# Patient Record
Sex: MALE | Race: WHITE | NOT HISPANIC OR LATINO | Employment: OTHER | URBAN - METROPOLITAN AREA
[De-identification: names, ages, dates, MRNs, and addresses within clinical notes are randomized per-mention and may not be internally consistent; named-entity substitution may affect disease eponyms.]

---

## 2017-03-21 ENCOUNTER — LAB REQUISITION (OUTPATIENT)
Dept: LAB | Facility: HOSPITAL | Age: 72
End: 2017-03-21
Payer: MEDICARE

## 2017-03-21 DIAGNOSIS — R31.29 OTHER MICROSCOPIC HEMATURIA: ICD-10-CM

## 2017-03-21 PROCEDURE — 88112 CYTOPATH CELL ENHANCE TECH: CPT | Performed by: UROLOGY

## 2017-09-18 ENCOUNTER — LAB REQUISITION (OUTPATIENT)
Dept: LAB | Facility: HOSPITAL | Age: 72
End: 2017-09-18
Payer: MEDICARE

## 2017-09-18 DIAGNOSIS — R31.21 ASYMPTOMATIC MICROSCOPIC HEMATURIA: ICD-10-CM

## 2017-09-18 PROCEDURE — 88112 CYTOPATH CELL ENHANCE TECH: CPT | Performed by: UROLOGY

## 2020-06-26 ENCOUNTER — CLINICAL SUPPORT (OUTPATIENT)
Dept: FAMILY MEDICINE CLINIC | Facility: CLINIC | Age: 75
End: 2020-06-26
Payer: MEDICARE

## 2020-06-26 DIAGNOSIS — E53.8 VITAMIN B 12 DEFICIENCY: Primary | ICD-10-CM

## 2020-06-26 RX ORDER — CYANOCOBALAMIN 1000 UG/ML
1000 INJECTION INTRAMUSCULAR; SUBCUTANEOUS
Status: DISCONTINUED | OUTPATIENT
Start: 2020-06-26 | End: 2021-11-10 | Stop reason: HOSPADM

## 2020-06-26 RX ADMIN — CYANOCOBALAMIN 1000 MCG: 1000 INJECTION INTRAMUSCULAR; SUBCUTANEOUS at 16:34

## 2020-07-08 DIAGNOSIS — R41.3 MEMORY IMPAIRMENT: Primary | ICD-10-CM

## 2020-07-08 RX ORDER — DONEPEZIL HYDROCHLORIDE 10 MG/1
10 TABLET, FILM COATED ORAL DAILY
COMMUNITY
End: 2020-07-10 | Stop reason: SDUPTHER

## 2020-07-08 RX ORDER — DONEPEZIL HYDROCHLORIDE 10 MG/1
10 TABLET, FILM COATED ORAL DAILY
Qty: 90 TABLET | Refills: 1 | OUTPATIENT
Start: 2020-07-08

## 2020-07-08 NOTE — TELEPHONE ENCOUNTER
Patient wife called patient has been out of his Donepezil for a few days now she states she spoke to someone regarding the refill when she had him in 2 weeks ago   Please send to [facility] on [address]  90 day supply takes 1 daily

## 2020-07-09 NOTE — TELEPHONE ENCOUNTER
Patient does have neurologist but neurologist was a Dr Nahomi Mae who moved to [place] and the neurologist they are scheduled with is not able to see them until November

## 2020-07-10 RX ORDER — DONEPEZIL HYDROCHLORIDE 10 MG/1
10 TABLET, FILM COATED ORAL DAILY
Qty: 90 TABLET | Refills: 0 | Status: SHIPPED | OUTPATIENT
Start: 2020-07-10 | End: 2020-07-22 | Stop reason: SDUPTHER

## 2020-07-22 ENCOUNTER — OFFICE VISIT (OUTPATIENT)
Dept: FAMILY MEDICINE CLINIC | Facility: CLINIC | Age: 75
End: 2020-07-22
Payer: MEDICARE

## 2020-07-22 VITALS
DIASTOLIC BLOOD PRESSURE: 64 MMHG | BODY MASS INDEX: 22.89 KG/M2 | HEIGHT: 72 IN | TEMPERATURE: 97.8 F | HEART RATE: 55 BPM | WEIGHT: 169 LBS | SYSTOLIC BLOOD PRESSURE: 112 MMHG | OXYGEN SATURATION: 100 % | RESPIRATION RATE: 16 BRPM

## 2020-07-22 DIAGNOSIS — I10 ESSENTIAL HYPERTENSION: Primary | ICD-10-CM

## 2020-07-22 DIAGNOSIS — N40.0 BENIGN PROSTATIC HYPERPLASIA WITHOUT LOWER URINARY TRACT SYMPTOMS: ICD-10-CM

## 2020-07-22 DIAGNOSIS — R73.01 ELEVATED FASTING GLUCOSE: ICD-10-CM

## 2020-07-22 DIAGNOSIS — G30.8 ALZHEIMER'S DISEASE OF OTHER ONSET WITHOUT BEHAVIORAL DISTURBANCE: ICD-10-CM

## 2020-07-22 DIAGNOSIS — F02.80 ALZHEIMER'S DISEASE OF OTHER ONSET WITHOUT BEHAVIORAL DISTURBANCE: ICD-10-CM

## 2020-07-22 DIAGNOSIS — R41.3 MEMORY IMPAIRMENT: ICD-10-CM

## 2020-07-22 DIAGNOSIS — E78.00 HYPERCHOLESTEROLEMIA: ICD-10-CM

## 2020-07-22 PROBLEM — G30.9 ALZHEIMER'S DISEASE (HCC): Status: ACTIVE | Noted: 2020-07-22

## 2020-07-22 PROCEDURE — 99213 OFFICE O/P EST LOW 20 MIN: CPT | Performed by: NURSE PRACTITIONER

## 2020-07-22 PROCEDURE — 1160F RVW MEDS BY RX/DR IN RCRD: CPT | Performed by: NURSE PRACTITIONER

## 2020-07-22 PROCEDURE — 1036F TOBACCO NON-USER: CPT | Performed by: NURSE PRACTITIONER

## 2020-07-22 PROCEDURE — 4040F PNEUMOC VAC/ADMIN/RCVD: CPT | Performed by: NURSE PRACTITIONER

## 2020-07-22 RX ORDER — ASCORBIC ACID 500 MG
500 TABLET ORAL DAILY
Qty: 90 TABLET | Refills: 1 | Status: SHIPPED | OUTPATIENT
Start: 2020-07-22 | End: 2021-03-23 | Stop reason: SDUPTHER

## 2020-07-22 RX ORDER — DONEPEZIL HYDROCHLORIDE 10 MG/1
10 TABLET, FILM COATED ORAL DAILY
Qty: 90 TABLET | Refills: 1 | Status: SHIPPED | OUTPATIENT
Start: 2020-07-22 | End: 2021-03-28

## 2020-07-22 RX ORDER — SODIUM FLUORIDE 6 MG/ML
PASTE, DENTIFRICE DENTAL
COMMUNITY
Start: 2020-07-02 | End: 2021-12-02 | Stop reason: HOSPADM

## 2020-07-22 RX ORDER — ASCORBIC ACID 500 MG
500 TABLET ORAL DAILY
COMMUNITY
End: 2020-07-22 | Stop reason: SDUPTHER

## 2020-07-22 RX ORDER — TAMSULOSIN HYDROCHLORIDE 0.4 MG/1
0.4 CAPSULE ORAL DAILY
Qty: 90 CAPSULE | Refills: 1 | Status: SHIPPED | OUTPATIENT
Start: 2020-07-22 | End: 2021-03-23 | Stop reason: SDUPTHER

## 2020-07-22 RX ORDER — SIMVASTATIN 20 MG
20 TABLET ORAL
Qty: 90 TABLET | Refills: 1 | Status: SHIPPED | OUTPATIENT
Start: 2020-07-22 | End: 2021-03-23 | Stop reason: SDUPTHER

## 2020-07-22 RX ORDER — TAMSULOSIN HYDROCHLORIDE 0.4 MG/1
0.4 CAPSULE ORAL DAILY
COMMUNITY
Start: 2020-04-27 | End: 2020-07-22 | Stop reason: SDUPTHER

## 2020-07-22 RX ORDER — AMLODIPINE, VALSARTAN AND HYDROCHLOROTHIAZIDE 5; 160; 12.5 MG/1; MG/1; MG/1
1 TABLET ORAL EVERY MORNING
Qty: 90 TABLET | Refills: 1 | Status: SHIPPED | OUTPATIENT
Start: 2020-07-22 | End: 2020-07-28 | Stop reason: ALTCHOICE

## 2020-07-22 RX ORDER — ASPIRIN 81 MG/1
81 TABLET, CHEWABLE ORAL DAILY
Qty: 90 TABLET | Refills: 1 | Status: SHIPPED | OUTPATIENT
Start: 2020-07-22 | End: 2021-03-23 | Stop reason: SDUPTHER

## 2020-07-22 RX ORDER — RIBOFLAVIN (VITAMIN B2) 100 MG
100 TABLET ORAL DAILY
COMMUNITY
End: 2020-07-22 | Stop reason: SDUPTHER

## 2020-07-22 RX ADMIN — CYANOCOBALAMIN 1000 MCG: 1000 INJECTION INTRAMUSCULAR; SUBCUTANEOUS at 10:41

## 2020-07-22 NOTE — PROGRESS NOTES
Assessment/Plan:    Presents in office for 4 month follow up   Used to see Dr Edi Oconnell   HTN - stable   Decreased cognitive function / dementia under care of Neurology   BPH under care of Urology without any issues   hyperlipidemia stable  He will need follow up labs   Will review labs  Over the phone            Problem List Items Addressed This Visit        Cardiovascular and Mediastinum    Essential hypertension - Primary    Relevant Medications    amLODIPine-Valsartan-HCTZ 5-160-12 5 MG TABS    Other Relevant Orders    Comprehensive metabolic panel    TSH, 3rd generation with Free T4 reflex    Lipid panel    UA (URINE) with reflex to Scope    HEMOGLOBIN A1C W/ EAG ESTIMATION    CBC and differential       Nervous and Auditory    Alzheimer's disease (HCC)    Relevant Medications    donepezil (ARICEPT) 10 mg tablet    Other Relevant Orders    Comprehensive metabolic panel    TSH, 3rd generation with Free T4 reflex       Genitourinary    Benign prostatic hyperplasia without lower urinary tract symptoms    Relevant Medications    tamsulosin (FLOMAX) 0 4 mg       Other    Memory impairment    Relevant Medications    ascorbic acid (VITAMIN C) 500 MG tablet    aspirin 81 mg chewable tablet    donepezil (ARICEPT) 10 mg tablet    Other Relevant Orders    Comprehensive metabolic panel    TSH, 3rd generation with Free T4 reflex    Hypercholesterolemia    Relevant Medications    simvastatin (ZOCOR) 20 mg tablet    Other Relevant Orders    Comprehensive metabolic panel    Lipid panel    Elevated fasting glucose    Relevant Orders    Comprehensive metabolic panel    HEMOGLOBIN A1C W/ EAG ESTIMATION    CBC and differential            Subjective:      Patient ID: Ishmael Foster is a 67 y o  male      Presents in office for 4 month follow up   Used to see Dr Edi Oconnell   HTN - stable   Decreased cognitive function / dementia under care of Neurology   BPH under care of Urology   hypercholesteremia - stable         The following portions of the patient's history were reviewed and updated as appropriate:   He has a past medical history of Dementia ([address] ) and Hyperlipidemia ,  does not have any pertinent problems on file  ,   has a past surgical history that includes Replacement total knee (Right, 06/19/2019) and Colonoscopy (11/19/2019)  ,  family history includes Lung disease in his father  ,   reports that he has quit smoking  He has a 2 50 pack-year smoking history  He has never used smokeless tobacco  He reports that he drank alcohol  He reports that he has current or past drug history  ,  has No Known Allergies     Current Outpatient Medications   Medication Sig Dispense Refill    ascorbic acid (VITAMIN C) 500 MG tablet Take 1 tablet (500 mg total) by mouth daily 90 tablet 1    aspirin 81 mg chewable tablet Chew 1 tablet (81 mg total) daily 90 tablet 1    donepezil (ARICEPT) 10 mg tablet Take 1 tablet (10 mg total) by mouth daily 90 tablet 1    PREVIDENT 5000 BOOSTER PLUS 1 1 % PSTE Use as directed      simvastatin (ZOCOR) 20 mg tablet Take 1 tablet (20 mg total) by mouth daily at bedtime 90 tablet 1    tamsulosin (FLOMAX) 0 4 mg Take 1 capsule (0 4 mg total) by mouth daily 90 capsule 1    amLODIPine-Valsartan-HCTZ 5-160-12 5 MG TABS Take 1 tablet by mouth every morning 90 tablet 1     Current Facility-Administered Medications   Medication Dose Route Frequency Provider Last Rate Last Dose    cyanocobalamin injection 1,000 mcg  1,000 mcg Intramuscular Q30 Days HORACIO Navarro   1,000 mcg at 07/22/20 1041       Review of Systems   Constitutional: Positive for fatigue  Negative for chills and fever  HENT: Negative for congestion, rhinorrhea, sinus pressure and sinus pain  Eyes: Negative  Respiratory: Negative for cough and shortness of breath  Cardiovascular: Negative for chest pain, palpitations and leg swelling  Gastrointestinal: Negative for abdominal distention and abdominal pain  Endocrine: Negative  Genitourinary: Negative for difficulty urinating and flank pain  Musculoskeletal: Positive for myalgias  Allergic/Immunologic: Positive for environmental allergies  Neurological: Negative for dizziness and headaches  Memory issues /dementia    Psychiatric/Behavioral: Positive for confusion and decreased concentration  Objective:  Vitals:    07/22/20 1002   BP: 112/64   BP Location: Right arm   Patient Position: Sitting   Cuff Size: Standard   Pulse: 55   Resp: 16   Temp: 97 8 °F (36 6 °C)   TempSrc: Temporal   SpO2: 100%   Weight: 76 7 kg (169 lb)   Height: 6' (1 829 m)     Body mass index is 22 92 kg/m²  Physical Exam   Constitutional: He appears well-developed and well-nourished  BMI 22   HENT:   Head: Normocephalic  Eyes: EOM are normal    Neck: Normal range of motion  Cardiovascular: Normal rate and regular rhythm  Pulmonary/Chest: Effort normal and breath sounds normal    Abdominal: Soft  Bowel sounds are normal    Musculoskeletal: Normal range of motion  Neurological: He is alert  Skin: Skin is warm and dry  Nursing note and vitals reviewed

## 2020-07-26 NOTE — PATIENT INSTRUCTIONS

## 2020-07-27 LAB — HBA1C MFR BLD HPLC: 5.6 %

## 2020-07-28 ENCOUNTER — TELEPHONE (OUTPATIENT)
Dept: FAMILY MEDICINE CLINIC | Facility: CLINIC | Age: 75
End: 2020-07-28

## 2020-08-12 ENCOUNTER — TRANSCRIBE ORDERS (OUTPATIENT)
Dept: ADMINISTRATIVE | Facility: HOSPITAL | Age: 75
End: 2020-08-12

## 2020-08-12 DIAGNOSIS — Z96.659 MECHANICAL LOOSENING OF PROSTHETIC KNEE, INITIAL ENCOUNTER (HCC): ICD-10-CM

## 2020-08-12 DIAGNOSIS — Z96.651 PRESENCE OF RIGHT ARTIFICIAL KNEE JOINT: Primary | ICD-10-CM

## 2020-08-12 DIAGNOSIS — T84.038A MECHANICAL LOOSENING OF PROSTHETIC KNEE, INITIAL ENCOUNTER (HCC): ICD-10-CM

## 2020-08-17 ENCOUNTER — HOSPITAL ENCOUNTER (OUTPATIENT)
Dept: RADIOLOGY | Facility: HOSPITAL | Age: 75
Discharge: HOME | End: 2020-08-17
Payer: MEDICARE

## 2020-08-17 DIAGNOSIS — Z96.659 MECHANICAL LOOSENING OF PROSTHETIC KNEE, INITIAL ENCOUNTER (HCC): ICD-10-CM

## 2020-08-17 DIAGNOSIS — T84.038A MECHANICAL LOOSENING OF PROSTHETIC KNEE, INITIAL ENCOUNTER (HCC): ICD-10-CM

## 2020-08-17 DIAGNOSIS — Z96.651 PRESENCE OF RIGHT ARTIFICIAL KNEE JOINT: ICD-10-CM

## 2020-08-17 PROCEDURE — A9503 TC99M MEDRONATE: HCPCS

## 2020-08-17 PROCEDURE — G1004 CDSM NDSC: HCPCS

## 2020-08-17 PROCEDURE — 78315 BONE IMAGING 3 PHASE: CPT

## 2020-08-28 ENCOUNTER — CLINICAL SUPPORT (OUTPATIENT)
Dept: FAMILY MEDICINE CLINIC | Facility: CLINIC | Age: 75
End: 2020-08-28
Payer: MEDICARE

## 2020-08-28 DIAGNOSIS — E53.9 VITAMIN B DEFICIENCY: ICD-10-CM

## 2020-08-28 PROCEDURE — 96372 THER/PROPH/DIAG INJ SC/IM: CPT

## 2020-08-28 RX ADMIN — CYANOCOBALAMIN 1000 MCG: 1000 INJECTION INTRAMUSCULAR; SUBCUTANEOUS at 09:43

## 2020-09-29 ENCOUNTER — CLINICAL SUPPORT (OUTPATIENT)
Dept: FAMILY MEDICINE CLINIC | Facility: CLINIC | Age: 75
End: 2020-09-29

## 2020-09-29 DIAGNOSIS — E53.8 VITAMIN B12 DEFICIENCY: Primary | ICD-10-CM

## 2020-10-30 ENCOUNTER — CLINICAL SUPPORT (OUTPATIENT)
Dept: FAMILY MEDICINE CLINIC | Facility: CLINIC | Age: 75
End: 2020-10-30
Payer: MEDICARE

## 2020-10-30 DIAGNOSIS — E53.8 CYANOCOBALAMIN DEFICIENCY: ICD-10-CM

## 2020-10-30 PROCEDURE — 96372 THER/PROPH/DIAG INJ SC/IM: CPT | Performed by: NURSE PRACTITIONER

## 2020-10-30 RX ADMIN — CYANOCOBALAMIN 1000 MCG: 1000 INJECTION INTRAMUSCULAR; SUBCUTANEOUS at 11:33

## 2020-11-10 ENCOUNTER — TELEPHONE (OUTPATIENT)
Dept: FAMILY MEDICINE CLINIC | Facility: CLINIC | Age: 75
End: 2020-11-10

## 2020-11-10 DIAGNOSIS — E78.2 MIXED HYPERLIPIDEMIA: ICD-10-CM

## 2020-11-10 DIAGNOSIS — Z12.5 SCREENING FOR PROSTATE CANCER: ICD-10-CM

## 2020-11-10 DIAGNOSIS — R73.01 ELEVATED FASTING GLUCOSE: ICD-10-CM

## 2020-11-10 DIAGNOSIS — I10 ESSENTIAL HYPERTENSION: Primary | ICD-10-CM

## 2020-11-16 LAB — HBA1C MFR BLD HPLC: 5.7 %

## 2020-11-20 ENCOUNTER — OFFICE VISIT (OUTPATIENT)
Dept: FAMILY MEDICINE CLINIC | Facility: CLINIC | Age: 75
End: 2020-11-20
Payer: MEDICARE

## 2020-11-20 VITALS
WEIGHT: 176 LBS | RESPIRATION RATE: 16 BRPM | DIASTOLIC BLOOD PRESSURE: 70 MMHG | HEIGHT: 72 IN | TEMPERATURE: 97.2 F | OXYGEN SATURATION: 90 % | BODY MASS INDEX: 23.84 KG/M2 | SYSTOLIC BLOOD PRESSURE: 120 MMHG | HEART RATE: 88 BPM

## 2020-11-20 DIAGNOSIS — E78.2 MIXED HYPERLIPIDEMIA: Primary | ICD-10-CM

## 2020-11-20 DIAGNOSIS — G30.8 ALZHEIMER'S DISEASE OF OTHER ONSET WITHOUT BEHAVIORAL DISTURBANCE: ICD-10-CM

## 2020-11-20 DIAGNOSIS — I10 ESSENTIAL HYPERTENSION: ICD-10-CM

## 2020-11-20 DIAGNOSIS — F02.80 ALZHEIMER'S DISEASE OF OTHER ONSET WITHOUT BEHAVIORAL DISTURBANCE: ICD-10-CM

## 2020-11-20 DIAGNOSIS — R73.01 ELEVATED FASTING GLUCOSE: ICD-10-CM

## 2020-11-20 PROCEDURE — 99214 OFFICE O/P EST MOD 30 MIN: CPT

## 2020-11-20 PROCEDURE — G0439 PPPS, SUBSEQ VISIT: HCPCS

## 2020-11-20 RX ORDER — FERROUS SULFATE 325(65) MG
TABLET ORAL
COMMUNITY
End: 2021-11-10 | Stop reason: HOSPADM

## 2020-11-20 RX ORDER — IPRATROPIUM BROMIDE 21 UG/1
SPRAY, METERED NASAL
COMMUNITY
End: 2021-11-10 | Stop reason: HOSPADM

## 2020-11-20 RX ORDER — INFLUENZA A VIRUS A/MICHIGAN/45/2015 X-275 (H1N1) ANTIGEN (FORMALDEHYDE INACTIVATED), INFLUENZA A VIRUS A/SINGAPORE/INFIMH-16-0019/2016 IVR-186 (H3N2) ANTIGEN (FORMALDEHYDE INACTIVATED), INFLUENZA B VIRUS B/PHUKET/3073/2013 ANTIGEN (FORMALDEHYDE INACTIVATED), AND INFLUENZA B VIRUS B/MARYLAND/15/2016 BX-69A ANTIGEN (FORMALDEHYDE INACTIVATED) 60; 60; 60; 60 UG/.7ML; UG/.7ML; UG/.7ML; UG/.7ML
INJECTION, SUSPENSION INTRAMUSCULAR
COMMUNITY
End: 2021-11-10 | Stop reason: HOSPADM

## 2020-11-20 RX ORDER — AMLODIPINE, VALSARTAN AND HYDROCHLOROTHIAZIDE 5; 160; 12.5 MG/1; MG/1; MG/1
TABLET ORAL
COMMUNITY
End: 2021-03-23 | Stop reason: SDUPTHER

## 2020-11-20 RX ORDER — IBUPROFEN 800 MG/1
TABLET ORAL
COMMUNITY
Start: 2020-08-11 | End: 2021-03-23 | Stop reason: ALTCHOICE

## 2020-11-20 RX ADMIN — CYANOCOBALAMIN 1000 MCG: 1000 INJECTION INTRAMUSCULAR; SUBCUTANEOUS at 14:48

## 2020-12-03 ENCOUNTER — TELEPHONE (OUTPATIENT)
Dept: FAMILY MEDICINE CLINIC | Facility: CLINIC | Age: 75
End: 2020-12-03

## 2020-12-03 DIAGNOSIS — F02.81 ALZHEIMER'S DEMENTIA WITH BEHAVIORAL DISTURBANCE, UNSPECIFIED TIMING OF DEMENTIA ONSET (HCC): Primary | ICD-10-CM

## 2020-12-03 DIAGNOSIS — G30.9 ALZHEIMER'S DEMENTIA WITH BEHAVIORAL DISTURBANCE, UNSPECIFIED TIMING OF DEMENTIA ONSET (HCC): Primary | ICD-10-CM

## 2020-12-09 ENCOUNTER — TELEPHONE (OUTPATIENT)
Dept: NEUROLOGY | Facility: CLINIC | Age: 75
End: 2020-12-09

## 2020-12-14 ENCOUNTER — TELEPHONE (OUTPATIENT)
Dept: FAMILY MEDICINE CLINIC | Facility: CLINIC | Age: 75
End: 2020-12-14

## 2020-12-28 ENCOUNTER — CONSULT (OUTPATIENT)
Dept: NEUROLOGY | Facility: CLINIC | Age: 75
End: 2020-12-28
Payer: MEDICARE

## 2020-12-28 VITALS
SYSTOLIC BLOOD PRESSURE: 130 MMHG | WEIGHT: 170 LBS | TEMPERATURE: 96.6 F | DIASTOLIC BLOOD PRESSURE: 74 MMHG | BODY MASS INDEX: 23.03 KG/M2 | HEART RATE: 64 BPM | HEIGHT: 72 IN

## 2020-12-28 DIAGNOSIS — F03.90 ADVANCED DEMENTIA (HCC): Primary | ICD-10-CM

## 2020-12-28 DIAGNOSIS — F02.81 ALZHEIMER'S DISEASE OF OTHER ONSET WITH BEHAVIORAL DISTURBANCE: ICD-10-CM

## 2020-12-28 DIAGNOSIS — G30.8 ALZHEIMER'S DISEASE OF OTHER ONSET WITH BEHAVIORAL DISTURBANCE: ICD-10-CM

## 2020-12-28 DIAGNOSIS — R41.3 MEMORY LOSS: ICD-10-CM

## 2020-12-28 PROCEDURE — 99205 OFFICE O/P NEW HI 60 MIN: CPT | Performed by: PSYCHIATRY & NEUROLOGY

## 2020-12-28 RX ORDER — MEMANTINE HYDROCHLORIDE 5 MG/1
TABLET ORAL
Qty: 60 TABLET | Refills: 3 | Status: SHIPPED | OUTPATIENT
Start: 2020-12-28 | End: 2021-03-29

## 2020-12-30 ENCOUNTER — CLINICAL SUPPORT (OUTPATIENT)
Dept: FAMILY MEDICINE CLINIC | Facility: CLINIC | Age: 75
End: 2020-12-30
Payer: MEDICARE

## 2020-12-30 VITALS — TEMPERATURE: 96.6 F

## 2020-12-30 DIAGNOSIS — E53.8 B12 DEFICIENCY: ICD-10-CM

## 2020-12-30 DIAGNOSIS — E53.8 VITAMIN B12 DEFICIENCY: Primary | ICD-10-CM

## 2020-12-30 PROCEDURE — 96372 THER/PROPH/DIAG INJ SC/IM: CPT

## 2020-12-30 RX ORDER — CYANOCOBALAMIN 1000 UG/ML
1000 INJECTION INTRAMUSCULAR; SUBCUTANEOUS
Status: DISCONTINUED | OUTPATIENT
Start: 2020-12-30 | End: 2021-11-10 | Stop reason: HOSPADM

## 2020-12-30 RX ADMIN — CYANOCOBALAMIN 1000 MCG: 1000 INJECTION INTRAMUSCULAR; SUBCUTANEOUS at 13:45

## 2021-01-07 ENCOUNTER — TELEPHONE (OUTPATIENT)
Dept: NEUROLOGY | Facility: CLINIC | Age: 76
End: 2021-01-07

## 2021-01-07 NOTE — TELEPHONE ENCOUNTER
Patient calling in  She knows that MRI and EEG were ordered for patient but she states she does not think that patient will be able to have them completed at the current time because she cannot go into the appt with him and she does not think that he could sit /lie still for a long period of time  Wondering if Dr Quan Romo is okay with this?     [phone]   Ok to leave a detailed message

## 2021-01-07 NOTE — TELEPHONE ENCOUNTER
Called and reviewed aretha caputo, she will await for visitor policy to change so that she can be with him

## 2021-01-18 ENCOUNTER — TELEPHONE (OUTPATIENT)
Dept: NEUROLOGY | Facility: CLINIC | Age: 76
End: 2021-01-18

## 2021-01-18 NOTE — TELEPHONE ENCOUNTER
Saul Ulloa calling in  She states patient is taking Memantine 5mg tab, 1 tab daily  Since starting this medication, patient has been more anxious and does not sleep through the night  She notes he is more confused than normal as well  He stays up all night talking out loud  She notices his symptoms can happen throughout the day but are definitely worse at night  Any recommendations?     [phone]   Ok to leave a detailed message

## 2021-01-29 ENCOUNTER — CLINICAL SUPPORT (OUTPATIENT)
Dept: FAMILY MEDICINE CLINIC | Facility: CLINIC | Age: 76
End: 2021-01-29
Payer: MEDICARE

## 2021-01-29 DIAGNOSIS — E53.8 VITAMIN B 12 DEFICIENCY: Primary | ICD-10-CM

## 2021-01-29 PROCEDURE — 96372 THER/PROPH/DIAG INJ SC/IM: CPT

## 2021-01-29 RX ADMIN — CYANOCOBALAMIN 1000 MCG: 1000 INJECTION INTRAMUSCULAR; SUBCUTANEOUS at 13:36

## 2021-02-11 DIAGNOSIS — Z23 ENCOUNTER FOR IMMUNIZATION: ICD-10-CM

## 2021-02-23 ENCOUNTER — IMMUNIZATIONS (OUTPATIENT)
Dept: FAMILY MEDICINE CLINIC | Facility: HOSPITAL | Age: 76
End: 2021-02-23

## 2021-02-23 DIAGNOSIS — Z23 ENCOUNTER FOR IMMUNIZATION: Primary | ICD-10-CM

## 2021-02-23 PROCEDURE — 0001A SARS-COV-2 / COVID-19 MRNA VACCINE (PFIZER-BIONTECH) 30 MCG: CPT

## 2021-02-23 PROCEDURE — 91300 SARS-COV-2 / COVID-19 MRNA VACCINE (PFIZER-BIONTECH) 30 MCG: CPT

## 2021-03-15 LAB — HBA1C MFR BLD HPLC: 5.5 %

## 2021-03-18 ENCOUNTER — IMMUNIZATIONS (OUTPATIENT)
Dept: FAMILY MEDICINE CLINIC | Facility: HOSPITAL | Age: 76
End: 2021-03-18

## 2021-03-18 DIAGNOSIS — Z23 ENCOUNTER FOR IMMUNIZATION: Primary | ICD-10-CM

## 2021-03-18 PROCEDURE — 91300 SARS-COV-2 / COVID-19 MRNA VACCINE (PFIZER-BIONTECH) 30 MCG: CPT

## 2021-03-18 PROCEDURE — 0002A SARS-COV-2 / COVID-19 MRNA VACCINE (PFIZER-BIONTECH) 30 MCG: CPT

## 2021-03-23 ENCOUNTER — OFFICE VISIT (OUTPATIENT)
Dept: FAMILY MEDICINE CLINIC | Facility: CLINIC | Age: 76
End: 2021-03-23
Payer: MEDICARE

## 2021-03-23 VITALS
HEIGHT: 72 IN | DIASTOLIC BLOOD PRESSURE: 68 MMHG | RESPIRATION RATE: 17 BRPM | HEART RATE: 62 BPM | SYSTOLIC BLOOD PRESSURE: 128 MMHG | WEIGHT: 181.6 LBS | BODY MASS INDEX: 24.6 KG/M2 | OXYGEN SATURATION: 98 % | TEMPERATURE: 97.3 F

## 2021-03-23 DIAGNOSIS — G30.8 ALZHEIMER'S DISEASE OF OTHER ONSET WITH BEHAVIORAL DISTURBANCE: ICD-10-CM

## 2021-03-23 DIAGNOSIS — F02.81 ALZHEIMER'S DISEASE OF OTHER ONSET WITH BEHAVIORAL DISTURBANCE: ICD-10-CM

## 2021-03-23 DIAGNOSIS — R41.3 MEMORY IMPAIRMENT: ICD-10-CM

## 2021-03-23 DIAGNOSIS — Z78.9 NEED FOR FOLLOW-UP BY SOCIAL WORKER: Primary | ICD-10-CM

## 2021-03-23 DIAGNOSIS — N40.0 BENIGN PROSTATIC HYPERPLASIA WITHOUT LOWER URINARY TRACT SYMPTOMS: ICD-10-CM

## 2021-03-23 DIAGNOSIS — E78.00 HYPERCHOLESTEROLEMIA: ICD-10-CM

## 2021-03-23 DIAGNOSIS — R73.01 ELEVATED FASTING GLUCOSE: ICD-10-CM

## 2021-03-23 DIAGNOSIS — I10 ESSENTIAL HYPERTENSION: Primary | ICD-10-CM

## 2021-03-23 PROCEDURE — 96372 THER/PROPH/DIAG INJ SC/IM: CPT

## 2021-03-23 PROCEDURE — 99214 OFFICE O/P EST MOD 30 MIN: CPT

## 2021-03-23 RX ORDER — TAMSULOSIN HYDROCHLORIDE 0.4 MG/1
0.4 CAPSULE ORAL DAILY
Qty: 90 CAPSULE | Refills: 1 | Status: SHIPPED | OUTPATIENT
Start: 2021-03-23 | End: 2021-09-27

## 2021-03-23 RX ORDER — AMLODIPINE, VALSARTAN AND HYDROCHLOROTHIAZIDE 5; 160; 12.5 MG/1; MG/1; MG/1
1 TABLET ORAL DAILY
Qty: 90 TABLET | Refills: 1 | Status: SHIPPED | OUTPATIENT
Start: 2021-03-23 | End: 2021-11-10 | Stop reason: HOSPADM

## 2021-03-23 RX ORDER — ASPIRIN 81 MG/1
81 TABLET, CHEWABLE ORAL DAILY
Qty: 90 TABLET | Refills: 1 | Status: SHIPPED | OUTPATIENT
Start: 2021-03-23 | End: 2021-12-11 | Stop reason: SDUPTHER

## 2021-03-23 RX ORDER — ASCORBIC ACID 500 MG
500 TABLET ORAL DAILY
Qty: 90 TABLET | Refills: 1 | Status: SHIPPED | OUTPATIENT
Start: 2021-03-23 | End: 2021-12-11

## 2021-03-23 RX ORDER — SIMVASTATIN 20 MG
20 TABLET ORAL
Qty: 90 TABLET | Refills: 1 | Status: SHIPPED | OUTPATIENT
Start: 2021-03-23 | End: 2021-03-29 | Stop reason: SDUPTHER

## 2021-03-23 RX ADMIN — CYANOCOBALAMIN 1000 MCG: 1000 INJECTION INTRAMUSCULAR; SUBCUTANEOUS at 13:41

## 2021-03-23 NOTE — PROGRESS NOTES
Assessment/Plan:    HTN - stable   Decreased cognitive function / dementia under care of Neurology - would like to see [facility] Neurology- his neurology moved   BPH under care of Urology   hypercholesteremia - stable   Denies any issues today - behavioral issues in the office patient is restless and pacing around   Wife states he  Only likes to be home   Wife needs respite care  So she can take care of her medical issues   I will reach out to  Complex care management for assistance   See if any services available to them      Problem List Items Addressed This Visit        Cardiovascular and Mediastinum    Essential hypertension - Primary    Relevant Medications    amLODIPine-Valsartan-HCTZ 5-160-12 5 MG TABS    Other Relevant Orders    Ambulatory referral to complex care management program    Comprehensive metabolic panel    CBC and differential    TSH, 3rd generation    Lipid panel       Nervous and Auditory    Alzheimer's disease ([facility] [address] )    Relevant Orders    Ambulatory referral to complex care management program    Comprehensive metabolic panel    CBC and differential    TSH, 3rd generation    Lipid panel       Genitourinary    Benign prostatic hyperplasia without lower urinary tract symptoms    Relevant Medications    tamsulosin (FLOMAX) 0 4 mg    Other Relevant Orders    Ambulatory referral to complex care management program    Comprehensive metabolic panel    CBC and differential    TSH, 3rd generation    Lipid panel       Other    Memory impairment    Relevant Medications    aspirin 81 mg chewable tablet    ascorbic acid (VITAMIN C) 500 MG tablet    Other Relevant Orders    Ambulatory referral to complex care management program    Hypercholesterolemia    Relevant Orders    Ambulatory referral to complex care management program    Comprehensive metabolic panel    CBC and differential    TSH, 3rd generation    Lipid panel    Elevated fasting glucose    Relevant Orders    Ambulatory referral to complex care management program    Comprehensive metabolic panel    CBC and differential    TSH, 3rd generation    Lipid panel            Subjective:      Patient ID: Jorden Persaud is a 67 y o  male  Presents in office for 4 month follow up   HTN - stable   Decreased cognitive function / dementia under care of Neurology - would like to see [facility] [place] [facility]  BPH under care of Urology   hypercholesteremia - stable   Denies any issues today           The following portions of the patient's history were reviewed and updated as appropriate:   Past Medical History:  He has a past medical history of Dementia ([address] ), Hyperlipidemia, and Memory loss  ,  _______________________________________________________________________  Medical Problems:  does not have any pertinent problems on file ,  _______________________________________________________________________  Past Surgical History:   has a past surgical history that includes Replacement total knee (Right, 06/19/2019) and Colonoscopy (11/19/2019)  ,  _______________________________________________________________________  Family History:  family history includes Lung disease in his father ,  _______________________________________________________________________  Social History:   reports that he has quit smoking  He has a 2 50 pack-year smoking history  He has never used smokeless tobacco  He reports previous alcohol use  He reports previous drug use ,  _______________________________________________________________________  Allergies:  has No Known Allergies     _______________________________________________________________________  Current Outpatient Medications   Medication Sig Dispense Refill    amLODIPine-Valsartan-HCTZ 5-160-12 5 MG TABS Take 1 tablet by mouth daily 90 tablet 1    ascorbic acid (VITAMIN C) 500 MG tablet Take 1 tablet (500 mg total) by mouth daily 90 tablet 1    aspirin 81 mg chewable tablet Chew 1 tablet (81 mg total) daily 90 tablet 1    ferrous sulfate 325 (65 Fe) mg tablet Take 1 tablet by mouth twice a day with meals      influenza vaccine, high-dose (Fluzone High-Dose Quadrivalent) 0 7 ML ANAI Fluzone High-Dose Quad 2020-21 (PF) 240 mcg/0 7 mL IM syringe   TO BE ADMINISTERED BY PHARMACIST FOR IMMUNIZATION      ipratropium (ATROVENT) 0 03 % nasal spray ipratropium bromide 0 03 % nasal spray   2 SQUIRTS EACH NOSTRIL TWICE DAILY      PREVIDENT 5000 BOOSTER PLUS 1 1 % PSTE Use as directed      tamsulosin (FLOMAX) 0 4 mg Take 1 capsule (0 4 mg total) by mouth daily 90 capsule 1    Aspirin-Calcium Carbonate  MG TABS Take 1 tablet daily      donepezil (ARICEPT) 10 mg tablet TAKE 1 TABLET BY MOUTH EVERY DAY 90 tablet 1    memantine (NAMENDA) 5 mg tablet START WITH 1 TAB DAILY FOR 1 MONTH, AND THEN 1 TABLET TWICE DAILY  180 tablet 1    simvastatin (ZOCOR) 20 mg tablet Take 1 tablet (20 mg total) by mouth daily at bedtime 90 tablet 1     Current Facility-Administered Medications   Medication Dose Route Frequency Provider Last Rate Last Admin    cyanocobalamin injection 1,000 mcg  1,000 mcg Intramuscular Q30 Days Marvetta Cools, CRNP   1,000 mcg at 03/23/21 1341    cyanocobalamin injection 1,000 mcg  1,000 mcg Intramuscular Q30 Days Marvetta Cools, CRNP   1,000 mcg at 12/30/20 1345     _______________________________________________________________________  Review of Systems   Constitutional: Positive for fatigue  Negative for chills and fever  Dementia    HENT: Negative for congestion, rhinorrhea, sinus pressure and sinus pain  Eyes: Negative  Respiratory: Negative for cough and shortness of breath  Cardiovascular: Negative for chest pain, palpitations and leg swelling  Gastrointestinal: Negative for abdominal distention and abdominal pain  Endocrine: Negative  Genitourinary: Negative for difficulty urinating and flank pain  Musculoskeletal: Positive for myalgias  Allergic/Immunologic: Positive for environmental allergies  Neurological: Negative for dizziness and headaches  Memory issues /dementia    Psychiatric/Behavioral: Positive for confusion and decreased concentration  Objective:  Vitals:    03/23/21 1329   BP: 128/68   BP Location: Left arm   Patient Position: Sitting   Cuff Size: Standard   Pulse: 62   Resp: 17   Temp: (!) 97 3 °F (36 3 °C)   TempSrc: Temporal   SpO2: 98%   Weight: 82 4 kg (181 lb 9 6 oz)   Height: 6' (1 829 m)     Body mass index is 24 63 kg/m²  Physical Exam  Vitals signs and nursing note reviewed  Constitutional:       Appearance: He is well-developed  Comments: BMI 23 87    HENT:      Head: Normocephalic  Neck:      Musculoskeletal: Normal range of motion  Pulmonary:      Effort: Pulmonary effort is normal       Comments: Unable to listen to lungs in office restless and pacing around   Upset   Abdominal:      General: Bowel sounds are normal       Palpations: Abdomen is soft  Musculoskeletal: Normal range of motion  Skin:     General: Skin is warm and dry  Neurological:      Mental Status: He is alert  He is disoriented  Gait: Gait abnormal       Comments: Dementia        Contains abnormal data CBC AND DIFFERENTIAL  Order: [id number]  (suggestion)  Information displayed in this report will not trend or trigger automated decision support      Ref Range & Units 3/15/21 10:11 AM   Hemoglobin 12 5 - 17 0 g/dL 11 8Low     Hematocrit 37 0 - 48 0 % 35 9Low     WBC 4 0 - 10 5 thou/cmm 7 2    RBC 4 00 - 5 40 mill/cmm 4 37    Platelet Count 278 - 350 thou/cmm 272    MPV 7 5 - 11 3 fL 8 5    MCV 80 - 100 fL 82    MCH 27 0 - 36 0 pg 26 9Low     MCHC 32 0 - 37 0 g/dL 32 8    RDW 12 0 - 16 0 % 15 5    Differential Type   AUTO    Absolute Neutrophils 1 8 - 7 8 thou/cmm 4 7    Absolute Lymphocytes 1 0 - 3 0 thou/cmm 1 9    Absolute Monocytes 0 3 - 1 0 thou/cmm 0 4    Absolute Eosinophils 0 0 - 0 5 thou/cmm 0 2    Absolute Basophils 0 0 - 0 1 thou/cmm 0 0    Neutrophils  % 65 Lymphocytes  % 26    Monocytes  % 5    Eosinophils  % 3    Basophils  % 1    Specimen Collected: 03/15/21 10:11 AM Last Resulted: 03/15/21  5:21 PM   Received From: [address] [place] [address]  Result Received: 03/18/21  1:17 PM    Received Information     URINALYSIS WITH MICROSCOPIC  Order: [id number]  (suggestion)  Information displayed in this report will not trend or trigger automated decision support  Ref Range & Units 3/15/21 10:11 AM   Specific Gravity, Urine 1 003 - 1 030  1 025    pH, Urine 4 5 - 8 0  6    Protein, Urine Negative mg/dL Negative    Glucose, Urine Negative mg/dL Negative    Ketone, Urine Negative mg/dL Negative    Blood, Urine Negative mg/dL Negative    Leukocytes Esterase Negative /uL Negative    Nitrite, Urine Negative  Negative    Comment   SEE NOTES    Comment: Microscopic analysis not performed on urine with negative contributing biochemical results for Protein, Blood, Leukocyte Esterase, or Nitrates  Specimen Collected: 03/15/21 10:11 AM Last Resulted: 03/15/21  5:34 PM   Received From: [address] [place] [address]  Result Received: 03/18/21  1:17 PM     TSH, 3RD GENERATION  Order: [id number]  (suggestion)  Information displayed in this report will not trend or trigger automated decision support      Ref Range & Units 3/15/21 10:11 AM   Thyroid Stimulating Hormone 0 36 - 3 74 uIU/mL 1 90    Specimen Collected: 03/15/21 10:11 AM Last Resulted: 03/15/21  6:13 PM   Received From: [address] [place] [address]  Result Received: 03/18/21  1:17 PM     HEMOGLOBIN A1C  Order: [id number]  Status:  Final result   Next appt:  04/28/2021 at 02:00 PM in Neurology Curtis Arias MD)   Ref Range & Units 3/15/21 10:11 AM   Hemoglobin A1C <5 7 % 5 5    Comment: Reference Range   Non-diabetic                     <5 7   Pre-diabetic                     5 7-6 4   Diabetic                         >=6 5   ADA target for diabetic control  <=7   eAG, EST AVG Glucose <154 mg/dL 111          LIPID PANEL  Order: [id number]  (suggestion)  Information displayed in this report will not trend or trigger automated decision support  Ref Range & Units 3/15/21 10:11 AM   Cholesterol <200 mg/dL 128    Triglyceride <150 mg/dL 72    Cholesterol, HDL, Direct >40 mg/dL 51    Cholesterol, Non-HDL <160 mg/dL 77    Comment: Note: For NCEP interpretive guidelines please refer to the Laboratory Handbook  Cholesterol, LDL, Calculated <130 mg/dL 63    Comment: LDL Cholesterol was calculated using the Friedewald equation  Direct measurement of LDL is not indicated for this patient based on [facility]'s analytical algorithm for measurement of LDL Cholesterol  CHOL/HDL Ratio   2 51    Comment: Relative Risk   1/2 Average Risk          3 43   Average Risk              4 97   2X Average Risk           9 55   3X Average Risk          23 39   Specimen Collected: 03/15/21 10:11 AM Last Resulted: 03/15/21  6:13 PM   Received From: [address]  Result Received: 03/18/21  1:17 PM     Contains abnormal data COMPREHENSIVE METABOLIC PANEL  Order: [id number]  (suggestion)  Information displayed in this report will not trend or trigger automated decision support  Ref Range & Units 3/15/21 10:11 AM   Glucose 65 - 99 mg/dL 79    BUN 7 - 28 mg/dL 17    Creatinine 0 53 - 1 30 mg/dL 0 70    Sodium 135 - 145 mmol/L 141    Potassium 3 5 - 5 2 mmol/L 4 0    Chloride 100 - 109 mmol/L 111High     Carbon Dioxide 23 - 31 mmol/L 23    Calcium 8 5 - 10 1 mg/dL 9 2    Alkaline Phosphatase 35 - 120 U/L 74    Albumin 3 5 - 4 8 g/dL 3 4Low     Bilirubin, Total 0 2 - 1 0 mg/dL 0 5    Comment: Use of this assay is not recommended for patients undergoing treatment with eltrombopag due to the potential for falsely elevated results     Protein, Total 6 3 - 8 3 g/dL 7 0    AST <41 U/L 14    ALT <56 U/L 16    Anion Gap 3 - 11  7    eGFR, Non- >60  92    eGFR,  >60  107    eGFR Comment   Units: mL/min per 1 73 square meters

## 2021-03-23 NOTE — PATIENT INSTRUCTIONS
Medication Safety for Older Adults   WHAT YOU NEED TO KNOW:   Medication safety includes taking the right medicine, at the right time, and at the right dose  Medicines can affect older adults differently than they affect younger adults or children  Make sure anyone who helps you knows the safety information and specific instructions for all your medicines  DISCHARGE INSTRUCTIONS:   Call your local emergency number (911 in the [address] [place] [address],[address]) or have someone else call if:   · You have trouble breathing or you stop breathing  · You are unconscious or someone cannot get you to respond  · You have a seizure  Return to the emergency department if:   · You feel excitable and have a rapid heartbeat  · You have new or sudden wheezing  · You have mouth or throat swelling  · Your eyes are sunken, or your eyes and mouth are dry  Call your doctor if:   · You missed a dose of your medicine, or you took too much  · You have a rash, or your face or lips look swollen  · You have a headache or are dizzy  · You are confused or irritable  · You are vomiting  · You have diarrhea with blood in it  · You have questions or concerns about your condition or care  Common medicine challenges for older adults:   · Several medicines are taken within the same time period  This can cause any of the following challenges:     ? The medicines may need to be taken at different times of the day  Some may be taken with food, and others without food  ? Several medicines look alike  This can cause confusion if you are not sure which medicine you need to take at a certain time  You may accidentally take the wrong medicine, or forget to take a medicine  ? Certain medicines can increase or decrease the effects of other medicines  Some medicines can cause side effects as they interact  · Certain medicines are less safe for older adults    You may plan to take a medicine you have taken for years, not realizing it is less safe as you get older  An example is NSAIDs for pain and inflammation, such as ibuprofen or naproxen  NSAIDs can increase the risk for stomach bleeding  This problem can be worse for older adults  · Medicine labels can be hard to read  The label may have small print  The information may be confusing or hard to understand  · Care is received from several providers  You may have 1 primary care doctor but also receive care from specialists  Each healthcare provider may prescribe or recommend different medicines  · Medicines are needed for a short time after a surgery or procedure  The new medicines may interact with medicines you take every day  · Medicine side effects are not clear  Side effects can be overlooked because they are similar to effects from a medical condition you have  For example, you may often feel dizzy from high blood pressure and not recognize dizziness from a new medicine  General medicine safety guidelines:   · Take your medicine as directed  Do not split or crush pills unless directed  If you miss a dose, ask how to make up the missed dose  Do not try to make your medicine last longer by skipping doses  Do not take medicines for longer than needed  For example, an opioid prescribed for pain after surgery may not be needed after a few days  · Check the label each time before you take a medicine  Turn on a light to make sure you are taking the right medicine  Ask your healthcare provider or a pharmacist to explain anything on the label you do not understand  Use glasses or a magnifying glass if it is hard to read the label  Your pharmacy may also be able to give you large print labels  · Take the medicine at the right time  Be sure you understand how often you should or can take the medicine  Keep track of when you need the medicine and when you took it  You may want to use a reminder on your phone, or a timer   Make sure every person who helps you can access the tracking and timing information  · Keep medicines organized  ? Store medicines in the bottles they came in  Do not remove the labels  ? Do not dump all your pills into a bag or box  Many pills look alike, and it is important to keep them separate until used  You can move the pills from their bottles into a pill box to organize your daily medicines for the week  ? Do not remove pills from blister packs until you are ready to take them  ? Do not pour liquid medicines into jars or other containers  · Measure liquid medicine correctly  Use a tool specially made to measure liquid medicine  Examples are oral syringes and marked dosing spoons or cups  These tools can be found at a drugstore  Do not  use a kitchen teaspoon or tablespoon  They are not accurate, so you may get too much or too little of the medicine  · Store medicine properly  You may need to store medicine in a cool, dark, dry place  You may need to refrigerate it  Proper storage will prevent the medicine from breaking down or going bad before the expiration date  · Do not drink alcohol or use drugs  Alcohol and drugs can interact with your medicines  Your risk for falls is higher if you mix alcohol or drugs with certain medicines  Alcohol with prescription pain medicines can make you sleepy and slow your breathing rate  You may stop breathing completely  · Do not drive until you know how your medicines affect you  Some medicines may cause you to be drowsy or dizzy  · Never share medicines  Do not take any medicines prescribed for another person  Do not give any of your medicines to another person  What you need to know about prescription pain medicine safety:   · Do not suddenly stop taking prescription pain medicine  If you have been taking prescription pain medicine for longer than 2 weeks, a sudden stop may cause dangerous side effects   Ask your healthcare provider for more information before you stop taking your medicine  · Take your medicine as directed  Take only the amount prescribed or recommended by your healthcare provider  Too much medicine may cause breathing problems or other health issues  If you use a pain patch, be sure to remove the old patch before you place a new one  · Time your medicine correctly  Take your pain medicine 30 minutes before exercise or physical therapy  This helps decrease pain to help meet your treatment goals  You may need to take medicine before you go to bed  This may help you sleep and not be woken by pain  · Watch for side effects  Some foods, alcohol, and other medicines may cause side effects when you take pain medicine  Ask your healthcare provider how to prevent these problems  · Prevent constipation  Constipation is the most common side effect of prescription pain medicine  Eat foods high in fiber, such as raw fruit, vegetables, beans, and whole-grain bread and cereal  Ask your healthcare provider how much liquid to drink each day and which liquids are best for you  Exercise and activity may also help decrease the risk for constipation  · Follow instructions for what to do with medicine you do not use  Your healthcare provider will give you instructions for how to dispose of pain medicine safely  This helps make sure no one else takes the medicine  Make sure others have information about your medicines:   · Keep a list of your medicines  Give the list to anyone who helps care for you, and to all your healthcare providers  Include the medicine amounts, and when and why you take them  Include any vitamins, herbs, or supplements you take  Ask your doctor or pharmacist if a medicine could interact with any other medicine or with food  Do not start or stop any medicine unless your healthcare provider tells you it is okay  Update your list if you start or stop any medicine  · Use 1 pharmacy for all your prescriptions    Your pharmacy will keep a list of your medicines  When you get a new prescription, the pharmacist will check that it will not interact with your other medicines  Keep a copy of the list  Your pharmacist can tell you the side effects for each medicine, and how medicines taken together may affect you  If you take too much medicine or have an allergic reaction:  Call the [facility] immediately at [phone]  For more information:   · [address] [facility] , [facility]  Phone: [phone]  Web Address: [employer]     Follow up with your doctor as directed:  Write down your questions so you remember to ask them during your visits  © Copyright [address] Information is for End User's use only and may not be sold, redistributed or otherwise used for commercial purposes  All illustrations and images included in CareNotes® are the copyrighted property of A D A M [employer] Inc  or [employer]   The above information is an  only  It is not intended as medical advice for individual conditions or treatments  Talk to your doctor, nurse or pharmacist before following any medical regimen to see if it is safe and effective for you  Heart Healthy Diet   WHAT YOU NEED TO KNOW:   A heart healthy diet is an eating plan low in unhealthy fats and sodium (salt)  The plan is high in healthy fats and fiber  A heart healthy diet helps improve your cholesterol levels and lowers your risk for heart disease and stroke  A dietitian will teach you how to read and understand food labels  DISCHARGE INSTRUCTIONS:   Heart healthy diet guidelines to follow:   · Choose foods that contain healthy fats  ? Unsaturated fats  include monounsaturated and polyunsaturated fats  Unsaturated fat is found in foods such as soybean, canola, olive, corn, and safflower oils  It is also found in soft tub margarine that is made with liquid vegetable oil      ? Omega-3 fat  is found in certain fish, such as salmon, tuna, and trout, and in walnuts and flaxseed  Eat fish high in omega-3 fats at least 2 times a week  · Get 20 to 30 grams of fiber each day  Fruits, vegetables, whole-grain foods, and legumes (cooked beans) are good sources of fiber  · Limit or do not have unhealthy fats  ? Cholesterol  is found in animal foods, such as eggs and lobster, and in dairy products made from whole milk  Limit cholesterol to less than 200 mg each day  ? Saturated fat  is found in meats, such as kim and hamburger  It is also found in chicken or turkey skin, whole milk, and butter  Limit saturated fat to less than 7% of your total daily calories  ? Trans fat  is found in packaged foods, such as potato chips and cookies  It is also in hard margarine, some fried foods, and shortening  Do not eat foods that contain trans fats  · Limit sodium as directed  You may be told to limit sodium to 2,000 to 2,300 mg each day  Choose low-sodium or no-salt-added foods  Add little or no salt to food you prepare  Use herbs and spices in place of salt  Include the following in your heart healthy plan:  Ask your dietitian or healthcare provider how many servings to have from each of the following food groups:  · Grains:      ? Whole-wheat breads, cereals, and pastas, and brown rice    ? Low-fat, low-sodium crackers and chips    · Vegetables:      ? Broccoli, green beans, green peas, and spinach    ? Collards, kale, and lima beans    ? Carrots, sweet potatoes, tomatoes, and peppers    ? Canned vegetables with no salt added    · Fruits:      ? Bananas, peaches, pears, and pineapple    ? Grapes, raisins, and dates    ? Oranges, tangerines, grapefruit, orange juice, and grapefruit juice    ? Apricots, mangoes, melons, and papaya    ? Raspberries and strawberries    ? Canned fruit with no added sugar    · Low-fat dairy:      ?  Nonfat (skim) milk, 1% milk, and low-fat almond, cashew, or soy milks fortified with calcium    ? Low-fat cheese, regular or frozen yogurt, and cottage cheese    · Meats and proteins:      ? Lean cuts of beef and pork (loin, leg, round), skinless chicken and turkey    ? Legumes, soy products, egg whites, or nuts    Limit or do not include the following in your heart healthy plan:   · Unhealthy fats and oils:      ? Whole or 2% milk, cream cheese, sour cream, or cheese    ? High-fat cuts of beef (T-bone steaks, ribs), chicken or turkey with skin, and organ meats such as liver    ? Butter, stick margarine, shortening, and cooking oils such as coconut or palm oil    · Foods and liquids high in sodium:      ? Packaged foods, such as frozen dinners, cookies, macaroni and cheese, and cereals with more than 300 mg of sodium per serving    ? Vegetables with added sodium, such as instant potatoes, vegetables with added sauces, or regular canned vegetables    ? Cured or smoked meats, such as hot dogs, kim, and sausage    ? High-sodium ketchup, barbecue sauce, salad dressing, pickles, olives, soy sauce, or miso    · Foods and liquids high in sugar:      ? Candy, cake, cookies, pies, or doughnuts    ? Soft drinks (soda), sports drinks, or sweetened tea    ? Canned or dry mixes for cakes, soups, sauces, or gravies    Other healthy heart guidelines:   · Do not smoke  Nicotine and other chemicals in cigarettes and cigars can cause lung and heart damage  Ask your healthcare provider for information if you currently smoke and need help to quit  E-cigarettes or smokeless tobacco still contain nicotine  Talk to your healthcare provider before you use these products  · Limit or do not drink alcohol as directed  Alcohol can damage your heart and raise your blood pressure  Your healthcare provider may give you specific daily and weekly limits  The general recommended limit is 1 drink a day for women 21 or older and for men 72 or older  Do not have more than 3 drinks in a day or 7 in a week   The recommended limit is 2 drinks a day for men 24to 59years of age  Do not have more than 4 drinks in a day or 14 in a week  A drink of alcohol is 12 ounces of beer, 5 ounces of wine, or 1½ ounces of liquor  · Exercise regularly  Exercise can help you maintain a healthy weight and improve your blood pressure and cholesterol levels  Regular exercise can also decrease your risk for heart problems  Ask your healthcare provider about the best exercise plan for you  Do not start an exercise program without asking your healthcare provider  Follow up with your doctor or cardiologist as directed:  Write down your questions so you remember to ask them during your visits  © Copyright [facility] [address] Information is for End User's use only and may not be sold, redistributed or otherwise used for commercial purposes  All illustrations and images included in CareNotes® are the copyrighted property of [employer]  or [facility] [employer]   The above information is an  only  It is not intended as medical advice for individual conditions or treatments  Talk to your doctor, nurse or pharmacist before following any medical regimen to see if it is safe and effective for you

## 2021-03-23 NOTE — Clinical Note
Put in referral   Anyway we can reach out and discuss any type of respite care for wife please , she is really needs some assistance so she can take care of her won medical issues - pt is starting to have behavioral issues when out of the house - let me know what I can do ? ?

## 2021-03-25 ENCOUNTER — PATIENT OUTREACH (OUTPATIENT)
Dept: CASE MANAGEMENT | Facility: OTHER | Age: 76
End: 2021-03-25

## 2021-03-25 NOTE — PROGRESS NOTES
OP CM SW received referral directly from PCP office regarding patient's wife needing more in home support  KEREN made outreach and spoke to patient's wife harshal  She states that Petty Hector is doing well, but she really feels like she could have more assistance in the home  Currently she does get some support from her daughter and sometimes her daughter in law  Mel Hernandez states she currently does all meal prep, and ADL's for [employer]  She will drive herself to the grocery store early In the morning, while jean carlos is still sleeping to get the food that they need  She is not interested in MOW's at this time  Discussed in home support options, and respite  Mel Hernandez let me know she doesn't really want [employer] to go anywhere because that will make him more confused  Mel Hernandez was not interested in facility respite at this time  KEREN discussed the [facility] [employer] waiver, however she does not feel financially that they will qualify, because they do have money   Another community resource that was discussed with Vanda Townsend was paying privately for caregivers to come into the home to [facility]  States she feels this would be the best option especially because if it was for a couple of hours during the day she could go out and get a little bit of a relief  KEREN will mail Rossana Fountain a list of Private Duty in home caregivers, along with information for share care  Additionally, KEREN will send Rossana Fountain a list of caregiver support groups  Rossana Fountain was extremely appreciative for my outreach and whatever community resources I could provide to her and her   KEREN will continue to follow and be available as needed

## 2021-03-27 DIAGNOSIS — F02.81 ALZHEIMER'S DISEASE OF OTHER ONSET WITH BEHAVIORAL DISTURBANCE: ICD-10-CM

## 2021-03-27 DIAGNOSIS — F03.90 ADVANCED DEMENTIA (HCC): ICD-10-CM

## 2021-03-27 DIAGNOSIS — G30.8 ALZHEIMER'S DISEASE OF OTHER ONSET WITH BEHAVIORAL DISTURBANCE: ICD-10-CM

## 2021-03-28 DIAGNOSIS — R41.3 MEMORY IMPAIRMENT: ICD-10-CM

## 2021-03-28 RX ORDER — DONEPEZIL HYDROCHLORIDE 10 MG/1
TABLET, FILM COATED ORAL
Qty: 90 TABLET | Refills: 1 | Status: SHIPPED | OUTPATIENT
Start: 2021-03-28 | End: 2021-07-07 | Stop reason: SDUPTHER

## 2021-03-29 DIAGNOSIS — E78.00 HYPERCHOLESTEROLEMIA: ICD-10-CM

## 2021-03-29 RX ORDER — SIMVASTATIN 20 MG
20 TABLET ORAL
Qty: 90 TABLET | Refills: 1 | Status: SHIPPED | OUTPATIENT
Start: 2021-03-29 | End: 2021-12-11 | Stop reason: SDUPTHER

## 2021-03-29 RX ORDER — MEMANTINE HYDROCHLORIDE 5 MG/1
TABLET ORAL
Qty: 180 TABLET | Refills: 1 | Status: SHIPPED | OUTPATIENT
Start: 2021-03-29 | End: 2021-11-10 | Stop reason: HOSPADM

## 2021-03-29 NOTE — TELEPHONE ENCOUNTER
Pt's wife left a message for a refill on simvastatin and donepezil - I see donepezil was already filled   Will T up simvastatin

## 2021-04-12 ENCOUNTER — PATIENT OUTREACH (OUTPATIENT)
Dept: CASE MANAGEMENT | Facility: OTHER | Age: 76
End: 2021-04-12

## 2021-04-12 NOTE — PROGRESS NOTES
Op CM SW reached out to patients wife harshal to follow up and see how things were going  Phone picked up but then got disconnected, tried calling back, with no response  SW will attempt additional outreach to follow up

## 2021-04-23 ENCOUNTER — TELEPHONE (OUTPATIENT)
Dept: NEUROLOGY | Facility: CLINIC | Age: 76
End: 2021-04-23

## 2021-04-23 NOTE — TELEPHONE ENCOUNTER
THE [facility] for patient to [facility] and confirm appointment with Dr Margaret Pearson  Gave direct numbers in [place]

## 2021-04-28 ENCOUNTER — OFFICE VISIT (OUTPATIENT)
Dept: NEUROLOGY | Facility: CLINIC | Age: 76
End: 2021-04-28
Payer: MEDICARE

## 2021-04-28 VITALS
BODY MASS INDEX: 23.7 KG/M2 | HEART RATE: 68 BPM | WEIGHT: 175 LBS | HEIGHT: 72 IN | DIASTOLIC BLOOD PRESSURE: 60 MMHG | SYSTOLIC BLOOD PRESSURE: 102 MMHG

## 2021-04-28 DIAGNOSIS — G30.0 EARLY ONSET ALZHEIMER'S DEMENTIA WITHOUT BEHAVIORAL DISTURBANCE (HCC): Primary | ICD-10-CM

## 2021-04-28 DIAGNOSIS — F02.80 EARLY ONSET ALZHEIMER'S DEMENTIA WITHOUT BEHAVIORAL DISTURBANCE (HCC): Primary | ICD-10-CM

## 2021-04-28 PROCEDURE — 99214 OFFICE O/P EST MOD 30 MIN: CPT | Performed by: PSYCHIATRY & NEUROLOGY

## 2021-04-28 RX ORDER — QUETIAPINE FUMARATE 25 MG/1
25 TABLET, FILM COATED ORAL
Qty: 30 TABLET | Refills: 2 | Status: SHIPPED | OUTPATIENT
Start: 2021-04-28 | End: 2021-07-06

## 2021-04-28 RX ORDER — CITALOPRAM 20 MG/1
20 TABLET ORAL DAILY
Qty: 30 TABLET | Refills: 4 | Status: SHIPPED | OUTPATIENT
Start: 2021-04-28 | End: 2021-11-10 | Stop reason: HOSPADM

## 2021-04-28 RX ORDER — CITALOPRAM 10 MG/1
10 TABLET ORAL DAILY
Qty: 30 TABLET | Refills: 0 | Status: SHIPPED | OUTPATIENT
Start: 2021-04-28 | End: 2021-05-23

## 2021-04-28 NOTE — PROGRESS NOTES
Return NeuroOutpatient Note        Jordan Jacques  [id number]  67 y o   1945       Dementia        History obtained from:  Patient and wife     HPI/Subjective:    Jordan Jacques is a 68 yo M with PMH of dementia returns as f/u  Wife has noticed this for past 2 years  His main problem is short term memory loss  At initial visit we had ordered MRI brain , EEG  Patient refused and wouldn't allow either  Now he tends to pace a lot in evening hours  He has been having visual hallucinations as well  He is noted to be talking to imaginary people  Thus far, it's harmless  He occasionally gets angry but then apologizes  He is independent with taking shower, feeding  He doesn't know his home address  He doesn't know his children  He recognizes his wife but can't recall her name  He does have sane moments as well  Physically he gets around well  He has tendency to constantly fidget his his left hand fingers  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  He used to work at chemical plant for 40 years  He retired 12 years ago         Past Medical History:   Diagnosis Date    Dementia ([facility] [address] )     Hyperlipidemia     Memory loss      Social History     Socioeconomic History    Marital status: /Civil Union     Spouse name: Not on file    Number of children: Not on file    Years of education: Not on file    Highest education level: Not on file   Occupational History    Not on file   Social Needs    Financial resource strain: Not hard at all   Corine-Jacob insecurity     Worry: Never true     Inability: Never true   [employer] needs     Medical: No     Non-medical: No   Tobacco Use    Smoking status: Former Smoker     Packs/day: 0 25     Years: 10 00     Pack years: 2 50    Smokeless tobacco: Never Used    Tobacco comment: 1PPW   Substance and Sexual Activity    Alcohol use: Not Currently    Drug use: Not Currently    Sexual activity: Not Currently   Lifestyle    Physical activity     Days per week: 0 days     Minutes per session: 0 min    Stress: Not at all   Relationships    Social connections     Talks on phone: Never     Gets together: Never     Attends [employer] service: Never     Active member of club or organization: No     Attends meetings of clubs or organizations: Never     Relationship status: Not on file    Intimate partner violence     Fear of current or ex partner: No     Emotionally abused: No     Physically abused: No     Forced sexual activity: No   Other Topics Concern    Not on file   Social History Narrative    History of Holter Monitor: 2019    History of ECHO: 2019    · Most recent tobacco use screenin2019      · Do you currently or have you served in the [employer]:   No      Family History   Problem Relation Age of Onset    Lung disease Father         coal workers' pneumoconiosis     No Known Allergies  Current Outpatient Medications on File Prior to Visit   Medication Sig Dispense Refill    ascorbic acid (VITAMIN C) 500 MG tablet Take 1 tablet (500 mg total) by mouth daily 90 tablet 1    aspirin 81 mg chewable tablet Chew 1 tablet (81 mg total) daily 90 tablet 1    donepezil (ARICEPT) 10 mg tablet TAKE 1 TABLET BY MOUTH EVERY DAY 90 tablet 1    memantine (NAMENDA) 5 mg tablet START WITH 1 TAB DAILY FOR 1 MONTH, AND THEN 1 TABLET TWICE DAILY   180 tablet 1    PREVIDENT 5000 BOOSTER PLUS 1 1 % PSTE Use as directed      simvastatin (ZOCOR) 20 mg tablet Take 1 tablet (20 mg total) by mouth daily at bedtime 90 tablet 1    tamsulosin (FLOMAX) 0 4 mg Take 1 capsule (0 4 mg total) by mouth daily 90 capsule 1    amLODIPine-Valsartan-HCTZ 5-160-12 5 MG TABS Take 1 tablet by mouth daily (Patient not taking: Reported on 2021) 90 tablet 1    Aspirin-Calcium Carbonate  MG TABS Take 1 tablet daily      ferrous sulfate 325 (65 Fe) mg tablet Take 1 tablet by mouth twice a day with meals      influenza vaccine, high-dose (Fluzone High-Dose Quadrivalent) 0 7 ML ANAI Fluzone High-Dose Quad 2020-21 (PF) 240 mcg/0 7 mL IM syringe   TO BE ADMINISTERED BY PHARMACIST FOR IMMUNIZATION      ipratropium (ATROVENT) 0 03 % nasal spray ipratropium bromide 0 03 % nasal spray   2 SQUIRTS EACH NOSTRIL TWICE DAILY       Current Facility-Administered Medications on File Prior to Visit   Medication Dose Route Frequency Provider Last Rate Last Admin    cyanocobalamin injection 1,000 mcg  1,000 mcg Intramuscular Q30 Days HORACIO Patel   1,000 mcg at 03/23/21 1341    cyanocobalamin injection 1,000 mcg  1,000 mcg Intramuscular Q30 Days HORACIO Patel   1,000 mcg at 12/30/20 1345         Review of Systems   Refer to positive review of systems in HPI  Review of Systems    Constitutional- No fever  Eyes- No visual change  ENT- Hearing normal  CV- No chest pain  Resp- No Shortness of breath  GI- No diarrhea  - Bladder normal  MS- No Arthritis   Skin- No rash  Psych- No depression  Endo- No DM  Heme- No nodes    Vitals:    04/28/21 1403   BP: 102/60   BP Location: Left arm   Patient Position: Sitting   Cuff Size: Large   Pulse: 68   Weight: 79 4 kg (175 lb)   Height: 6' (1 829 m)       PHYSICAL EXAM:  Appearance: No Acute Distress  Ophthalmoscopic: Disc Flat, Normal fundus  Mental status:  Orientation: Awake, Alert, and Oriented to self only  Memory: jessica  Patient doesn't cooperate     Attention: normal  Knowledge: good  Language: No aphasia  Speech: No dysarthria  Cranial Nerves:  2 No Visual Defect on Confrontation, Pupils round, equal, reactive to light  3,4,6 Extraocular Movements Intact, no nystagmus  5 Facial Sensation Intact  7 No facial asymmetry  8 Intact hearing  9,10 Palate symmetric, normal gag  11 Good shoulder shrug  12 Tongue Midline  Gait: Stable  Coordination: No ataxia with finger to nose testing, and heel to shin  Sensory: Intact, Symmetric to pinprick, light touch, vibration, and joint position  Muscle Tone: Normal              Muscle exam:  Arm Right Left Leg Right Left   Deltoid 5/5 5/5 Iliopsoas 5/5 5/5   Biceps 5/5 5/5 Quads 5/5 5/5   Triceps 5/5 5/5 Hamstrings 5/5 5/5   Wrist Extension 5/5 5/5 Ankle Dorsi Flexion 5/5 5/5   Wrist Flexion 5/5 5/5 Ankle Plantar Flexion 5/5 5/5   Interossei 5/5 5/5 Ankle Eversion 5/5 5/5   APB 5/5 5/5 Ankle Inversion 5/5 5/5       Reflexes   RJ BJ TJ KJ AJ Plantars Leiva's   Right 2+ 2+ 2+ 2+ 2+ Downgoing Not present   Left 2+ 2+ 2+ 2+ 2+ Downgoing Not present     Personal review of  Labs:                  Diagnoses and all orders for this visit:        1  Early onset Alzheimer's dementia without behavioral disturbance (HCC)  QUEtiapine (SEROquel) 25 mg tablet    citalopram (CeleXA) 10 mg tablet    citalopram (CeleXA) 20 mg tablet       Patient is progressively getting worse  Wife is questioning benefit of memory enhancers at this point and agree that they are not likely to benefit much  She may d/c one at a time  Will start him on SSRI celexa as it can help him with his mood, behavior and there's some data that it can help retain memory as well  Will place him on low dose seroquel to see if it helps him wit hallucinations and pacing  Wife is asked to call us if any medication adjustments need to be made                 Total time of encounter:  30 min  More than 50% of the time was used in counseling and/or coordination of care  Extent of counseling and/or coordination of care        Taz Charlton MD  [address] [facility]  [address]  [address]  [phone]

## 2021-04-30 ENCOUNTER — CLINICAL SUPPORT (OUTPATIENT)
Dept: FAMILY MEDICINE CLINIC | Facility: CLINIC | Age: 76
End: 2021-04-30
Payer: MEDICARE

## 2021-04-30 DIAGNOSIS — E53.8 VITAMIN B 12 DEFICIENCY: Primary | ICD-10-CM

## 2021-04-30 PROCEDURE — 96372 THER/PROPH/DIAG INJ SC/IM: CPT

## 2021-04-30 RX ADMIN — CYANOCOBALAMIN 1000 MCG: 1000 INJECTION INTRAMUSCULAR; SUBCUTANEOUS at 13:51

## 2021-05-05 ENCOUNTER — TELEPHONE (OUTPATIENT)
Dept: FAMILY MEDICINE CLINIC | Facility: CLINIC | Age: 76
End: 2021-05-05

## 2021-05-05 NOTE — TELEPHONE ENCOUNTER
Wife would like a call her   got new medication from neurologist and she doesn't like the side effects   ------she would like to discuss it with you

## 2021-05-12 ENCOUNTER — PATIENT OUTREACH (OUTPATIENT)
Dept: CASE MANAGEMENT | Facility: OTHER | Age: 76
End: 2021-05-12

## 2021-05-12 NOTE — PROGRESS NOTES
OP MAVIS VELIZ attempted follow up regarding resources that were sent to patient  Phone picked up and then got disconnected  RELL VELIZ will be closing patient at this time  No other needs present, and patient's wife has all of the home resources she might need  José Miguel Taylor has my contact information for future needs as well  KEREN Yo will be closing patient from outpatient care management

## 2021-05-23 DIAGNOSIS — F02.80 EARLY ONSET ALZHEIMER'S DEMENTIA WITHOUT BEHAVIORAL DISTURBANCE (HCC): ICD-10-CM

## 2021-05-23 DIAGNOSIS — G30.0 EARLY ONSET ALZHEIMER'S DEMENTIA WITHOUT BEHAVIORAL DISTURBANCE (HCC): ICD-10-CM

## 2021-05-23 RX ORDER — CITALOPRAM 10 MG/1
TABLET ORAL
Qty: 30 TABLET | Refills: 0 | Status: SHIPPED | OUTPATIENT
Start: 2021-05-23 | End: 2021-11-10 | Stop reason: HOSPADM

## 2021-05-28 ENCOUNTER — CLINICAL SUPPORT (OUTPATIENT)
Dept: FAMILY MEDICINE CLINIC | Facility: CLINIC | Age: 76
End: 2021-05-28
Payer: MEDICARE

## 2021-05-28 DIAGNOSIS — E53.8 VITAMIN B 12 DEFICIENCY: Primary | ICD-10-CM

## 2021-05-28 PROCEDURE — 96372 THER/PROPH/DIAG INJ SC/IM: CPT

## 2021-05-28 RX ADMIN — CYANOCOBALAMIN 1000 MCG: 1000 INJECTION INTRAMUSCULAR; SUBCUTANEOUS at 13:58

## 2021-06-29 ENCOUNTER — CLINICAL SUPPORT (OUTPATIENT)
Dept: FAMILY MEDICINE CLINIC | Facility: CLINIC | Age: 76
End: 2021-06-29
Payer: MEDICARE

## 2021-06-29 DIAGNOSIS — E53.8 VITAMIN B12 DEFICIENCY: Primary | ICD-10-CM

## 2021-06-29 RX ADMIN — CYANOCOBALAMIN 1000 MCG: 1000 INJECTION INTRAMUSCULAR; SUBCUTANEOUS at 13:32

## 2021-07-06 DIAGNOSIS — F02.80 EARLY ONSET ALZHEIMER'S DEMENTIA WITHOUT BEHAVIORAL DISTURBANCE (HCC): ICD-10-CM

## 2021-07-06 DIAGNOSIS — G30.0 EARLY ONSET ALZHEIMER'S DEMENTIA WITHOUT BEHAVIORAL DISTURBANCE (HCC): ICD-10-CM

## 2021-07-06 RX ORDER — QUETIAPINE FUMARATE 25 MG/1
TABLET, FILM COATED ORAL
Qty: 90 TABLET | Refills: 0 | Status: SHIPPED | OUTPATIENT
Start: 2021-07-06 | End: 2021-11-10 | Stop reason: HOSPADM

## 2021-07-07 DIAGNOSIS — R41.3 MEMORY IMPAIRMENT: ICD-10-CM

## 2021-07-07 RX ORDER — DONEPEZIL HYDROCHLORIDE 10 MG/1
10 TABLET, FILM COATED ORAL DAILY
Qty: 90 TABLET | Refills: 1 | Status: SHIPPED | OUTPATIENT
Start: 2021-07-07 | End: 2021-12-11 | Stop reason: SDUPTHER

## 2021-07-30 ENCOUNTER — CLINICAL SUPPORT (OUTPATIENT)
Dept: FAMILY MEDICINE CLINIC | Facility: CLINIC | Age: 76
End: 2021-07-30
Payer: MEDICARE

## 2021-07-30 DIAGNOSIS — E53.8 VITAMIN B 12 DEFICIENCY: Primary | ICD-10-CM

## 2021-07-30 PROCEDURE — 96372 THER/PROPH/DIAG INJ SC/IM: CPT

## 2021-07-30 RX ADMIN — CYANOCOBALAMIN 30000 MCG: 1000 INJECTION INTRAMUSCULAR; SUBCUTANEOUS at 13:31

## 2021-09-03 ENCOUNTER — CLINICAL SUPPORT (OUTPATIENT)
Dept: FAMILY MEDICINE CLINIC | Facility: CLINIC | Age: 76
End: 2021-09-03
Payer: MEDICARE

## 2021-09-03 DIAGNOSIS — E53.8 VITAMIN B12 DEFICIENCY: Primary | ICD-10-CM

## 2021-09-03 PROCEDURE — 96372 THER/PROPH/DIAG INJ SC/IM: CPT

## 2021-09-03 RX ADMIN — CYANOCOBALAMIN 1000 MCG: 1000 INJECTION INTRAMUSCULAR; SUBCUTANEOUS at 14:31

## 2021-09-26 DIAGNOSIS — N40.0 BENIGN PROSTATIC HYPERPLASIA WITHOUT LOWER URINARY TRACT SYMPTOMS: ICD-10-CM

## 2021-09-27 RX ORDER — TAMSULOSIN HYDROCHLORIDE 0.4 MG/1
CAPSULE ORAL
Qty: 90 CAPSULE | Refills: 1 | Status: SHIPPED | OUTPATIENT
Start: 2021-09-27 | End: 2021-12-11 | Stop reason: SDUPTHER

## 2021-10-04 ENCOUNTER — CLINICAL SUPPORT (OUTPATIENT)
Dept: FAMILY MEDICINE CLINIC | Facility: CLINIC | Age: 76
End: 2021-10-04
Payer: MEDICARE

## 2021-10-04 DIAGNOSIS — E53.8 VITAMIN B12 DEFICIENCY: Primary | ICD-10-CM

## 2021-10-04 RX ADMIN — CYANOCOBALAMIN 1000 MCG: 1000 INJECTION INTRAMUSCULAR; SUBCUTANEOUS at 13:35

## 2021-10-12 ENCOUNTER — IMMUNIZATIONS (OUTPATIENT)
Dept: FAMILY MEDICINE CLINIC | Facility: HOSPITAL | Age: 76
End: 2021-10-12

## 2021-10-12 DIAGNOSIS — Z23 ENCOUNTER FOR IMMUNIZATION: Primary | ICD-10-CM

## 2021-10-12 PROCEDURE — 91300 SARS-COV-2 / COVID-19 MRNA VACCINE (PFIZER-BIONTECH) 30 MCG: CPT

## 2021-10-12 PROCEDURE — 0001A SARS-COV-2 / COVID-19 MRNA VACCINE (PFIZER-BIONTECH) 30 MCG: CPT

## 2021-10-27 ENCOUNTER — CLINICAL SUPPORT (OUTPATIENT)
Dept: FAMILY MEDICINE CLINIC | Facility: CLINIC | Age: 76
End: 2021-10-27

## 2021-10-27 ENCOUNTER — TELEPHONE (OUTPATIENT)
Dept: FAMILY MEDICINE CLINIC | Facility: CLINIC | Age: 76
End: 2021-10-27

## 2021-10-27 DIAGNOSIS — N40.0 BENIGN PROSTATIC HYPERPLASIA WITHOUT LOWER URINARY TRACT SYMPTOMS: Primary | ICD-10-CM

## 2021-10-27 DIAGNOSIS — R35.0 URINARY FREQUENCY: Primary | ICD-10-CM

## 2021-10-27 LAB
BILIRUB UR QL STRIP: NEGATIVE
CLARITY UR: CLEAR
COLOR UR: YELLOW
GLUCOSE UR STRIP-MCNC: NEGATIVE MG/DL
HGB UR QL STRIP.AUTO: NEGATIVE
KETONES UR STRIP-MCNC: NEGATIVE MG/DL
LEUKOCYTE ESTERASE UR QL STRIP: NEGATIVE
NITRITE UR QL STRIP: NEGATIVE
PH UR STRIP.AUTO: 6.5 [PH]
PROT UR STRIP-MCNC: NEGATIVE MG/DL
SP GR UR STRIP.AUTO: 1.01 (ref 1–1.03)
UROBILINOGEN UR QL STRIP.AUTO: 0.2 E.U./DL

## 2021-10-27 PROCEDURE — 81003 URINALYSIS AUTO W/O SCOPE: CPT | Performed by: NURSE PRACTITIONER

## 2021-11-04 ENCOUNTER — HOSPITAL ENCOUNTER (INPATIENT)
Facility: HOSPITAL | Age: 76
LOS: 5 days | Discharge: NURSING, REHAB OR LONG-TERM CARE | DRG: 481 | End: 2021-11-10
Attending: EMERGENCY MEDICINE | Admitting: SURGERY
Payer: MEDICARE

## 2021-11-04 ENCOUNTER — APPOINTMENT (EMERGENCY)
Dept: RADIOLOGY | Facility: HOSPITAL | Age: 76
DRG: 481 | End: 2021-11-04
Payer: MEDICARE

## 2021-11-04 DIAGNOSIS — F02.80 ALZHEIMER'S DISEASE (HCC): ICD-10-CM

## 2021-11-04 DIAGNOSIS — S72.141A INTERTROCHANTERIC FRACTURE OF RIGHT FEMUR (HCC): Primary | ICD-10-CM
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
## 2021-11-04 DIAGNOSIS — F03.90 DEMENTIA (HCC): ICD-10-CM

## 2021-11-04 DIAGNOSIS — G30.9 ALZHEIMER'S DISEASE (HCC): ICD-10-CM

## 2021-11-04 DIAGNOSIS — S09.90XA CLOSED HEAD INJURY, INITIAL ENCOUNTER: ICD-10-CM

## 2021-11-04 PROCEDURE — 85730 THROMBOPLASTIN TIME PARTIAL: CPT

## 2021-11-04 PROCEDURE — 85610 PROTHROMBIN TIME: CPT

## 2021-11-04 PROCEDURE — 36415 COLL VENOUS BLD VENIPUNCTURE: CPT

## 2021-11-04 PROCEDURE — 99285 EMERGENCY DEPT VISIT HI MDM: CPT | Performed by: EMERGENCY MEDICINE

## 2021-11-04 PROCEDURE — 84484 ASSAY OF TROPONIN QUANT: CPT

## 2021-11-04 PROCEDURE — 73502 X-RAY EXAM HIP UNI 2-3 VIEWS: CPT

## 2021-11-04 PROCEDURE — 80053 COMPREHEN METABOLIC PANEL: CPT

## 2021-11-04 PROCEDURE — 71045 X-RAY EXAM CHEST 1 VIEW: CPT

## 2021-11-04 PROCEDURE — 85025 COMPLETE CBC W/AUTO DIFF WBC: CPT

## 2021-11-04 PROCEDURE — 99285 EMERGENCY DEPT VISIT HI MDM: CPT

## 2021-11-05 ENCOUNTER — ANESTHESIA (INPATIENT)
Dept: PERIOP | Facility: HOSPITAL | Age: 76
DRG: 481 | End: 2021-11-05
Payer: MEDICARE

## 2021-11-05 ENCOUNTER — APPOINTMENT (INPATIENT)
Dept: RADIOLOGY | Facility: HOSPITAL | Age: 76
DRG: 481 | End: 2021-11-05
Payer: MEDICARE

## 2021-11-05 ENCOUNTER — ANESTHESIA EVENT (INPATIENT)
Dept: PERIOP | Facility: HOSPITAL | Age: 76
DRG: 481 | End: 2021-11-05
Payer: MEDICARE

## 2021-11-05 ENCOUNTER — APPOINTMENT (EMERGENCY)
Dept: CT IMAGING | Facility: HOSPITAL | Age: 76
DRG: 481 | End: 2021-11-05
Payer: MEDICARE
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
## 2021-11-05 PROBLEM — S72.001A CLOSED RIGHT HIP FRACTURE (HCC): Status: ACTIVE | Noted: 2021-11-05

## 2021-11-05 PROBLEM — Z96.651 S/P TOTAL KNEE REPLACEMENT, RIGHT: Status: ACTIVE | Noted: 2021-11-05

## 2021-11-05 PROBLEM — D64.9 ANEMIA: Status: ACTIVE | Noted: 2021-11-05

## 2021-11-05 PROBLEM — R13.10 DYSPHAGIA: Status: ACTIVE | Noted: 2021-11-05

## 2021-11-05 LAB
ABO GROUP BLD: NORMAL
ABO GROUP BLD: NORMAL
ALBUMIN SERPL BCP-MCNC: 3.9 G/DL (ref 3.5–5)
ALP SERPL-CCNC: 67 U/L (ref 46–116)
ALT SERPL W P-5'-P-CCNC: 36 U/L (ref 12–78)
ANION GAP SERPL CALCULATED.3IONS-SCNC: 11 MMOL/L (ref 4–13)
ANION GAP SERPL CALCULATED.3IONS-SCNC: 15 MMOL/L (ref 4–13)
APTT PPP: 30 SECONDS (ref 23–37)
AST SERPL W P-5'-P-CCNC: 27 U/L (ref 5–45)
ATRIAL RATE: 94 BPM
BASOPHILS # BLD AUTO: 0.05 THOUSANDS/ΜL (ref 0–0.1)
BASOPHILS NFR BLD AUTO: 1 % (ref 0–1)
BILIRUB SERPL-MCNC: 0.4 MG/DL (ref 0.2–1)
BILIRUB UR QL STRIP: NEGATIVE
BLD GP AB SCN SERPL QL: NEGATIVE
BUN SERPL-MCNC: 25 MG/DL (ref 5–25)
BUN SERPL-MCNC: 29 MG/DL (ref 5–25)
CALCIUM SERPL-MCNC: 8.8 MG/DL (ref 8.3–10.1)
CALCIUM SERPL-MCNC: 9.1 MG/DL (ref 8.3–10.1)
CHLORIDE SERPL-SCNC: 106 MMOL/L (ref 100–108)
CHLORIDE SERPL-SCNC: 109 MMOL/L (ref 100–108)
CLARITY UR: CLEAR
CO2 SERPL-SCNC: 21 MMOL/L (ref 21–32)
CO2 SERPL-SCNC: 22 MMOL/L (ref 21–32)
COLOR UR: YELLOW
CREAT SERPL-MCNC: 0.81 MG/DL (ref 0.6–1.3)
CREAT SERPL-MCNC: 0.9 MG/DL (ref 0.6–1.3)
EOSINOPHIL # BLD AUTO: 0.15 THOUSAND/ΜL (ref 0–0.61)
EOSINOPHIL NFR BLD AUTO: 2 % (ref 0–6)
ERYTHROCYTE [DISTWIDTH] IN BLOOD BY AUTOMATED COUNT: 13.4 % (ref 11.6–15.1)
ERYTHROCYTE [DISTWIDTH] IN BLOOD BY AUTOMATED COUNT: 13.4 % (ref 11.6–15.1)
GFR SERPL CREATININE-BSD FRML MDRD: 82 ML/MIN/1.73SQ M
GFR SERPL CREATININE-BSD FRML MDRD: 86 ML/MIN/1.73SQ M
GLUCOSE SERPL-MCNC: 108 MG/DL (ref 65–140)
GLUCOSE SERPL-MCNC: 118 MG/DL (ref 65–140)
GLUCOSE UR STRIP-MCNC: NEGATIVE MG/DL
HCT VFR BLD AUTO: 35.1 % (ref 36.5–49.3)
HCT VFR BLD AUTO: 37.4 % (ref 36.5–49.3)
HGB BLD-MCNC: 11 G/DL (ref 12–17)
HGB BLD-MCNC: 12.1 G/DL (ref 12–17)
HGB UR QL STRIP.AUTO: NEGATIVE
IMM GRANULOCYTES # BLD AUTO: 0.06 THOUSAND/UL (ref 0–0.2)
IMM GRANULOCYTES NFR BLD AUTO: 1 % (ref 0–2)
INR PPP: 0.93 (ref 0.84–1.19)
KETONES UR STRIP-MCNC: ABNORMAL MG/DL
LEUKOCYTE ESTERASE UR QL STRIP: NEGATIVE
LYMPHOCYTES # BLD AUTO: 1.7 THOUSANDS/ΜL (ref 0.6–4.47)
LYMPHOCYTES NFR BLD AUTO: 20 % (ref 14–44)
MCH RBC QN AUTO: 27.7 PG (ref 26.8–34.3)
MCH RBC QN AUTO: 28.4 PG (ref 26.8–34.3)
MCHC RBC AUTO-ENTMCNC: 31.3 G/DL (ref 31.4–37.4)
MCHC RBC AUTO-ENTMCNC: 32.4 G/DL (ref 31.4–37.4)
MCV RBC AUTO: 88 FL (ref 82–98)
MCV RBC AUTO: 88 FL (ref 82–98)
MONOCYTES # BLD AUTO: 0.49 THOUSAND/ΜL (ref 0.17–1.22)
MONOCYTES NFR BLD AUTO: 6 % (ref 4–12)
NEUTROPHILS # BLD AUTO: 6.09 THOUSANDS/ΜL (ref 1.85–7.62)
NEUTS SEG NFR BLD AUTO: 70 % (ref 43–75)
NITRITE UR QL STRIP: NEGATIVE
NRBC BLD AUTO-RTO: 0 /100 WBCS
P AXIS: 73 DEGREES
PH UR STRIP.AUTO: 8.5 [PH]
PLATELET # BLD AUTO: 241 THOUSANDS/UL (ref 149–390)
PLATELET # BLD AUTO: 265 THOUSANDS/UL (ref 149–390)
PMV BLD AUTO: 10.1 FL (ref 8.9–12.7)
PMV BLD AUTO: 10.2 FL (ref 8.9–12.7)
POTASSIUM SERPL-SCNC: 3.5 MMOL/L (ref 3.5–5.3)
POTASSIUM SERPL-SCNC: 3.5 MMOL/L (ref 3.5–5.3)
PROT SERPL-MCNC: 7.7 G/DL (ref 6.4–8.2)
PROT UR STRIP-MCNC: NEGATIVE MG/DL
PROTHROMBIN TIME: 12.5 SECONDS (ref 11.6–14.5)
QRS AXIS: 51 DEGREES
QRSD INTERVAL: 86 MS
QT INTERVAL: 388 MS
QTC INTERVAL: 454 MS
RBC # BLD AUTO: 3.97 MILLION/UL (ref 3.88–5.62)
RBC # BLD AUTO: 4.26 MILLION/UL (ref 3.88–5.62)
RH BLD: NEGATIVE
RH BLD: NEGATIVE
SODIUM SERPL-SCNC: 141 MMOL/L (ref 136–145)
SODIUM SERPL-SCNC: 143 MMOL/L (ref 136–145)
SP GR UR STRIP.AUTO: 1.01 (ref 1–1.03)
SPECIMEN EXPIRATION DATE: NORMAL
T WAVE AXIS: 74 DEGREES
TROPONIN I SERPL-MCNC: <0.02 NG/ML
UROBILINOGEN UR QL STRIP.AUTO: 0.2 E.U./DL
VENTRICULAR RATE: 82 BPM
WBC # BLD AUTO: 10.05 THOUSAND/UL (ref 4.31–10.16)
WBC # BLD AUTO: 8.54 THOUSAND/UL (ref 4.31–10.16)

## 2021-11-05 PROCEDURE — 72125 CT NECK SPINE W/O DYE: CPT

## 2021-11-05 PROCEDURE — C1713 ANCHOR/SCREW BN/BN,TIS/BN: HCPCS | Performed by: ORTHOPAEDIC SURGERY

## 2021-11-05 PROCEDURE — 70450 CT HEAD/BRAIN W/O DYE: CPT

## 2021-11-05 PROCEDURE — 93005 ELECTROCARDIOGRAM TRACING: CPT

## 2021-11-05 PROCEDURE — 0QS606Z REPOSITION RIGHT UPPER FEMUR WITH INTRAMEDULLARY INTERNAL FIXATION DEVICE, OPEN APPROACH: ICD-10-PCS | Performed by: ORTHOPAEDIC SURGERY

## 2021-11-05 PROCEDURE — 71260 CT THORAX DX C+: CPT

## 2021-11-05 PROCEDURE — 74177 CT ABD & PELVIS W/CONTRAST: CPT

## 2021-11-05 PROCEDURE — 86900 BLOOD TYPING SEROLOGIC ABO: CPT

## 2021-11-05 PROCEDURE — 96361 HYDRATE IV INFUSION ADD-ON: CPT

## 2021-11-05 PROCEDURE — 99222 1ST HOSP IP/OBS MODERATE 55: CPT | Performed by: SURGERY

## 2021-11-05 PROCEDURE — 80048 BASIC METABOLIC PNL TOTAL CA: CPT | Performed by: PHYSICIAN ASSISTANT

## 2021-11-05 PROCEDURE — 96374 THER/PROPH/DIAG INJ IV PUSH: CPT

## 2021-11-05 PROCEDURE — 96376 TX/PRO/DX INJ SAME DRUG ADON: CPT

## 2021-11-05 PROCEDURE — 96375 TX/PRO/DX INJ NEW DRUG ADDON: CPT

## 2021-11-05 PROCEDURE — 73552 X-RAY EXAM OF FEMUR 2/>: CPT

## 2021-11-05 PROCEDURE — 99222 1ST HOSP IP/OBS MODERATE 55: CPT | Performed by: INTERNAL MEDICINE

## 2021-11-05 PROCEDURE — 85027 COMPLETE CBC AUTOMATED: CPT | Performed by: PHYSICIAN ASSISTANT

## 2021-11-05 PROCEDURE — G9197 ORDER FOR CEPH: HCPCS | Performed by: ORTHOPAEDIC SURGERY

## 2021-11-05 PROCEDURE — 86850 RBC ANTIBODY SCREEN: CPT

## 2021-11-05 PROCEDURE — G1004 CDSM NDSC: HCPCS

## 2021-11-05 PROCEDURE — 99223 1ST HOSP IP/OBS HIGH 75: CPT | Performed by: ORTHOPAEDIC SURGERY

## 2021-11-05 PROCEDURE — 81003 URINALYSIS AUTO W/O SCOPE: CPT

## 2021-11-05 PROCEDURE — 86901 BLOOD TYPING SEROLOGIC RH(D): CPT

## 2021-11-05 PROCEDURE — 27245 TREAT THIGH FRACTURE: CPT | Performed by: ORTHOPAEDIC SURGERY

## 2021-11-05 PROCEDURE — 93010 ELECTROCARDIOGRAM REPORT: CPT | Performed by: INTERNAL MEDICINE

## 2021-11-05 PROCEDURE — 36415 COLL VENOUS BLD VENIPUNCTURE: CPT

## 2021-11-05 DEVICE — TFNA FENESTRATED HELICAL BLADE 100MM - STERILE
Type: IMPLANTABLE DEVICE | Site: LEG | Status: FUNCTIONAL
Brand: TFN-ADVANCE

## 2021-11-05 DEVICE — 5.0MM TI LOCKING SCREW W/T25 STARDRIVE 58MM F/IM NAIL-STER: Type: IMPLANTABLE DEVICE | Site: LEG | Status: FUNCTIONAL

## 2021-11-05 DEVICE — 12MM/130 DEG TI CANN TFNA 420MM/RIGHT-STERILE
Type: IMPLANTABLE DEVICE | Site: LEG | Status: FUNCTIONAL
Brand: TFN-ADVANCE

## 2021-11-05 RX ORDER — FENTANYL CITRATE/PF 50 MCG/ML
25 SYRINGE (ML) INJECTION
Status: DISCONTINUED | OUTPATIENT
Start: 2021-11-05 | End: 2021-11-05 | Stop reason: HOSPADM

## 2021-11-05 RX ORDER — LIDOCAINE HYDROCHLORIDE 10 MG/ML
INJECTION, SOLUTION EPIDURAL; INFILTRATION; INTRACAUDAL; PERINEURAL AS NEEDED
Status: DISCONTINUED | OUTPATIENT
Start: 2021-11-05 | End: 2021-11-05

## 2021-11-05 RX ORDER — ONDANSETRON 2 MG/ML
4 INJECTION INTRAMUSCULAR; INTRAVENOUS EVERY 6 HOURS PRN
Status: DISCONTINUED | OUTPATIENT
Start: 2021-11-05 | End: 2021-11-10 | Stop reason: HOSPADM

## 2021-11-05 RX ORDER — SODIUM CHLORIDE, SODIUM LACTATE, POTASSIUM CHLORIDE, CALCIUM CHLORIDE 600; 310; 30; 20 MG/100ML; MG/100ML; MG/100ML; MG/100ML
INJECTION, SOLUTION INTRAVENOUS CONTINUOUS PRN
Status: DISCONTINUED | OUTPATIENT
Start: 2021-11-05 | End: 2021-11-05

## 2021-11-05 RX ORDER — ACETAMINOPHEN 325 MG/1
975 TABLET ORAL EVERY 8 HOURS SCHEDULED
Status: DISCONTINUED | OUTPATIENT
Start: 2021-11-05 | End: 2021-11-06

## 2021-11-05 RX ORDER — HYDROMORPHONE HCL/PF 1 MG/ML
0.2 SYRINGE (ML) INJECTION EVERY 4 HOURS PRN
Status: DISCONTINUED | OUTPATIENT
Start: 2021-11-05 | End: 2021-11-10 | Stop reason: HOSPADM

## 2021-11-05 RX ORDER — ONDANSETRON 2 MG/ML
4 INJECTION INTRAMUSCULAR; INTRAVENOUS ONCE
Status: COMPLETED | OUTPATIENT
Start: 2021-11-05 | End: 2021-11-05

## 2021-11-05 RX ORDER — PROPOFOL 10 MG/ML
INJECTION, EMULSION INTRAVENOUS AS NEEDED
Status: DISCONTINUED | OUTPATIENT
Start: 2021-11-05 | End: 2021-11-05

## 2021-11-05 RX ORDER — MAGNESIUM HYDROXIDE 1200 MG/15ML
LIQUID ORAL AS NEEDED
Status: DISCONTINUED | OUTPATIENT
Start: 2021-11-05 | End: 2021-11-05 | Stop reason: HOSPADM

## 2021-11-05 RX ORDER — DOCUSATE SODIUM 100 MG/1
100 CAPSULE, LIQUID FILLED ORAL 2 TIMES DAILY
Status: DISCONTINUED | OUTPATIENT
Start: 2021-11-05 | End: 2021-11-06

## 2021-11-05 RX ORDER — FENTANYL CITRATE 50 UG/ML
INJECTION, SOLUTION INTRAMUSCULAR; INTRAVENOUS AS NEEDED
Status: DISCONTINUED | OUTPATIENT
Start: 2021-11-05 | End: 2021-11-05

## 2021-11-05 RX ORDER — DEXAMETHASONE SODIUM PHOSPHATE 4 MG/ML
INJECTION, SOLUTION INTRA-ARTICULAR; INTRALESIONAL; INTRAMUSCULAR; INTRAVENOUS; SOFT TISSUE AS NEEDED
Status: DISCONTINUED | OUTPATIENT
Start: 2021-11-05 | End: 2021-11-05

## 2021-11-05 RX ORDER — ONDANSETRON 2 MG/ML
INJECTION INTRAMUSCULAR; INTRAVENOUS AS NEEDED
Status: DISCONTINUED | OUTPATIENT
Start: 2021-11-05 | End: 2021-11-05

## 2021-11-05 RX ORDER — ROCURONIUM BROMIDE 10 MG/ML
INJECTION, SOLUTION INTRAVENOUS AS NEEDED
Status: DISCONTINUED | OUTPATIENT
Start: 2021-11-05 | End: 2021-11-05

## 2021-11-05 RX ORDER — ONDANSETRON 2 MG/ML
4 INJECTION INTRAMUSCULAR; INTRAVENOUS ONCE AS NEEDED
Status: DISCONTINUED | OUTPATIENT
Start: 2021-11-05 | End: 2021-11-05 | Stop reason: HOSPADM

## 2021-11-05 RX ORDER — DONEPEZIL HYDROCHLORIDE 5 MG/1
10 TABLET, FILM COATED ORAL DAILY
Status: DISCONTINUED | OUTPATIENT
Start: 2021-11-05 | End: 2021-11-06

## 2021-11-05 RX ORDER — CEFAZOLIN SODIUM 1 G/3ML
INJECTION, POWDER, FOR SOLUTION INTRAMUSCULAR; INTRAVENOUS AS NEEDED
Status: DISCONTINUED | OUTPATIENT
Start: 2021-11-05 | End: 2021-11-05

## 2021-11-05 RX ORDER — OXYCODONE HYDROCHLORIDE 5 MG/1
5 TABLET ORAL EVERY 4 HOURS PRN
Status: DISCONTINUED | OUTPATIENT
Start: 2021-11-05 | End: 2021-11-06

## 2021-11-05 RX ORDER — POLYETHYLENE GLYCOL 3350 17 G/17G
17 POWDER, FOR SOLUTION ORAL DAILY
Status: DISCONTINUED | OUTPATIENT
Start: 2021-11-05 | End: 2021-11-06

## 2021-11-05 RX ORDER — OXYCODONE HYDROCHLORIDE 5 MG/1
2.5 TABLET ORAL EVERY 4 HOURS PRN
Status: DISCONTINUED | OUTPATIENT
Start: 2021-11-05 | End: 2021-11-06

## 2021-11-05 RX ORDER — GABAPENTIN 100 MG/1
100 CAPSULE ORAL
Status: DISCONTINUED | OUTPATIENT
Start: 2021-11-05 | End: 2021-11-06

## 2021-11-05 RX ORDER — GLYCOPYRROLATE 0.2 MG/ML
INJECTION INTRAMUSCULAR; INTRAVENOUS AS NEEDED
Status: DISCONTINUED | OUTPATIENT
Start: 2021-11-05 | End: 2021-11-05

## 2021-11-05 RX ORDER — NEOSTIGMINE METHYLSULFATE 1 MG/ML
INJECTION INTRAVENOUS AS NEEDED
Status: DISCONTINUED | OUTPATIENT
Start: 2021-11-05 | End: 2021-11-05

## 2021-11-05 RX ORDER — SODIUM CHLORIDE, SODIUM GLUCONATE, SODIUM ACETATE, POTASSIUM CHLORIDE, MAGNESIUM CHLORIDE, SODIUM PHOSPHATE, DIBASIC, AND POTASSIUM PHOSPHATE .53; .5; .37; .037; .03; .012; .00082 G/100ML; G/100ML; G/100ML; G/100ML; G/100ML; G/100ML; G/100ML
125 INJECTION, SOLUTION INTRAVENOUS CONTINUOUS
Status: DISCONTINUED | OUTPATIENT
Start: 2021-11-05 | End: 2021-11-07

## 2021-11-05 RX ORDER — CEFAZOLIN SODIUM 1 G/50ML
1000 SOLUTION INTRAVENOUS EVERY 8 HOURS
Status: COMPLETED | OUTPATIENT
Start: 2021-11-05 | End: 2021-11-06

## 2021-11-05 RX ORDER — HEPARIN SODIUM 5000 [USP'U]/ML
5000 INJECTION, SOLUTION INTRAVENOUS; SUBCUTANEOUS EVERY 8 HOURS SCHEDULED
Status: DISCONTINUED | OUTPATIENT
Start: 2021-11-05 | End: 2021-11-05

## 2021-11-05 RX ORDER — TAMSULOSIN HYDROCHLORIDE 0.4 MG/1
0.4 CAPSULE ORAL DAILY
Status: DISCONTINUED | OUTPATIENT
Start: 2021-11-05 | End: 2021-11-06

## 2021-11-05 RX ORDER — PROPOFOL 10 MG/ML
INJECTION, EMULSION INTRAVENOUS CONTINUOUS PRN
Status: DISCONTINUED | OUTPATIENT
Start: 2021-11-05 | End: 2021-11-05

## 2021-11-05 RX ADMIN — ONDANSETRON 4 MG: 2 INJECTION INTRAMUSCULAR; INTRAVENOUS at 13:07

## 2021-11-05 RX ADMIN — PROPOFOL 70 MCG/KG/MIN: 10 INJECTION, EMULSION INTRAVENOUS at 11:59

## 2021-11-05 RX ADMIN — PHENYLEPHRINE HYDROCHLORIDE 50 MCG/MIN: 10 INJECTION INTRAVENOUS at 12:06

## 2021-11-05 RX ADMIN — PROPOFOL 30 MG: 10 INJECTION, EMULSION INTRAVENOUS at 12:04

## 2021-11-05 RX ADMIN — NEOSTIGMINE METHYLSULFATE 4 MG: 1 INJECTION INTRAVENOUS at 13:25

## 2021-11-05 RX ADMIN — GLYCOPYRROLATE 0.8 MG: 0.2 INJECTION, SOLUTION INTRAMUSCULAR; INTRAVENOUS at 13:25

## 2021-11-05 RX ADMIN — ROCURONIUM BROMIDE 10 MG: 10 INJECTION, SOLUTION INTRAVENOUS at 12:06

## 2021-11-05 RX ADMIN — ROCURONIUM BROMIDE 40 MG: 10 INJECTION, SOLUTION INTRAVENOUS at 11:59

## 2021-11-05 RX ADMIN — MORPHINE SULFATE 2 MG: 2 INJECTION, SOLUTION INTRAMUSCULAR; INTRAVENOUS at 00:33

## 2021-11-05 RX ADMIN — CEFAZOLIN SODIUM 1000 MG: 1 SOLUTION INTRAVENOUS at 20:18

## 2021-11-05 RX ADMIN — SODIUM CHLORIDE 250 ML: 0.9 INJECTION, SOLUTION INTRAVENOUS at 00:53

## 2021-11-05 RX ADMIN — PROPOFOL 20 MG: 10 INJECTION, EMULSION INTRAVENOUS at 12:40

## 2021-11-05 RX ADMIN — LIDOCAINE HYDROCHLORIDE 50 MG: 10 INJECTION, SOLUTION EPIDURAL; INFILTRATION; INTRACAUDAL at 11:59

## 2021-11-05 RX ADMIN — ROCURONIUM BROMIDE 20 MG: 10 INJECTION, SOLUTION INTRAVENOUS at 12:58

## 2021-11-05 RX ADMIN — CEFAZOLIN SODIUM 2000 MG: 1 INJECTION, POWDER, FOR SOLUTION INTRAMUSCULAR; INTRAVENOUS at 12:19

## 2021-11-05 RX ADMIN — HEPARIN SODIUM 5000 UNITS: 5000 INJECTION INTRAVENOUS; SUBCUTANEOUS at 05:08

## 2021-11-05 RX ADMIN — CARBAMIDE PEROXIDE 6.5% 5 DROP: 6.5 LIQUID AURICULAR (OTIC) at 18:50

## 2021-11-05 RX ADMIN — DEXAMETHASONE SODIUM PHOSPHATE 4 MG: 4 INJECTION INTRA-ARTICULAR; INTRALESIONAL; INTRAMUSCULAR; INTRAVENOUS; SOFT TISSUE at 12:21

## 2021-11-05 RX ADMIN — FENTANYL CITRATE 25 MCG: 50 INJECTION, SOLUTION INTRAMUSCULAR; INTRAVENOUS at 11:59

## 2021-11-05 RX ADMIN — IOHEXOL 85 ML: 350 INJECTION, SOLUTION INTRAVENOUS at 01:49

## 2021-11-05 RX ADMIN — FENTANYL CITRATE 75 MCG: 50 INJECTION, SOLUTION INTRAMUSCULAR; INTRAVENOUS at 12:40

## 2021-11-05 RX ADMIN — PROPOFOL 20 MG: 10 INJECTION, EMULSION INTRAVENOUS at 12:02

## 2021-11-05 RX ADMIN — MORPHINE SULFATE 2 MG: 2 INJECTION, SOLUTION INTRAMUSCULAR; INTRAVENOUS at 01:56

## 2021-11-05 RX ADMIN — ENOXAPARIN SODIUM 30 MG: 30 INJECTION SUBCUTANEOUS at 22:20

## 2021-11-05 RX ADMIN — PROPOFOL 50 MG: 10 INJECTION, EMULSION INTRAVENOUS at 12:06

## 2021-11-05 RX ADMIN — SODIUM CHLORIDE, SODIUM GLUCONATE, SODIUM ACETATE, POTASSIUM CHLORIDE, MAGNESIUM CHLORIDE, SODIUM PHOSPHATE, DIBASIC, AND POTASSIUM PHOSPHATE 125 ML/HR: .53; .5; .37; .037; .03; .012; .00082 INJECTION, SOLUTION INTRAVENOUS at 05:08

## 2021-11-05 RX ADMIN — SODIUM CHLORIDE, SODIUM LACTATE, POTASSIUM CHLORIDE, AND CALCIUM CHLORIDE: .6; .31; .03; .02 INJECTION, SOLUTION INTRAVENOUS at 11:44

## 2021-11-05 RX ADMIN — ONDANSETRON 4 MG: 2 INJECTION INTRAMUSCULAR; INTRAVENOUS at 00:33

## 2021-11-05 RX ADMIN — ROCURONIUM BROMIDE 20 MG: 10 INJECTION, SOLUTION INTRAVENOUS at 12:40

## 2021-11-05 RX ADMIN — SODIUM CHLORIDE, SODIUM GLUCONATE, SODIUM ACETATE, POTASSIUM CHLORIDE, MAGNESIUM CHLORIDE, SODIUM PHOSPHATE, DIBASIC, AND POTASSIUM PHOSPHATE 125 ML/HR: .53; .5; .37; .037; .03; .012; .00082 INJECTION, SOLUTION INTRAVENOUS at 16:17

## 2021-11-05 RX ADMIN — PROPOFOL 100 MG: 10 INJECTION, EMULSION INTRAVENOUS at 11:59

## 2021-11-05 RX ADMIN — HYDROMORPHONE HYDROCHLORIDE 0.2 MG: 1 INJECTION, SOLUTION INTRAMUSCULAR; INTRAVENOUS; SUBCUTANEOUS at 03:37

## 2021-11-06 LAB
ANION GAP SERPL CALCULATED.3IONS-SCNC: 8 MMOL/L (ref 4–13)
BUN SERPL-MCNC: 19 MG/DL (ref 5–25)
CALCIUM SERPL-MCNC: 8.4 MG/DL (ref 8.3–10.1)
CHLORIDE SERPL-SCNC: 109 MMOL/L (ref 100–108)
CO2 SERPL-SCNC: 23 MMOL/L (ref 21–32)
CREAT SERPL-MCNC: 0.89 MG/DL (ref 0.6–1.3)
ERYTHROCYTE [DISTWIDTH] IN BLOOD BY AUTOMATED COUNT: 13.6 % (ref 11.6–15.1)
GFR SERPL CREATININE-BSD FRML MDRD: 82 ML/MIN/1.73SQ M
GLUCOSE SERPL-MCNC: 117 MG/DL (ref 65–140)
HCT VFR BLD AUTO: 39.7 % (ref 36.5–49.3)
HGB BLD-MCNC: 12.8 G/DL (ref 12–17)
MCH RBC QN AUTO: 29.1 PG (ref 26.8–34.3)
MCHC RBC AUTO-ENTMCNC: 32.2 G/DL (ref 31.4–37.4)
MCV RBC AUTO: 90 FL (ref 82–98)
PLATELET # BLD AUTO: 202 THOUSANDS/UL (ref 149–390)
PMV BLD AUTO: 10.6 FL (ref 8.9–12.7)
POTASSIUM SERPL-SCNC: 4.2 MMOL/L (ref 3.5–5.3)
RBC # BLD AUTO: 4.4 MILLION/UL (ref 3.88–5.62)
SODIUM SERPL-SCNC: 140 MMOL/L (ref 136–145)
WBC # BLD AUTO: 6.9 THOUSAND/UL (ref 4.31–10.16)

## 2021-11-06 PROCEDURE — 85027 COMPLETE CBC AUTOMATED: CPT | Performed by: PHYSICIAN ASSISTANT

## 2021-11-06 PROCEDURE — 99233 SBSQ HOSP IP/OBS HIGH 50: CPT | Performed by: SURGERY

## 2021-11-06 PROCEDURE — 97163 PT EVAL HIGH COMPLEX 45 MIN: CPT

## 2021-11-06 PROCEDURE — 92610 EVALUATE SWALLOWING FUNCTION: CPT

## 2021-11-06 PROCEDURE — 97167 OT EVAL HIGH COMPLEX 60 MIN: CPT

## 2021-11-06 PROCEDURE — 80048 BASIC METABOLIC PNL TOTAL CA: CPT | Performed by: PHYSICIAN ASSISTANT

## 2021-11-06 PROCEDURE — 99024 POSTOP FOLLOW-UP VISIT: CPT | Performed by: PHYSICIAN ASSISTANT

## 2021-11-06 RX ORDER — HYDROMORPHONE HCL/PF 1 MG/ML
0.5 SYRINGE (ML) INJECTION EVERY 4 HOURS PRN
Status: DISCONTINUED | OUTPATIENT
Start: 2021-11-06 | End: 2021-11-07

## 2021-11-06 RX ORDER — HYDROMORPHONE HCL/PF 1 MG/ML
0.2 SYRINGE (ML) INJECTION EVERY 4 HOURS PRN
Status: DISCONTINUED | OUTPATIENT
Start: 2021-11-06 | End: 2021-11-07

## 2021-11-06 RX ADMIN — CARBAMIDE PEROXIDE 6.5% 5 DROP: 6.5 LIQUID AURICULAR (OTIC) at 18:15

## 2021-11-06 RX ADMIN — ENOXAPARIN SODIUM 30 MG: 30 INJECTION SUBCUTANEOUS at 10:41

## 2021-11-06 RX ADMIN — SODIUM CHLORIDE, SODIUM GLUCONATE, SODIUM ACETATE, POTASSIUM CHLORIDE, MAGNESIUM CHLORIDE, SODIUM PHOSPHATE, DIBASIC, AND POTASSIUM PHOSPHATE 125 ML/HR: .53; .5; .37; .037; .03; .012; .00082 INJECTION, SOLUTION INTRAVENOUS at 22:55

## 2021-11-06 RX ADMIN — CEFAZOLIN SODIUM 1000 MG: 1 SOLUTION INTRAVENOUS at 04:13

## 2021-11-06 RX ADMIN — ENOXAPARIN SODIUM 30 MG: 30 INJECTION SUBCUTANEOUS at 21:20

## 2021-11-06 RX ADMIN — CARBAMIDE PEROXIDE 6.5% 5 DROP: 6.5 LIQUID AURICULAR (OTIC) at 10:41

## 2021-11-07 LAB
ANION GAP SERPL CALCULATED.3IONS-SCNC: 9 MMOL/L (ref 4–13)
BUN SERPL-MCNC: 16 MG/DL (ref 5–25)
CALCIUM SERPL-MCNC: 8.1 MG/DL (ref 8.3–10.1)
CHLORIDE SERPL-SCNC: 106 MMOL/L (ref 100–108)
CO2 SERPL-SCNC: 21 MMOL/L (ref 21–32)
CREAT SERPL-MCNC: 0.67 MG/DL (ref 0.6–1.3)
ERYTHROCYTE [DISTWIDTH] IN BLOOD BY AUTOMATED COUNT: 13.3 % (ref 11.6–15.1)
GFR SERPL CREATININE-BSD FRML MDRD: 93 ML/MIN/1.73SQ M
GLUCOSE SERPL-MCNC: 101 MG/DL (ref 65–140)
HCT VFR BLD AUTO: 28.8 % (ref 36.5–49.3)
HGB BLD-MCNC: 9.5 G/DL (ref 12–17)
MCH RBC QN AUTO: 29.4 PG (ref 26.8–34.3)
MCHC RBC AUTO-ENTMCNC: 33 G/DL (ref 31.4–37.4)
MCV RBC AUTO: 89 FL (ref 82–98)
PLATELET # BLD AUTO: 189 THOUSANDS/UL (ref 149–390)
PMV BLD AUTO: 10.5 FL (ref 8.9–12.7)
POTASSIUM SERPL-SCNC: 3.7 MMOL/L (ref 3.5–5.3)
RBC # BLD AUTO: 3.23 MILLION/UL (ref 3.88–5.62)
SODIUM SERPL-SCNC: 136 MMOL/L (ref 136–145)
WBC # BLD AUTO: 7.29 THOUSAND/UL (ref 4.31–10.16)

## 2021-11-07 PROCEDURE — 92526 ORAL FUNCTION THERAPY: CPT

## 2021-11-07 PROCEDURE — 85027 COMPLETE CBC AUTOMATED: CPT | Performed by: PHYSICIAN ASSISTANT

## 2021-11-07 PROCEDURE — 99024 POSTOP FOLLOW-UP VISIT: CPT | Performed by: PHYSICIAN ASSISTANT

## 2021-11-07 PROCEDURE — 99232 SBSQ HOSP IP/OBS MODERATE 35: CPT | Performed by: SURGERY

## 2021-11-07 PROCEDURE — 80048 BASIC METABOLIC PNL TOTAL CA: CPT | Performed by: PHYSICIAN ASSISTANT

## 2021-11-07 RX ORDER — PRAVASTATIN SODIUM 20 MG
20 TABLET ORAL
Status: DISCONTINUED | OUTPATIENT
Start: 2021-11-07 | End: 2021-11-10 | Stop reason: HOSPADM

## 2021-11-07 RX ORDER — QUETIAPINE FUMARATE 25 MG/1
25 TABLET, FILM COATED ORAL
Status: DISCONTINUED | OUTPATIENT
Start: 2021-11-07 | End: 2021-11-10 | Stop reason: HOSPADM

## 2021-11-07 RX ORDER — ACETAMINOPHEN 325 MG/1
975 TABLET ORAL EVERY 8 HOURS
Status: DISCONTINUED | OUTPATIENT
Start: 2021-11-07 | End: 2021-11-10 | Stop reason: HOSPADM

## 2021-11-07 RX ORDER — CITALOPRAM 20 MG/1
20 TABLET ORAL DAILY
Status: DISCONTINUED | OUTPATIENT
Start: 2021-11-07 | End: 2021-11-10 | Stop reason: HOSPADM

## 2021-11-07 RX ORDER — MEMANTINE HYDROCHLORIDE 5 MG/1
5 TABLET ORAL 2 TIMES DAILY
Status: DISCONTINUED | OUTPATIENT
Start: 2021-11-07 | End: 2021-11-10 | Stop reason: HOSPADM

## 2021-11-07 RX ORDER — OXYCODONE HYDROCHLORIDE 5 MG/1
5 TABLET ORAL EVERY 4 HOURS PRN
Status: DISCONTINUED | OUTPATIENT
Start: 2021-11-07 | End: 2021-11-10 | Stop reason: HOSPADM

## 2021-11-07 RX ORDER — FERROUS SULFATE 325(65) MG
325 TABLET ORAL 2 TIMES DAILY WITH MEALS
Status: DISCONTINUED | OUTPATIENT
Start: 2021-11-07 | End: 2021-11-07 | Stop reason: DRUGHIGH

## 2021-11-07 RX ORDER — DONEPEZIL HYDROCHLORIDE 5 MG/1
10 TABLET, FILM COATED ORAL
Status: DISCONTINUED | OUTPATIENT
Start: 2021-11-07 | End: 2021-11-10 | Stop reason: HOSPADM

## 2021-11-07 RX ORDER — OXYCODONE HYDROCHLORIDE 5 MG/1
2.5 TABLET ORAL EVERY 4 HOURS PRN
Status: DISCONTINUED | OUTPATIENT
Start: 2021-11-07 | End: 2021-11-10 | Stop reason: HOSPADM

## 2021-11-07 RX ORDER — FERROUS SULFATE 325(65) MG
325 TABLET ORAL
Status: DISCONTINUED | OUTPATIENT
Start: 2021-11-07 | End: 2021-11-10 | Stop reason: HOSPADM

## 2021-11-07 RX ORDER — ASCORBIC ACID 500 MG
500 TABLET ORAL DAILY
Status: DISCONTINUED | OUTPATIENT
Start: 2021-11-07 | End: 2021-11-10 | Stop reason: HOSPADM

## 2021-11-07 RX ORDER — ASPIRIN 81 MG/1
81 TABLET, CHEWABLE ORAL DAILY
Status: DISCONTINUED | OUTPATIENT
Start: 2021-11-07 | End: 2021-11-10 | Stop reason: HOSPADM

## 2021-11-07 RX ORDER — TAMSULOSIN HYDROCHLORIDE 0.4 MG/1
0.4 CAPSULE ORAL
Status: DISCONTINUED | OUTPATIENT
Start: 2021-11-07 | End: 2021-11-10 | Stop reason: HOSPADM

## 2021-11-07 RX ADMIN — ENOXAPARIN SODIUM 30 MG: 30 INJECTION SUBCUTANEOUS at 21:55

## 2021-11-07 RX ADMIN — TAMSULOSIN HYDROCHLORIDE 0.4 MG: 0.4 CAPSULE ORAL at 17:56

## 2021-11-07 RX ADMIN — OXYCODONE HYDROCHLORIDE AND ACETAMINOPHEN 500 MG: 500 TABLET ORAL at 11:54

## 2021-11-07 RX ADMIN — FERROUS SULFATE TAB 325 MG (65 MG ELEMENTAL FE) 325 MG: 325 (65 FE) TAB at 17:56

## 2021-11-07 RX ADMIN — CARBAMIDE PEROXIDE 6.5% 5 DROP: 6.5 LIQUID AURICULAR (OTIC) at 08:40

## 2021-11-07 RX ADMIN — CARBAMIDE PEROXIDE 6.5% 5 DROP: 6.5 LIQUID AURICULAR (OTIC) at 17:57

## 2021-11-07 RX ADMIN — QUETIAPINE FUMARATE 25 MG: 25 TABLET ORAL at 21:55

## 2021-11-07 RX ADMIN — PRAVASTATIN SODIUM 20 MG: 20 TABLET ORAL at 17:56

## 2021-11-07 RX ADMIN — ASPIRIN 81 MG: 81 TABLET, CHEWABLE ORAL at 11:54

## 2021-11-07 RX ADMIN — ACETAMINOPHEN 975 MG: 325 TABLET, FILM COATED ORAL at 11:54

## 2021-11-07 RX ADMIN — SODIUM CHLORIDE, SODIUM GLUCONATE, SODIUM ACETATE, POTASSIUM CHLORIDE, MAGNESIUM CHLORIDE, SODIUM PHOSPHATE, DIBASIC, AND POTASSIUM PHOSPHATE 125 ML/HR: .53; .5; .37; .037; .03; .012; .00082 INJECTION, SOLUTION INTRAVENOUS at 06:25

## 2021-11-07 RX ADMIN — CITALOPRAM HYDROBROMIDE 20 MG: 20 TABLET ORAL at 11:54

## 2021-11-07 RX ADMIN — DONEPEZIL HYDROCHLORIDE 10 MG: 5 TABLET, FILM COATED ORAL at 21:55

## 2021-11-07 RX ADMIN — ENOXAPARIN SODIUM 30 MG: 30 INJECTION SUBCUTANEOUS at 08:40

## 2021-11-07 RX ADMIN — ACETAMINOPHEN 975 MG: 325 TABLET, FILM COATED ORAL at 17:56

## 2021-11-07 RX ADMIN — MEMANTINE 5 MG: 5 TABLET ORAL at 17:57

## 2021-11-07 RX ADMIN — MEMANTINE 5 MG: 5 TABLET ORAL at 11:54

## 2021-11-08 LAB
ANION GAP SERPL CALCULATED.3IONS-SCNC: 11 MMOL/L (ref 4–13)
BUN SERPL-MCNC: 21 MG/DL (ref 5–25)
CALCIUM SERPL-MCNC: 8.1 MG/DL (ref 8.3–10.1)
CHLORIDE SERPL-SCNC: 110 MMOL/L (ref 100–108)
CO2 SERPL-SCNC: 21 MMOL/L (ref 21–32)
CREAT SERPL-MCNC: 0.68 MG/DL (ref 0.6–1.3)
ERYTHROCYTE [DISTWIDTH] IN BLOOD BY AUTOMATED COUNT: 13.2 % (ref 11.6–15.1)
GFR SERPL CREATININE-BSD FRML MDRD: 92 ML/MIN/1.73SQ M
GLUCOSE SERPL-MCNC: 117 MG/DL (ref 65–140)
HCT VFR BLD AUTO: 27.8 % (ref 36.5–49.3)
HGB BLD-MCNC: 9 G/DL (ref 12–17)
MCH RBC QN AUTO: 28.8 PG (ref 26.8–34.3)
MCHC RBC AUTO-ENTMCNC: 32.4 G/DL (ref 31.4–37.4)
MCV RBC AUTO: 89 FL (ref 82–98)
PLATELET # BLD AUTO: 179 THOUSANDS/UL (ref 149–390)
PMV BLD AUTO: 10.6 FL (ref 8.9–12.7)
POTASSIUM SERPL-SCNC: 3.4 MMOL/L (ref 3.5–5.3)
RBC # BLD AUTO: 3.12 MILLION/UL (ref 3.88–5.62)
SODIUM SERPL-SCNC: 142 MMOL/L (ref 136–145)
WBC # BLD AUTO: 6.82 THOUSAND/UL (ref 4.31–10.16)

## 2021-11-08 PROCEDURE — 97530 THERAPEUTIC ACTIVITIES: CPT

## 2021-11-08 PROCEDURE — 97110 THERAPEUTIC EXERCISES: CPT

## 2021-11-08 PROCEDURE — 99232 SBSQ HOSP IP/OBS MODERATE 35: CPT | Performed by: EMERGENCY MEDICINE

## 2021-11-08 PROCEDURE — 80048 BASIC METABOLIC PNL TOTAL CA: CPT | Performed by: PHYSICIAN ASSISTANT

## 2021-11-08 PROCEDURE — 85027 COMPLETE CBC AUTOMATED: CPT | Performed by: PHYSICIAN ASSISTANT

## 2021-11-08 PROCEDURE — 99024 POSTOP FOLLOW-UP VISIT: CPT | Performed by: PHYSICIAN ASSISTANT

## 2021-11-08 PROCEDURE — 99232 SBSQ HOSP IP/OBS MODERATE 35: CPT | Performed by: INTERNAL MEDICINE

## 2021-11-08 PROCEDURE — NC001 PR NO CHARGE: Performed by: INTERNAL MEDICINE

## 2021-11-08 RX ORDER — POLYETHYLENE GLYCOL 3350 17 G/17G
17 POWDER, FOR SOLUTION ORAL DAILY
Status: DISCONTINUED | OUTPATIENT
Start: 2021-11-08 | End: 2021-11-10 | Stop reason: HOSPADM

## 2021-11-08 RX ORDER — AMOXICILLIN 250 MG
1 CAPSULE ORAL
Status: DISCONTINUED | OUTPATIENT
Start: 2021-11-08 | End: 2021-11-10 | Stop reason: HOSPADM

## 2021-11-08 RX ADMIN — DONEPEZIL HYDROCHLORIDE 10 MG: 5 TABLET, FILM COATED ORAL at 21:53

## 2021-11-08 RX ADMIN — OXYCODONE HYDROCHLORIDE AND ACETAMINOPHEN 500 MG: 500 TABLET ORAL at 10:33

## 2021-11-08 RX ADMIN — MEMANTINE 5 MG: 5 TABLET ORAL at 17:31

## 2021-11-08 RX ADMIN — CARBAMIDE PEROXIDE 6.5% 5 DROP: 6.5 LIQUID AURICULAR (OTIC) at 17:32

## 2021-11-08 RX ADMIN — ENOXAPARIN SODIUM 30 MG: 30 INJECTION SUBCUTANEOUS at 10:33

## 2021-11-08 RX ADMIN — POLYETHYLENE GLYCOL 3350 17 G: 17 POWDER, FOR SOLUTION ORAL at 13:27

## 2021-11-08 RX ADMIN — DOCUSATE SODIUM AND SENNOSIDES 1 TABLET: 8.6; 5 TABLET ORAL at 21:53

## 2021-11-08 RX ADMIN — ENOXAPARIN SODIUM 30 MG: 30 INJECTION SUBCUTANEOUS at 21:53

## 2021-11-08 RX ADMIN — CARBAMIDE PEROXIDE 6.5% 5 DROP: 6.5 LIQUID AURICULAR (OTIC) at 10:42

## 2021-11-08 RX ADMIN — PRAVASTATIN SODIUM 20 MG: 20 TABLET ORAL at 17:30

## 2021-11-08 RX ADMIN — CITALOPRAM HYDROBROMIDE 20 MG: 20 TABLET ORAL at 10:33

## 2021-11-08 RX ADMIN — QUETIAPINE FUMARATE 25 MG: 25 TABLET ORAL at 21:53

## 2021-11-08 RX ADMIN — ASPIRIN 81 MG: 81 TABLET, CHEWABLE ORAL at 10:33

## 2021-11-08 RX ADMIN — ACETAMINOPHEN 975 MG: 325 TABLET, FILM COATED ORAL at 19:09

## 2021-11-08 RX ADMIN — MEMANTINE 5 MG: 5 TABLET ORAL at 10:33

## 2021-11-08 RX ADMIN — ACETAMINOPHEN 975 MG: 325 TABLET, FILM COATED ORAL at 10:33

## 2021-11-08 RX ADMIN — TAMSULOSIN HYDROCHLORIDE 0.4 MG: 0.4 CAPSULE ORAL at 17:30

## 2021-11-09 PROCEDURE — 99232 SBSQ HOSP IP/OBS MODERATE 35: CPT | Performed by: INTERNAL MEDICINE

## 2021-11-09 PROCEDURE — 99232 SBSQ HOSP IP/OBS MODERATE 35: CPT | Performed by: SURGERY

## 2021-11-09 PROCEDURE — 99024 POSTOP FOLLOW-UP VISIT: CPT | Performed by: PHYSICIAN ASSISTANT

## 2021-11-09 RX ORDER — POTASSIUM CHLORIDE 20 MEQ/1
20 TABLET, EXTENDED RELEASE ORAL DAILY
Status: DISCONTINUED | OUTPATIENT
Start: 2021-11-09 | End: 2021-11-10 | Stop reason: HOSPADM

## 2021-11-09 RX ADMIN — PRAVASTATIN SODIUM 20 MG: 20 TABLET ORAL at 16:42

## 2021-11-09 RX ADMIN — CITALOPRAM HYDROBROMIDE 20 MG: 20 TABLET ORAL at 08:56

## 2021-11-09 RX ADMIN — QUETIAPINE FUMARATE 25 MG: 25 TABLET ORAL at 21:20

## 2021-11-09 RX ADMIN — FERROUS SULFATE TAB 325 MG (65 MG ELEMENTAL FE) 325 MG: 325 (65 FE) TAB at 16:42

## 2021-11-09 RX ADMIN — DONEPEZIL HYDROCHLORIDE 10 MG: 5 TABLET, FILM COATED ORAL at 21:20

## 2021-11-09 RX ADMIN — ACETAMINOPHEN 975 MG: 325 TABLET, FILM COATED ORAL at 17:45

## 2021-11-09 RX ADMIN — ASPIRIN 81 MG: 81 TABLET, CHEWABLE ORAL at 08:56

## 2021-11-09 RX ADMIN — MEMANTINE 5 MG: 5 TABLET ORAL at 08:56

## 2021-11-09 RX ADMIN — TAMSULOSIN HYDROCHLORIDE 0.4 MG: 0.4 CAPSULE ORAL at 16:42

## 2021-11-09 RX ADMIN — MEMANTINE 5 MG: 5 TABLET ORAL at 17:43

## 2021-11-09 RX ADMIN — DOCUSATE SODIUM AND SENNOSIDES 1 TABLET: 8.6; 5 TABLET ORAL at 21:20

## 2021-11-09 RX ADMIN — OXYCODONE HYDROCHLORIDE AND ACETAMINOPHEN 500 MG: 500 TABLET ORAL at 08:56

## 2021-11-10 VITALS
WEIGHT: 183.2 LBS | OXYGEN SATURATION: 98 % | RESPIRATION RATE: 18 BRPM | SYSTOLIC BLOOD PRESSURE: 131 MMHG | BODY MASS INDEX: 25.65 KG/M2 | TEMPERATURE: 97.6 F | HEART RATE: 84 BPM | HEIGHT: 71 IN | DIASTOLIC BLOOD PRESSURE: 78 MMHG

## 2021-11-10 PROCEDURE — NC001 PR NO CHARGE: Performed by: EMERGENCY MEDICINE

## 2021-11-10 PROCEDURE — 99232 SBSQ HOSP IP/OBS MODERATE 35: CPT | Performed by: INTERNAL MEDICINE

## 2021-11-10 PROCEDURE — 99238 HOSP IP/OBS DSCHRG MGMT 30/<: CPT | Performed by: EMERGENCY MEDICINE

## 2021-11-10 PROCEDURE — 97530 THERAPEUTIC ACTIVITIES: CPT

## 2021-11-10 PROCEDURE — 99024 POSTOP FOLLOW-UP VISIT: CPT | Performed by: PHYSICIAN ASSISTANT

## 2021-11-10 RX ORDER — ACETAMINOPHEN 325 MG/1
975 TABLET ORAL EVERY 8 HOURS
Qty: 20 TABLET | Refills: 0 | Status: ON HOLD
Start: 2021-11-10 | End: 2021-12-02 | Stop reason: SDUPTHER

## 2021-11-10 RX ORDER — OXYCODONE HYDROCHLORIDE 5 MG/1
2.5 TABLET ORAL EVERY 4 HOURS PRN
Qty: 20 TABLET | Refills: 0 | Status: SHIPPED | OUTPATIENT
Start: 2021-11-10 | End: 2021-11-20

## 2021-11-10 RX ORDER — AMOXICILLIN 250 MG
1 CAPSULE ORAL
Qty: 10 TABLET | Refills: 0
Start: 2021-11-10 | End: 2021-12-11 | Stop reason: SDUPTHER

## 2021-11-10 RX ADMIN — MEMANTINE 5 MG: 5 TABLET ORAL at 17:21

## 2021-11-10 RX ADMIN — BISACODYL 5 MG: 5 TABLET, COATED ORAL at 13:01

## 2021-11-10 RX ADMIN — PRAVASTATIN SODIUM 20 MG: 20 TABLET ORAL at 17:21

## 2021-11-10 RX ADMIN — POLYETHYLENE GLYCOL 3350 17 G: 17 POWDER, FOR SOLUTION ORAL at 08:50

## 2021-11-10 RX ADMIN — POTASSIUM CHLORIDE 20 MEQ: 1500 TABLET, EXTENDED RELEASE ORAL at 08:51

## 2021-11-10 RX ADMIN — MEMANTINE 5 MG: 5 TABLET ORAL at 08:50

## 2021-11-10 RX ADMIN — OXYCODONE HYDROCHLORIDE AND ACETAMINOPHEN 500 MG: 500 TABLET ORAL at 08:50

## 2021-11-10 RX ADMIN — CITALOPRAM HYDROBROMIDE 20 MG: 20 TABLET ORAL at 08:50

## 2021-11-10 RX ADMIN — TAMSULOSIN HYDROCHLORIDE 0.4 MG: 0.4 CAPSULE ORAL at 17:21

## 2021-11-10 RX ADMIN — ASPIRIN 81 MG: 81 TABLET, CHEWABLE ORAL at 08:50

## 2021-11-10 RX ADMIN — ENOXAPARIN SODIUM 30 MG: 30 INJECTION SUBCUTANEOUS at 08:48

## 2021-11-10 RX ADMIN — OXYCODONE HYDROCHLORIDE 5 MG: 5 TABLET ORAL at 17:21

## 2021-11-12 ENCOUNTER — PATIENT OUTREACH (OUTPATIENT)
Dept: CASE MANAGEMENT | Facility: OTHER | Age: 76
End: 2021-11-12

## 2021-11-12 ENCOUNTER — TELEPHONE (OUTPATIENT)
Dept: OBGYN CLINIC | Facility: HOSPITAL | Age: 76
End: 2021-11-12

## 2021-11-15 ENCOUNTER — TRANSITIONAL CARE MANAGEMENT (OUTPATIENT)
Dept: FAMILY MEDICINE CLINIC | Facility: CLINIC | Age: 76
End: 2021-11-15

## 2021-11-18 ENCOUNTER — APPOINTMENT (OUTPATIENT)
Dept: RADIOLOGY | Facility: AMBULARY SURGERY CENTER | Age: 76
End: 2021-11-18
Attending: ORTHOPAEDIC SURGERY
Payer: COMMERCIAL

## 2021-11-18 ENCOUNTER — TELEPHONE (OUTPATIENT)
Dept: FAMILY MEDICINE CLINIC | Facility: CLINIC | Age: 76
End: 2021-11-18

## 2021-11-18 ENCOUNTER — OFFICE VISIT (OUTPATIENT)
Dept: OBGYN CLINIC | Facility: CLINIC | Age: 76
End: 2021-11-18

## 2021-11-18 DIAGNOSIS — Z87.81 STATUS POST CLOSED FRACTURE OF RIGHT FEMUR: Primary | ICD-10-CM

## 2021-11-18 DIAGNOSIS — Z87.81 STATUS POST CLOSED FRACTURE OF RIGHT FEMUR: ICD-10-CM

## 2021-11-18 PROCEDURE — 99024 POSTOP FOLLOW-UP VISIT: CPT | Performed by: ORTHOPAEDIC SURGERY

## 2021-11-18 PROCEDURE — 73552 X-RAY EXAM OF FEMUR 2/>: CPT

## 2021-11-19 ENCOUNTER — PATIENT OUTREACH (OUTPATIENT)
Dept: CASE MANAGEMENT | Facility: OTHER | Age: 76
End: 2021-11-19

## 2021-11-19 ENCOUNTER — TELEPHONE (OUTPATIENT)
Dept: FAMILY MEDICINE CLINIC | Facility: CLINIC | Age: 76
End: 2021-11-19

## 2021-11-26 ENCOUNTER — PATIENT OUTREACH (OUTPATIENT)
Dept: CASE MANAGEMENT | Facility: OTHER | Age: 76
End: 2021-11-26

## 2021-11-27 ENCOUNTER — APPOINTMENT (EMERGENCY)
Dept: RADIOLOGY | Facility: HOSPITAL | Age: 76
DRG: 682 | End: 2021-11-27
Payer: MEDICARE

## 2021-11-27 ENCOUNTER — HOSPITAL ENCOUNTER (INPATIENT)
Facility: HOSPITAL | Age: 76
LOS: 5 days | Discharge: HOME HEALTH | DRG: 682 | End: 2021-12-02
Admitting: INTERNAL MEDICINE
Payer: MEDICARE

## 2021-11-27 ENCOUNTER — APPOINTMENT (EMERGENCY)
Dept: CT IMAGING | Facility: HOSPITAL | Age: 76
DRG: 682 | End: 2021-11-27
Payer: MEDICARE

## 2021-11-27 DIAGNOSIS — E87.6 HYPOKALEMIA: ICD-10-CM

## 2021-11-27 DIAGNOSIS — G93.40 ENCEPHALOPATHY: ICD-10-CM

## 2021-11-27 DIAGNOSIS — E87.0 HYPERNATREMIA: Primary | ICD-10-CM

## 2021-11-27 DIAGNOSIS — E86.0 DEHYDRATION: ICD-10-CM

## 2021-11-27 DIAGNOSIS — L89.152 SACRAL DECUBITUS ULCER, STAGE II (HCC): ICD-10-CM

## 2021-11-27 DIAGNOSIS — S72.141A INTERTROCHANTERIC FRACTURE OF RIGHT FEMUR (HCC): ICD-10-CM

## 2021-11-27 DIAGNOSIS — G30.9 ALZHEIMER'S DISEASE (HCC): ICD-10-CM

## 2021-11-27 DIAGNOSIS — F02.80 ALZHEIMER'S DISEASE (HCC): ICD-10-CM

## 2021-11-27 DIAGNOSIS — N17.9 AKI (ACUTE KIDNEY INJURY) (HCC): ICD-10-CM

## 2021-11-27 LAB
ALBUMIN SERPL BCP-MCNC: 4.6 G/DL (ref 3.4–4.8)
ALP SERPL-CCNC: 210.6 U/L (ref 10–129)
ALT SERPL W P-5'-P-CCNC: 25 U/L (ref 5–63)
ANION GAP SERPL CALCULATED.3IONS-SCNC: 14 MMOL/L (ref 4–13)
APTT PPP: 38 SECONDS (ref 23–37)
AST SERPL W P-5'-P-CCNC: 20 U/L (ref 15–41)
BASOPHILS # BLD AUTO: 0.03 THOUSANDS/ΜL (ref 0–0.1)
BASOPHILS NFR BLD AUTO: 0 % (ref 0–1)
BILIRUB SERPL-MCNC: 0.71 MG/DL (ref 0.3–1.2)
BUN SERPL-MCNC: 62 MG/DL (ref 6–20)
CALCIUM SERPL-MCNC: 9.6 MG/DL (ref 8.4–10.2)
CHLORIDE SERPL-SCNC: 134 MMOL/L (ref 96–108)
CO2 SERPL-SCNC: 21 MMOL/L (ref 22–33)
CREAT SERPL-MCNC: 1.77 MG/DL (ref 0.5–1.2)
EOSINOPHIL # BLD AUTO: 0.21 THOUSAND/ΜL (ref 0–0.61)
EOSINOPHIL NFR BLD AUTO: 2 % (ref 0–6)
ERYTHROCYTE [DISTWIDTH] IN BLOOD BY AUTOMATED COUNT: 15.8 % (ref 11.6–15.1)
GFR SERPL CREATININE-BSD FRML MDRD: 36 ML/MIN/1.73SQ M
GLUCOSE SERPL-MCNC: 123 MG/DL (ref 65–140)
HCT VFR BLD AUTO: 47.8 % (ref 36.5–49.3)
HGB BLD-MCNC: 14.1 G/DL (ref 12–17)
IMM GRANULOCYTES # BLD AUTO: 0.02 THOUSAND/UL (ref 0–0.2)
IMM GRANULOCYTES NFR BLD AUTO: 0 % (ref 0–2)
INR PPP: 1.14 (ref 0.84–1.19)
LYMPHOCYTES # BLD AUTO: 2.87 THOUSANDS/ΜL (ref 0.6–4.47)
LYMPHOCYTES NFR BLD AUTO: 28 % (ref 14–44)
MCH RBC QN AUTO: 27.9 PG (ref 26.8–34.3)
MCHC RBC AUTO-ENTMCNC: 29.5 G/DL (ref 31.4–37.4)
MCV RBC AUTO: 95 FL (ref 82–98)
MONOCYTES # BLD AUTO: 0.67 THOUSAND/ΜL (ref 0.17–1.22)
MONOCYTES NFR BLD AUTO: 7 % (ref 4–12)
NEUTROPHILS # BLD AUTO: 6.43 THOUSANDS/ΜL (ref 1.85–7.62)
NEUTS SEG NFR BLD AUTO: 63 % (ref 43–75)
PLATELET # BLD AUTO: 407 THOUSANDS/UL (ref 149–390)
PMV BLD AUTO: 11.8 FL (ref 8.9–12.7)
POTASSIUM SERPL-SCNC: 3.8 MMOL/L (ref 3.5–5)
PROT SERPL-MCNC: 8.6 G/DL (ref 6.4–8.3)
PROTHROMBIN TIME: 14.5 SECONDS (ref 11.6–14.5)
RBC # BLD AUTO: 5.06 MILLION/UL (ref 3.88–5.62)
SODIUM SERPL-SCNC: 169 MMOL/L (ref 133–145)
WBC # BLD AUTO: 10.23 THOUSAND/UL (ref 4.31–10.16)

## 2021-11-27 PROCEDURE — 96365 THER/PROPH/DIAG IV INF INIT: CPT

## 2021-11-27 PROCEDURE — 1123F ACP DISCUSS/DSCN MKR DOCD: CPT

## 2021-11-27 PROCEDURE — 96375 TX/PRO/DX INJ NEW DRUG ADDON: CPT

## 2021-11-27 PROCEDURE — 71045 X-RAY EXAM CHEST 1 VIEW: CPT

## 2021-11-27 PROCEDURE — 93005 ELECTROCARDIOGRAM TRACING: CPT

## 2021-11-27 PROCEDURE — 85730 THROMBOPLASTIN TIME PARTIAL: CPT

## 2021-11-27 PROCEDURE — 87081 CULTURE SCREEN ONLY: CPT | Performed by: INTERNAL MEDICINE

## 2021-11-27 PROCEDURE — 99223 1ST HOSP IP/OBS HIGH 75: CPT | Performed by: INTERNAL MEDICINE

## 2021-11-27 PROCEDURE — 70450 CT HEAD/BRAIN W/O DYE: CPT

## 2021-11-27 PROCEDURE — 85610 PROTHROMBIN TIME: CPT

## 2021-11-27 PROCEDURE — 99285 EMERGENCY DEPT VISIT HI MDM: CPT

## 2021-11-27 PROCEDURE — 80053 COMPREHEN METABOLIC PANEL: CPT

## 2021-11-27 PROCEDURE — G1004 CDSM NDSC: HCPCS

## 2021-11-27 PROCEDURE — 36415 COLL VENOUS BLD VENIPUNCTURE: CPT

## 2021-11-27 PROCEDURE — 85025 COMPLETE CBC W/AUTO DIFF WBC: CPT

## 2021-11-27 RX ORDER — BUSPIRONE HYDROCHLORIDE 5 MG/1
5 TABLET ORAL 2 TIMES DAILY
COMMUNITY
End: 2021-12-02 | Stop reason: SDUPTHER

## 2021-11-27 RX ORDER — QUETIAPINE FUMARATE 25 MG/1
25 TABLET, FILM COATED ORAL 2 TIMES DAILY
COMMUNITY
End: 2021-12-02 | Stop reason: HOSPADM

## 2021-11-27 RX ORDER — LORAZEPAM 2 MG/ML
0.5 INJECTION INTRAMUSCULAR ONCE
Status: COMPLETED | OUTPATIENT
Start: 2021-11-27 | End: 2021-11-27

## 2021-11-27 RX ORDER — HEPARIN SODIUM 5000 [USP'U]/ML
5000 INJECTION, SOLUTION INTRAVENOUS; SUBCUTANEOUS EVERY 8 HOURS SCHEDULED
Status: DISCONTINUED | OUTPATIENT
Start: 2021-11-27 | End: 2021-11-28

## 2021-11-27 RX ORDER — QUETIAPINE FUMARATE 50 MG/1
50 TABLET, FILM COATED ORAL 2 TIMES DAILY
COMMUNITY
End: 2021-12-02 | Stop reason: HOSPADM

## 2021-11-27 RX ORDER — DEXTROSE MONOHYDRATE 50 MG/ML
75 INJECTION, SOLUTION INTRAVENOUS CONTINUOUS
Status: DISCONTINUED | OUTPATIENT
Start: 2021-11-27 | End: 2021-12-01

## 2021-11-27 RX ORDER — ACETAMINOPHEN 325 MG/1
650 TABLET ORAL EVERY 6 HOURS PRN
Status: DISCONTINUED | OUTPATIENT
Start: 2021-11-27 | End: 2021-12-02 | Stop reason: HOSPADM

## 2021-11-27 RX ADMIN — HEPARIN SODIUM 5000 UNITS: 5000 INJECTION INTRAVENOUS; SUBCUTANEOUS at 21:52

## 2021-11-27 RX ADMIN — DEXTROSE 125 ML/HR: 5 SOLUTION INTRAVENOUS at 18:40

## 2021-11-27 RX ADMIN — LORAZEPAM 0.5 MG: 2 INJECTION INTRAMUSCULAR; INTRAVENOUS at 17:25

## 2021-11-27 RX ADMIN — SODIUM CHLORIDE, SODIUM LACTATE, POTASSIUM CHLORIDE, AND CALCIUM CHLORIDE 500 ML: .6; .31; .03; .02 INJECTION, SOLUTION INTRAVENOUS at 17:55

## 2021-11-27 RX ADMIN — DEXTROSE 1000 ML: 5 SOLUTION INTRAVENOUS at 18:36

## 2021-11-28 PROBLEM — L89.152 SACRAL DECUBITUS ULCER, STAGE II (HCC): Status: ACTIVE | Noted: 2021-11-28

## 2021-11-28 LAB
ANION GAP SERPL CALCULATED.3IONS-SCNC: 10 MMOL/L (ref 4–13)
ANION GAP SERPL CALCULATED.3IONS-SCNC: 11 MMOL/L (ref 4–13)
ANION GAP SERPL CALCULATED.3IONS-SCNC: 11 MMOL/L (ref 4–13)
ANION GAP SERPL CALCULATED.3IONS-SCNC: 13 MMOL/L (ref 4–13)
BUN SERPL-MCNC: 40 MG/DL (ref 6–20)
BUN SERPL-MCNC: 43 MG/DL (ref 6–20)
BUN SERPL-MCNC: 55 MG/DL (ref 6–20)
BUN SERPL-MCNC: 58 MG/DL (ref 6–20)
CALCIUM SERPL-MCNC: 8.3 MG/DL (ref 8.4–10.2)
CALCIUM SERPL-MCNC: 8.6 MG/DL (ref 8.4–10.2)
CALCIUM SERPL-MCNC: 8.9 MG/DL (ref 8.4–10.2)
CALCIUM SERPL-MCNC: 9 MG/DL (ref 8.4–10.2)
CHLORIDE SERPL-SCNC: 122 MMOL/L (ref 96–108)
CHLORIDE SERPL-SCNC: 124 MMOL/L (ref 96–108)
CHLORIDE SERPL-SCNC: 131 MMOL/L (ref 96–108)
CHLORIDE SERPL-SCNC: 132 MMOL/L (ref 96–108)
CO2 SERPL-SCNC: 18 MMOL/L (ref 22–33)
CO2 SERPL-SCNC: 18 MMOL/L (ref 22–33)
CO2 SERPL-SCNC: 19 MMOL/L (ref 22–33)
CO2 SERPL-SCNC: 19 MMOL/L (ref 22–33)
CREAT SERPL-MCNC: 1.12 MG/DL (ref 0.5–1.2)
CREAT SERPL-MCNC: 1.21 MG/DL (ref 0.5–1.2)
CREAT SERPL-MCNC: 1.33 MG/DL (ref 0.5–1.2)
CREAT SERPL-MCNC: 1.47 MG/DL (ref 0.5–1.2)
ERYTHROCYTE [DISTWIDTH] IN BLOOD BY AUTOMATED COUNT: 15.5 % (ref 11.6–15.1)
GFR SERPL CREATININE-BSD FRML MDRD: 45 ML/MIN/1.73SQ M
GFR SERPL CREATININE-BSD FRML MDRD: 51 ML/MIN/1.73SQ M
GFR SERPL CREATININE-BSD FRML MDRD: 57 ML/MIN/1.73SQ M
GFR SERPL CREATININE-BSD FRML MDRD: 63 ML/MIN/1.73SQ M
GLUCOSE SERPL-MCNC: 133 MG/DL (ref 65–140)
GLUCOSE SERPL-MCNC: 147 MG/DL (ref 65–140)
GLUCOSE SERPL-MCNC: 228 MG/DL (ref 65–140)
GLUCOSE SERPL-MCNC: 93 MG/DL (ref 65–140)
HCT VFR BLD AUTO: 41.5 % (ref 36.5–49.3)
HGB BLD-MCNC: 12.5 G/DL (ref 12–17)
MCH RBC QN AUTO: 28.3 PG (ref 26.8–34.3)
MCHC RBC AUTO-ENTMCNC: 30.1 G/DL (ref 31.4–37.4)
MCV RBC AUTO: 94 FL (ref 82–98)
PLATELET # BLD AUTO: 294 THOUSANDS/UL (ref 149–390)
PMV BLD AUTO: 11.8 FL (ref 8.9–12.7)
POTASSIUM SERPL-SCNC: 3.3 MMOL/L (ref 3.5–5)
POTASSIUM SERPL-SCNC: 3.3 MMOL/L (ref 3.5–5)
POTASSIUM SERPL-SCNC: 3.6 MMOL/L (ref 3.5–5)
POTASSIUM SERPL-SCNC: 3.9 MMOL/L (ref 3.5–5)
RBC # BLD AUTO: 4.42 MILLION/UL (ref 3.88–5.62)
SODIUM SERPL-SCNC: 150 MMOL/L (ref 133–145)
SODIUM SERPL-SCNC: 154 MMOL/L (ref 133–145)
SODIUM SERPL-SCNC: 161 MMOL/L (ref 133–145)
SODIUM SERPL-SCNC: 163 MMOL/L (ref 133–145)
WBC # BLD AUTO: 8.31 THOUSAND/UL (ref 4.31–10.16)

## 2021-11-28 PROCEDURE — 80048 BASIC METABOLIC PNL TOTAL CA: CPT | Performed by: INTERNAL MEDICINE

## 2021-11-28 PROCEDURE — 99233 SBSQ HOSP IP/OBS HIGH 50: CPT | Performed by: INTERNAL MEDICINE

## 2021-11-28 PROCEDURE — 85027 COMPLETE CBC AUTOMATED: CPT | Performed by: INTERNAL MEDICINE

## 2021-11-28 PROCEDURE — 92610 EVALUATE SWALLOWING FUNCTION: CPT

## 2021-11-28 RX ORDER — AMOXICILLIN 250 MG
1 CAPSULE ORAL
Status: DISCONTINUED | OUTPATIENT
Start: 2021-11-28 | End: 2021-12-02 | Stop reason: HOSPADM

## 2021-11-28 RX ORDER — BUSPIRONE HYDROCHLORIDE 5 MG/1
5 TABLET ORAL 2 TIMES DAILY
Status: DISCONTINUED | OUTPATIENT
Start: 2021-11-28 | End: 2021-12-02 | Stop reason: HOSPADM

## 2021-11-28 RX ORDER — HALOPERIDOL 5 MG/ML
1 INJECTION INTRAMUSCULAR EVERY 6 HOURS PRN
Status: DISCONTINUED | OUTPATIENT
Start: 2021-11-28 | End: 2021-11-29

## 2021-11-28 RX ORDER — TAMSULOSIN HYDROCHLORIDE 0.4 MG/1
0.4 CAPSULE ORAL DAILY
Status: DISCONTINUED | OUTPATIENT
Start: 2021-11-28 | End: 2021-12-02 | Stop reason: HOSPADM

## 2021-11-28 RX ORDER — POTASSIUM CHLORIDE 20 MEQ/1
40 TABLET, EXTENDED RELEASE ORAL ONCE
Status: DISCONTINUED | OUTPATIENT
Start: 2021-11-28 | End: 2021-11-28

## 2021-11-28 RX ORDER — AMOXICILLIN 250 MG
CAPSULE ORAL
Status: COMPLETED
Start: 2021-11-28 | End: 2021-11-28

## 2021-11-28 RX ORDER — PRAVASTATIN SODIUM 20 MG
20 TABLET ORAL
Status: DISCONTINUED | OUTPATIENT
Start: 2021-11-28 | End: 2021-12-02 | Stop reason: HOSPADM

## 2021-11-28 RX ORDER — ASCORBIC ACID 500 MG
500 TABLET ORAL DAILY
Status: DISCONTINUED | OUTPATIENT
Start: 2021-11-28 | End: 2021-12-02 | Stop reason: HOSPADM

## 2021-11-28 RX ORDER — ASPIRIN 81 MG/1
81 TABLET, CHEWABLE ORAL DAILY
Status: DISCONTINUED | OUTPATIENT
Start: 2021-11-28 | End: 2021-12-02 | Stop reason: HOSPADM

## 2021-11-28 RX ORDER — QUETIAPINE FUMARATE 25 MG/1
25 TABLET, FILM COATED ORAL 2 TIMES DAILY
Status: DISCONTINUED | OUTPATIENT
Start: 2021-11-28 | End: 2021-11-30

## 2021-11-28 RX ORDER — DONEPEZIL HYDROCHLORIDE 5 MG/1
10 TABLET, FILM COATED ORAL DAILY
Status: DISCONTINUED | OUTPATIENT
Start: 2021-11-28 | End: 2021-12-02 | Stop reason: HOSPADM

## 2021-11-28 RX ORDER — POTASSIUM CHLORIDE 14.9 MG/ML
20 INJECTION INTRAVENOUS
Status: COMPLETED | OUTPATIENT
Start: 2021-11-28 | End: 2021-11-28

## 2021-11-28 RX ADMIN — BUSPIRONE HYDROCHLORIDE 5 MG: 5 TABLET ORAL at 20:19

## 2021-11-28 RX ADMIN — HEPARIN SODIUM 5000 UNITS: 5000 INJECTION INTRAVENOUS; SUBCUTANEOUS at 05:21

## 2021-11-28 RX ADMIN — DOCUSATE SODIUM AND SENNOSIDES 1 TABLET: 8.6; 5 TABLET ORAL at 20:21

## 2021-11-28 RX ADMIN — OXYCODONE HYDROCHLORIDE AND ACETAMINOPHEN 500 MG: 500 TABLET ORAL at 16:04

## 2021-11-28 RX ADMIN — ENOXAPARIN SODIUM 30 MG: 30 INJECTION SUBCUTANEOUS at 20:20

## 2021-11-28 RX ADMIN — DEXTROSE 125 ML/HR: 5 SOLUTION INTRAVENOUS at 06:54

## 2021-11-28 RX ADMIN — POTASSIUM CHLORIDE 20 MEQ: 14.9 INJECTION, SOLUTION INTRAVENOUS at 03:12

## 2021-11-28 RX ADMIN — DEXTROSE 125 ML/HR: 5 SOLUTION INTRAVENOUS at 15:19

## 2021-11-28 RX ADMIN — PRAVASTATIN SODIUM 20 MG: 20 TABLET ORAL at 16:04

## 2021-11-28 RX ADMIN — QUETIAPINE FUMARATE 25 MG: 25 TABLET ORAL at 18:15

## 2021-11-28 RX ADMIN — ENOXAPARIN SODIUM 30 MG: 30 INJECTION SUBCUTANEOUS at 13:23

## 2021-11-28 RX ADMIN — ACETAMINOPHEN 650 MG: 325 TABLET, FILM COATED ORAL at 20:18

## 2021-11-28 RX ADMIN — POTASSIUM CHLORIDE 20 MEQ: 14.9 INJECTION, SOLUTION INTRAVENOUS at 05:21

## 2021-11-28 RX ADMIN — DONEPEZIL HYDROCHLORIDE 10 MG: 5 TABLET ORAL at 16:04

## 2021-11-28 RX ADMIN — HALOPERIDOL LACTATE 1 MG: 5 INJECTION, SOLUTION INTRAMUSCULAR at 20:19

## 2021-11-28 RX ADMIN — ASPIRIN 81 MG: 81 TABLET, CHEWABLE ORAL at 16:04

## 2021-11-28 RX ADMIN — TAMSULOSIN HYDROCHLORIDE 0.4 MG: 0.4 CAPSULE ORAL at 16:04

## 2021-11-29 ENCOUNTER — PATIENT OUTREACH (OUTPATIENT)
Dept: CASE MANAGEMENT | Facility: OTHER | Age: 76
End: 2021-11-29

## 2021-11-29 ENCOUNTER — PATIENT OUTREACH (OUTPATIENT)
Dept: CASE MANAGEMENT | Facility: HOSPITAL | Age: 76
End: 2021-11-29

## 2021-11-29 PROBLEM — E44.0 MODERATE PROTEIN-CALORIE MALNUTRITION (HCC): Status: ACTIVE | Noted: 2021-11-29

## 2021-11-29 LAB
AMPHETAMINES SERPL QL SCN: NEGATIVE
ANION GAP SERPL CALCULATED.3IONS-SCNC: 8 MMOL/L (ref 4–13)
BARBITURATES UR QL: NEGATIVE
BENZODIAZ UR QL: NEGATIVE
BILIRUB UR QL STRIP: NEGATIVE
BUN SERPL-MCNC: 34 MG/DL (ref 6–20)
CALCIUM SERPL-MCNC: 8.2 MG/DL (ref 8.4–10.2)
CHLORIDE SERPL-SCNC: 121 MMOL/L (ref 96–108)
CLARITY UR: CLEAR
CO2 SERPL-SCNC: 20 MMOL/L (ref 22–33)
COCAINE UR QL: NEGATIVE
COLOR UR: YELLOW
CREAT SERPL-MCNC: 0.88 MG/DL (ref 0.5–1.2)
ERYTHROCYTE [DISTWIDTH] IN BLOOD BY AUTOMATED COUNT: 14.6 % (ref 11.6–15.1)
GFR SERPL CREATININE-BSD FRML MDRD: 83 ML/MIN/1.73SQ M
GLUCOSE SERPL-MCNC: 113 MG/DL (ref 65–140)
GLUCOSE UR STRIP-MCNC: NEGATIVE MG/DL
HCT VFR BLD AUTO: 33.9 % (ref 36.5–49.3)
HGB BLD-MCNC: 10.2 G/DL (ref 12–17)
HGB UR QL STRIP.AUTO: NEGATIVE
KETONES UR STRIP-MCNC: NEGATIVE MG/DL
LEUKOCYTE ESTERASE UR QL STRIP: NEGATIVE
MAGNESIUM SERPL-MCNC: 2.1 MG/DL (ref 1.6–2.6)
MCH RBC QN AUTO: 28 PG (ref 26.8–34.3)
MCHC RBC AUTO-ENTMCNC: 30.1 G/DL (ref 31.4–37.4)
MCV RBC AUTO: 93 FL (ref 82–98)
METHADONE UR QL: NEGATIVE
MRSA NOSE QL CULT: NORMAL
NITRITE UR QL STRIP: NEGATIVE
OPIATES UR QL SCN: NEGATIVE
OXYCODONE+OXYMORPHONE UR QL SCN: NEGATIVE
PCP UR QL: NEGATIVE
PH UR STRIP.AUTO: 5.5 [PH]
PLATELET # BLD AUTO: 202 THOUSANDS/UL (ref 149–390)
PMV BLD AUTO: 11.5 FL (ref 8.9–12.7)
POTASSIUM SERPL-SCNC: 3.2 MMOL/L (ref 3.5–5)
PROT UR STRIP-MCNC: NEGATIVE MG/DL
RBC # BLD AUTO: 3.64 MILLION/UL (ref 3.88–5.62)
SODIUM SERPL-SCNC: 149 MMOL/L (ref 133–145)
SP GR UR STRIP.AUTO: >=1.03 (ref 1–1.03)
THC UR QL: NEGATIVE
UROBILINOGEN UR QL STRIP.AUTO: 1 E.U./DL
WBC # BLD AUTO: 7.18 THOUSAND/UL (ref 4.31–10.16)

## 2021-11-29 PROCEDURE — 85027 COMPLETE CBC AUTOMATED: CPT | Performed by: INTERNAL MEDICINE

## 2021-11-29 PROCEDURE — 97163 PT EVAL HIGH COMPLEX 45 MIN: CPT

## 2021-11-29 PROCEDURE — 83735 ASSAY OF MAGNESIUM: CPT | Performed by: INTERNAL MEDICINE

## 2021-11-29 PROCEDURE — 99232 SBSQ HOSP IP/OBS MODERATE 35: CPT | Performed by: INTERNAL MEDICINE

## 2021-11-29 PROCEDURE — 92526 ORAL FUNCTION THERAPY: CPT

## 2021-11-29 PROCEDURE — 81003 URINALYSIS AUTO W/O SCOPE: CPT

## 2021-11-29 PROCEDURE — 80048 BASIC METABOLIC PNL TOTAL CA: CPT | Performed by: INTERNAL MEDICINE

## 2021-11-29 PROCEDURE — 80307 DRUG TEST PRSMV CHEM ANLYZR: CPT | Performed by: INTERNAL MEDICINE

## 2021-11-29 PROCEDURE — 97167 OT EVAL HIGH COMPLEX 60 MIN: CPT

## 2021-11-29 RX ORDER — HALOPERIDOL 5 MG/ML
2 INJECTION INTRAMUSCULAR EVERY 6 HOURS PRN
Status: DISCONTINUED | OUTPATIENT
Start: 2021-11-29 | End: 2021-11-30

## 2021-11-29 RX ORDER — ESCITALOPRAM OXALATE 10 MG/1
5 TABLET ORAL DAILY
Status: DISCONTINUED | OUTPATIENT
Start: 2021-11-29 | End: 2021-12-02 | Stop reason: HOSPADM

## 2021-11-29 RX ORDER — POTASSIUM CHLORIDE 14.9 MG/ML
20 INJECTION INTRAVENOUS
Status: COMPLETED | OUTPATIENT
Start: 2021-11-29 | End: 2021-11-29

## 2021-11-29 RX ORDER — POTASSIUM CHLORIDE 29.8 MG/ML
40 INJECTION INTRAVENOUS ONCE
Status: DISCONTINUED | OUTPATIENT
Start: 2021-11-29 | End: 2021-11-29

## 2021-11-29 RX ADMIN — TAMSULOSIN HYDROCHLORIDE 0.4 MG: 0.4 CAPSULE ORAL at 12:46

## 2021-11-29 RX ADMIN — ASPIRIN 81 MG: 81 TABLET, CHEWABLE ORAL at 12:46

## 2021-11-29 RX ADMIN — POTASSIUM CHLORIDE 20 MEQ: 14.9 INJECTION, SOLUTION INTRAVENOUS at 12:36

## 2021-11-29 RX ADMIN — OXYCODONE HYDROCHLORIDE AND ACETAMINOPHEN 500 MG: 500 TABLET ORAL at 12:46

## 2021-11-29 RX ADMIN — DOCUSATE SODIUM AND SENNOSIDES 1 TABLET: 8.6; 5 TABLET ORAL at 20:16

## 2021-11-29 RX ADMIN — DONEPEZIL HYDROCHLORIDE 10 MG: 5 TABLET ORAL at 12:46

## 2021-11-29 RX ADMIN — QUETIAPINE FUMARATE 25 MG: 25 TABLET ORAL at 17:01

## 2021-11-29 RX ADMIN — BUSPIRONE HYDROCHLORIDE 5 MG: 5 TABLET ORAL at 12:46

## 2021-11-29 RX ADMIN — ENOXAPARIN SODIUM 30 MG: 30 INJECTION SUBCUTANEOUS at 12:44

## 2021-11-29 RX ADMIN — DEXTROSE 100 ML/HR: 5 SOLUTION INTRAVENOUS at 01:21

## 2021-11-29 RX ADMIN — ACETAMINOPHEN 650 MG: 325 TABLET, FILM COATED ORAL at 20:15

## 2021-11-29 RX ADMIN — PRAVASTATIN SODIUM 20 MG: 20 TABLET ORAL at 16:42

## 2021-11-29 RX ADMIN — POTASSIUM CHLORIDE 20 MEQ: 14.9 INJECTION, SOLUTION INTRAVENOUS at 14:42

## 2021-11-29 RX ADMIN — ESCITALOPRAM OXALATE 5 MG: 10 TABLET ORAL at 12:53

## 2021-11-29 RX ADMIN — QUETIAPINE FUMARATE 25 MG: 25 TABLET ORAL at 12:45

## 2021-11-29 RX ADMIN — BUSPIRONE HYDROCHLORIDE 5 MG: 5 TABLET ORAL at 20:15

## 2021-11-29 RX ADMIN — HALOPERIDOL LACTATE 1 MG: 5 INJECTION, SOLUTION INTRAMUSCULAR at 05:22

## 2021-11-30 ENCOUNTER — PATIENT OUTREACH (OUTPATIENT)
Dept: CASE MANAGEMENT | Facility: HOSPITAL | Age: 76
End: 2021-11-30

## 2021-11-30 LAB
ANION GAP SERPL CALCULATED.3IONS-SCNC: 7 MMOL/L (ref 4–13)
ATRIAL RATE: 103 BPM
BASOPHILS # BLD AUTO: 0.01 THOUSANDS/ΜL (ref 0–0.1)
BASOPHILS NFR BLD AUTO: 0 % (ref 0–1)
BUN SERPL-MCNC: 19 MG/DL (ref 6–20)
CALCIUM SERPL-MCNC: 8.6 MG/DL (ref 8.4–10.2)
CHLORIDE SERPL-SCNC: 115 MMOL/L (ref 96–108)
CO2 SERPL-SCNC: 23 MMOL/L (ref 22–33)
CREAT SERPL-MCNC: 0.86 MG/DL (ref 0.5–1.2)
EOSINOPHIL # BLD AUTO: 0.32 THOUSAND/ΜL (ref 0–0.61)
EOSINOPHIL NFR BLD AUTO: 5 % (ref 0–6)
ERYTHROCYTE [DISTWIDTH] IN BLOOD BY AUTOMATED COUNT: 14.5 % (ref 11.6–15.1)
GFR SERPL CREATININE-BSD FRML MDRD: 84 ML/MIN/1.73SQ M
GLUCOSE SERPL-MCNC: 96 MG/DL (ref 65–140)
HCT VFR BLD AUTO: 34.7 % (ref 36.5–49.3)
HGB BLD-MCNC: 10.7 G/DL (ref 12–17)
IMM GRANULOCYTES # BLD AUTO: 0.02 THOUSAND/UL (ref 0–0.2)
IMM GRANULOCYTES NFR BLD AUTO: 0 % (ref 0–2)
LYMPHOCYTES # BLD AUTO: 1.52 THOUSANDS/ΜL (ref 0.6–4.47)
LYMPHOCYTES NFR BLD AUTO: 25 % (ref 14–44)
MAGNESIUM SERPL-MCNC: 2 MG/DL (ref 1.6–2.6)
MCH RBC QN AUTO: 28.3 PG (ref 26.8–34.3)
MCHC RBC AUTO-ENTMCNC: 30.8 G/DL (ref 31.4–37.4)
MCV RBC AUTO: 92 FL (ref 82–98)
MONOCYTES # BLD AUTO: 0.4 THOUSAND/ΜL (ref 0.17–1.22)
MONOCYTES NFR BLD AUTO: 7 % (ref 4–12)
NEUTROPHILS # BLD AUTO: 3.77 THOUSANDS/ΜL (ref 1.85–7.62)
NEUTS SEG NFR BLD AUTO: 63 % (ref 43–75)
P AXIS: 74 DEGREES
PLATELET # BLD AUTO: 194 THOUSANDS/UL (ref 149–390)
PMV BLD AUTO: 11.5 FL (ref 8.9–12.7)
POTASSIUM SERPL-SCNC: 3.8 MMOL/L (ref 3.5–5)
PR INTERVAL: 168 MS
QRS AXIS: 44 DEGREES
QRSD INTERVAL: 88 MS
QT INTERVAL: 362 MS
QTC INTERVAL: 474 MS
RBC # BLD AUTO: 3.78 MILLION/UL (ref 3.88–5.62)
SODIUM SERPL-SCNC: 145 MMOL/L (ref 133–145)
VENTRICULAR RATE: 103 BPM
WBC # BLD AUTO: 6.04 THOUSAND/UL (ref 4.31–10.16)

## 2021-11-30 PROCEDURE — 83735 ASSAY OF MAGNESIUM: CPT | Performed by: PHYSICIAN ASSISTANT

## 2021-11-30 PROCEDURE — 93010 ELECTROCARDIOGRAM REPORT: CPT | Performed by: INTERNAL MEDICINE

## 2021-11-30 PROCEDURE — 85025 COMPLETE CBC W/AUTO DIFF WBC: CPT | Performed by: PHYSICIAN ASSISTANT

## 2021-11-30 PROCEDURE — 99232 SBSQ HOSP IP/OBS MODERATE 35: CPT | Performed by: PHYSICIAN ASSISTANT

## 2021-11-30 PROCEDURE — 80048 BASIC METABOLIC PNL TOTAL CA: CPT | Performed by: PHYSICIAN ASSISTANT

## 2021-11-30 RX ORDER — QUETIAPINE FUMARATE 25 MG/1
25 TABLET, FILM COATED ORAL
Status: DISCONTINUED | OUTPATIENT
Start: 2021-11-30 | End: 2021-12-02 | Stop reason: HOSPADM

## 2021-11-30 RX ADMIN — BUSPIRONE HYDROCHLORIDE 5 MG: 5 TABLET ORAL at 21:33

## 2021-11-30 RX ADMIN — OXYCODONE HYDROCHLORIDE AND ACETAMINOPHEN 500 MG: 500 TABLET ORAL at 09:20

## 2021-11-30 RX ADMIN — BUSPIRONE HYDROCHLORIDE 5 MG: 5 TABLET ORAL at 09:20

## 2021-11-30 RX ADMIN — QUETIAPINE FUMARATE 25 MG: 25 TABLET ORAL at 09:20

## 2021-11-30 RX ADMIN — DEXTROSE 75 ML/HR: 5 SOLUTION INTRAVENOUS at 23:42

## 2021-11-30 RX ADMIN — DEXTROSE 75 ML/HR: 5 SOLUTION INTRAVENOUS at 03:44

## 2021-11-30 RX ADMIN — ASPIRIN 81 MG: 81 TABLET, CHEWABLE ORAL at 09:20

## 2021-11-30 RX ADMIN — DOCUSATE SODIUM AND SENNOSIDES 1 TABLET: 8.6; 5 TABLET ORAL at 21:33

## 2021-11-30 RX ADMIN — TAMSULOSIN HYDROCHLORIDE 0.4 MG: 0.4 CAPSULE ORAL at 09:20

## 2021-11-30 RX ADMIN — ESCITALOPRAM OXALATE 5 MG: 10 TABLET ORAL at 09:20

## 2021-11-30 RX ADMIN — DONEPEZIL HYDROCHLORIDE 10 MG: 5 TABLET ORAL at 09:20

## 2021-11-30 RX ADMIN — ENOXAPARIN SODIUM 30 MG: 30 INJECTION SUBCUTANEOUS at 09:21

## 2021-11-30 RX ADMIN — PRAVASTATIN SODIUM 20 MG: 20 TABLET ORAL at 16:46

## 2021-11-30 RX ADMIN — ENOXAPARIN SODIUM 30 MG: 30 INJECTION SUBCUTANEOUS at 21:33

## 2021-11-30 RX ADMIN — QUETIAPINE FUMARATE 25 MG: 25 TABLET ORAL at 21:33

## 2021-12-01 PROBLEM — E87.6 HYPOKALEMIA: Status: ACTIVE | Noted: 2021-12-01

## 2021-12-01 LAB
ANION GAP SERPL CALCULATED.3IONS-SCNC: 7 MMOL/L (ref 4–13)
ANION GAP SERPL CALCULATED.3IONS-SCNC: 9 MMOL/L (ref 4–13)
BASOPHILS # BLD AUTO: 0.01 THOUSANDS/ΜL (ref 0–0.1)
BASOPHILS NFR BLD AUTO: 0 % (ref 0–1)
BUN SERPL-MCNC: 19 MG/DL (ref 6–20)
BUN SERPL-MCNC: 20 MG/DL (ref 6–20)
CALCIUM SERPL-MCNC: 8.5 MG/DL (ref 8.4–10.2)
CALCIUM SERPL-MCNC: 8.6 MG/DL (ref 8.4–10.2)
CHLORIDE SERPL-SCNC: 115 MMOL/L (ref 96–108)
CHLORIDE SERPL-SCNC: 116 MMOL/L (ref 96–108)
CO2 SERPL-SCNC: 20 MMOL/L (ref 22–33)
CO2 SERPL-SCNC: 21 MMOL/L (ref 22–33)
CREAT SERPL-MCNC: 0.76 MG/DL (ref 0.5–1.2)
CREAT SERPL-MCNC: 0.8 MG/DL (ref 0.5–1.2)
DME PARACHUTE DELIVERY DATE ACTUAL: NORMAL
DME PARACHUTE DELIVERY DATE ACTUAL: NORMAL
DME PARACHUTE DELIVERY DATE EXPECTED: NORMAL
DME PARACHUTE DELIVERY DATE EXPECTED: NORMAL
DME PARACHUTE DELIVERY DATE REQUESTED: NORMAL
DME PARACHUTE DELIVERY DATE REQUESTED: NORMAL
DME PARACHUTE ITEM DESCRIPTION: NORMAL
DME PARACHUTE ORDER STATUS: NORMAL
DME PARACHUTE ORDER STATUS: NORMAL
DME PARACHUTE SUPPLIER NAME: NORMAL
DME PARACHUTE SUPPLIER NAME: NORMAL
DME PARACHUTE SUPPLIER PHONE: NORMAL
DME PARACHUTE SUPPLIER PHONE: NORMAL
EOSINOPHIL # BLD AUTO: 0.23 THOUSAND/ΜL (ref 0–0.61)
EOSINOPHIL NFR BLD AUTO: 3 % (ref 0–6)
ERYTHROCYTE [DISTWIDTH] IN BLOOD BY AUTOMATED COUNT: 13.9 % (ref 11.6–15.1)
GFR SERPL CREATININE-BSD FRML MDRD: 86 ML/MIN/1.73SQ M
GFR SERPL CREATININE-BSD FRML MDRD: 88 ML/MIN/1.73SQ M
GLUCOSE SERPL-MCNC: 118 MG/DL (ref 65–140)
GLUCOSE SERPL-MCNC: 96 MG/DL (ref 65–140)
HCT VFR BLD AUTO: 33 % (ref 36.5–49.3)
HGB BLD-MCNC: 10.2 G/DL (ref 12–17)
IMM GRANULOCYTES # BLD AUTO: 0.02 THOUSAND/UL (ref 0–0.2)
IMM GRANULOCYTES NFR BLD AUTO: 0 % (ref 0–2)
LYMPHOCYTES # BLD AUTO: 1.57 THOUSANDS/ΜL (ref 0.6–4.47)
LYMPHOCYTES NFR BLD AUTO: 22 % (ref 14–44)
MAGNESIUM SERPL-MCNC: 2 MG/DL (ref 1.6–2.6)
MCH RBC QN AUTO: 28.1 PG (ref 26.8–34.3)
MCHC RBC AUTO-ENTMCNC: 30.9 G/DL (ref 31.4–37.4)
MCV RBC AUTO: 91 FL (ref 82–98)
MONOCYTES # BLD AUTO: 0.41 THOUSAND/ΜL (ref 0.17–1.22)
MONOCYTES NFR BLD AUTO: 6 % (ref 4–12)
NEUTROPHILS # BLD AUTO: 4.85 THOUSANDS/ΜL (ref 1.85–7.62)
NEUTS SEG NFR BLD AUTO: 69 % (ref 43–75)
PLATELET # BLD AUTO: 174 THOUSANDS/UL (ref 149–390)
PMV BLD AUTO: 12.1 FL (ref 8.9–12.7)
POTASSIUM SERPL-SCNC: 3 MMOL/L (ref 3.5–5)
POTASSIUM SERPL-SCNC: 4.1 MMOL/L (ref 3.5–5)
RBC # BLD AUTO: 3.63 MILLION/UL (ref 3.88–5.62)
SODIUM SERPL-SCNC: 143 MMOL/L (ref 133–145)
SODIUM SERPL-SCNC: 145 MMOL/L (ref 133–145)
WBC # BLD AUTO: 7.09 THOUSAND/UL (ref 4.31–10.16)

## 2021-12-01 PROCEDURE — 97530 THERAPEUTIC ACTIVITIES: CPT

## 2021-12-01 PROCEDURE — 83735 ASSAY OF MAGNESIUM: CPT | Performed by: PHYSICIAN ASSISTANT

## 2021-12-01 PROCEDURE — 80048 BASIC METABOLIC PNL TOTAL CA: CPT | Performed by: PHYSICIAN ASSISTANT

## 2021-12-01 PROCEDURE — 99232 SBSQ HOSP IP/OBS MODERATE 35: CPT | Performed by: PHYSICIAN ASSISTANT

## 2021-12-01 PROCEDURE — 85025 COMPLETE CBC W/AUTO DIFF WBC: CPT | Performed by: PHYSICIAN ASSISTANT

## 2021-12-01 RX ORDER — ESCITALOPRAM OXALATE 5 MG/1
5 TABLET ORAL DAILY
Qty: 30 TABLET | Refills: 0 | Status: SHIPPED | OUTPATIENT
Start: 2021-12-02 | End: 2021-12-11 | Stop reason: SDUPTHER

## 2021-12-01 RX ORDER — POTASSIUM CHLORIDE 20 MEQ/1
40 TABLET, EXTENDED RELEASE ORAL ONCE
Status: DISCONTINUED | OUTPATIENT
Start: 2021-12-01 | End: 2021-12-01

## 2021-12-01 RX ORDER — POTASSIUM CHLORIDE 20MEQ/15ML
40 LIQUID (ML) ORAL ONCE
Status: COMPLETED | OUTPATIENT
Start: 2021-12-01 | End: 2021-12-01

## 2021-12-01 RX ORDER — POTASSIUM CHLORIDE 20MEQ/15ML
20 LIQUID (ML) ORAL DAILY
Qty: 450 ML | Refills: 0 | Status: SHIPPED | OUTPATIENT
Start: 2021-12-01 | End: 2021-12-02 | Stop reason: HOSPADM

## 2021-12-01 RX ORDER — QUETIAPINE FUMARATE 25 MG/1
25 TABLET, FILM COATED ORAL
Qty: 30 TABLET | Refills: 0 | Status: SHIPPED | OUTPATIENT
Start: 2021-12-01 | End: 2021-12-11 | Stop reason: SDUPTHER

## 2021-12-01 RX ADMIN — DONEPEZIL HYDROCHLORIDE 10 MG: 5 TABLET ORAL at 08:36

## 2021-12-01 RX ADMIN — OXYCODONE HYDROCHLORIDE AND ACETAMINOPHEN 500 MG: 500 TABLET ORAL at 08:35

## 2021-12-01 RX ADMIN — ENOXAPARIN SODIUM 30 MG: 30 INJECTION SUBCUTANEOUS at 21:30

## 2021-12-01 RX ADMIN — PRAVASTATIN SODIUM 20 MG: 20 TABLET ORAL at 17:48

## 2021-12-01 RX ADMIN — POTASSIUM CHLORIDE 40 MEQ: 20 SOLUTION ORAL at 11:00

## 2021-12-01 RX ADMIN — TAMSULOSIN HYDROCHLORIDE 0.4 MG: 0.4 CAPSULE ORAL at 08:36

## 2021-12-01 RX ADMIN — QUETIAPINE FUMARATE 25 MG: 25 TABLET ORAL at 21:30

## 2021-12-01 RX ADMIN — ESCITALOPRAM OXALATE 5 MG: 10 TABLET ORAL at 08:35

## 2021-12-01 RX ADMIN — BUSPIRONE HYDROCHLORIDE 5 MG: 5 TABLET ORAL at 08:36

## 2021-12-01 RX ADMIN — ENOXAPARIN SODIUM 30 MG: 30 INJECTION SUBCUTANEOUS at 08:36

## 2021-12-01 RX ADMIN — ASPIRIN 81 MG: 81 TABLET, CHEWABLE ORAL at 08:36

## 2021-12-01 RX ADMIN — DOCUSATE SODIUM AND SENNOSIDES 1 TABLET: 8.6; 5 TABLET ORAL at 21:30

## 2021-12-01 RX ADMIN — BUSPIRONE HYDROCHLORIDE 5 MG: 5 TABLET ORAL at 21:30

## 2021-12-02 ENCOUNTER — PATIENT OUTREACH (OUTPATIENT)
Dept: CASE MANAGEMENT | Facility: OTHER | Age: 76
End: 2021-12-02

## 2021-12-02 ENCOUNTER — PATIENT OUTREACH (OUTPATIENT)
Dept: CASE MANAGEMENT | Facility: HOSPITAL | Age: 76
End: 2021-12-02

## 2021-12-02 VITALS
DIASTOLIC BLOOD PRESSURE: 72 MMHG | HEART RATE: 72 BPM | WEIGHT: 162.26 LBS | SYSTOLIC BLOOD PRESSURE: 132 MMHG | BODY MASS INDEX: 24.03 KG/M2 | OXYGEN SATURATION: 100 % | TEMPERATURE: 97.4 F | HEIGHT: 69 IN | RESPIRATION RATE: 18 BRPM

## 2021-12-02 PROBLEM — N17.9 AKI (ACUTE KIDNEY INJURY) (HCC): Status: RESOLVED | Noted: 2021-11-27 | Resolved: 2021-12-02

## 2021-12-02 PROBLEM — E87.6 HYPOKALEMIA: Status: RESOLVED | Noted: 2021-12-01 | Resolved: 2021-12-02

## 2021-12-02 PROBLEM — E87.0 HYPERNATREMIA: Status: RESOLVED | Noted: 2021-11-27 | Resolved: 2021-12-02

## 2021-12-02 PROCEDURE — 99239 HOSP IP/OBS DSCHRG MGMT >30: CPT | Performed by: PHYSICIAN ASSISTANT

## 2021-12-02 RX ORDER — ACETAMINOPHEN 325 MG/1
650 TABLET ORAL EVERY 6 HOURS PRN
Qty: 20 TABLET | Refills: 0
Start: 2021-12-02 | End: 2022-01-15 | Stop reason: HOSPADM

## 2021-12-02 RX ORDER — CALAMINE, MENTHOL, WHITE PETROLATUM, ZINC OXIDE .5; .2; 69; 19.5 G/100G; G/100G; G/100G; G/100G
PASTE TOPICAL
Refills: 0
Start: 2021-12-02

## 2021-12-02 RX ORDER — ACETAMINOPHEN 325 MG/1
975 TABLET ORAL EVERY 6 HOURS PRN
Qty: 20 TABLET | Refills: 0
Start: 2021-12-02 | End: 2021-12-02 | Stop reason: SDUPTHER

## 2021-12-02 RX ORDER — BUSPIRONE HYDROCHLORIDE 5 MG/1
5 TABLET ORAL 2 TIMES DAILY
Qty: 90 TABLET | Refills: 0 | Status: SHIPPED | OUTPATIENT
Start: 2021-12-02 | End: 2021-12-11 | Stop reason: SDUPTHER

## 2021-12-02 RX ADMIN — ESCITALOPRAM OXALATE 5 MG: 10 TABLET ORAL at 08:37

## 2021-12-02 RX ADMIN — DONEPEZIL HYDROCHLORIDE 10 MG: 5 TABLET ORAL at 08:37

## 2021-12-02 RX ADMIN — ASPIRIN 81 MG: 81 TABLET, CHEWABLE ORAL at 08:37

## 2021-12-02 RX ADMIN — OXYCODONE HYDROCHLORIDE AND ACETAMINOPHEN 500 MG: 500 TABLET ORAL at 08:37

## 2021-12-02 RX ADMIN — BUSPIRONE HYDROCHLORIDE 5 MG: 5 TABLET ORAL at 08:37

## 2021-12-02 RX ADMIN — TAMSULOSIN HYDROCHLORIDE 0.4 MG: 0.4 CAPSULE ORAL at 08:37

## 2021-12-02 RX ADMIN — ENOXAPARIN SODIUM 30 MG: 30 INJECTION SUBCUTANEOUS at 08:37

## 2021-12-03 ENCOUNTER — TRANSITIONAL CARE MANAGEMENT (OUTPATIENT)
Dept: FAMILY MEDICINE CLINIC | Facility: CLINIC | Age: 76
End: 2021-12-03

## 2021-12-06 ENCOUNTER — TELEMEDICINE (OUTPATIENT)
Dept: FAMILY MEDICINE CLINIC | Facility: CLINIC | Age: 76
End: 2021-12-06
Payer: MEDICARE

## 2021-12-06 VITALS — SYSTOLIC BLOOD PRESSURE: 124 MMHG | DIASTOLIC BLOOD PRESSURE: 52 MMHG | TEMPERATURE: 98.3 F | HEART RATE: 64 BPM

## 2021-12-06 DIAGNOSIS — F02.80 ALZHEIMER'S DISEASE (HCC): ICD-10-CM

## 2021-12-06 DIAGNOSIS — I10 ESSENTIAL HYPERTENSION: ICD-10-CM

## 2021-12-06 DIAGNOSIS — R41.3 MEMORY IMPAIRMENT: ICD-10-CM

## 2021-12-06 DIAGNOSIS — G93.40 ENCEPHALOPATHY: ICD-10-CM

## 2021-12-06 DIAGNOSIS — L89.152 SACRAL DECUBITUS ULCER, STAGE II (HCC): ICD-10-CM

## 2021-12-06 DIAGNOSIS — S72.141A INTERTROCHANTERIC FRACTURE OF RIGHT FEMUR (HCC): ICD-10-CM

## 2021-12-06 DIAGNOSIS — E44.0 MODERATE PROTEIN-CALORIE MALNUTRITION (HCC): ICD-10-CM

## 2021-12-06 DIAGNOSIS — E78.00 HYPERCHOLESTEROLEMIA: ICD-10-CM

## 2021-12-06 DIAGNOSIS — D50.9 IRON DEFICIENCY ANEMIA, UNSPECIFIED IRON DEFICIENCY ANEMIA TYPE: ICD-10-CM

## 2021-12-06 DIAGNOSIS — Z96.651 S/P TOTAL KNEE REPLACEMENT, RIGHT: ICD-10-CM

## 2021-12-06 DIAGNOSIS — S72.001S CLOSED FRACTURE OF RIGHT HIP, SEQUELA: Primary | ICD-10-CM

## 2021-12-06 DIAGNOSIS — N40.0 BENIGN PROSTATIC HYPERPLASIA WITHOUT LOWER URINARY TRACT SYMPTOMS: ICD-10-CM

## 2021-12-06 DIAGNOSIS — R53.83 FATIGUE, UNSPECIFIED TYPE: ICD-10-CM

## 2021-12-06 DIAGNOSIS — G30.9 ALZHEIMER'S DISEASE (HCC): ICD-10-CM

## 2021-12-06 PROCEDURE — 99496 TRANSJ CARE MGMT HIGH F2F 7D: CPT | Performed by: NURSE PRACTITIONER

## 2021-12-08 ENCOUNTER — TELEPHONE (OUTPATIENT)
Dept: OBGYN CLINIC | Facility: OTHER | Age: 76
End: 2021-12-08

## 2021-12-08 ENCOUNTER — LAB REQUISITION (OUTPATIENT)
Dept: LAB | Facility: HOSPITAL | Age: 76
End: 2021-12-08
Payer: MEDICARE

## 2021-12-08 ENCOUNTER — TELEPHONE (OUTPATIENT)
Dept: FAMILY MEDICINE CLINIC | Facility: CLINIC | Age: 76
End: 2021-12-08

## 2021-12-08 DIAGNOSIS — E87.6 HYPOKALEMIA: ICD-10-CM

## 2021-12-08 DIAGNOSIS — R09.89 RUNNY NOSE: Primary | ICD-10-CM

## 2021-12-08 LAB
ANION GAP SERPL CALCULATED.3IONS-SCNC: 13 MMOL/L (ref 4–13)
BUN SERPL-MCNC: 15 MG/DL (ref 5–25)
CALCIUM SERPL-MCNC: 8.4 MG/DL (ref 8.3–10.1)
CHLORIDE SERPL-SCNC: 106 MMOL/L (ref 100–108)
CO2 SERPL-SCNC: 21 MMOL/L (ref 21–32)
CREAT SERPL-MCNC: 0.67 MG/DL (ref 0.6–1.3)
GFR SERPL CREATININE-BSD FRML MDRD: 93 ML/MIN/1.73SQ M
GLUCOSE SERPL-MCNC: 97 MG/DL (ref 65–140)
POTASSIUM SERPL-SCNC: 3.6 MMOL/L (ref 3.5–5.3)
SODIUM SERPL-SCNC: 140 MMOL/L (ref 136–145)

## 2021-12-08 PROCEDURE — 80048 BASIC METABOLIC PNL TOTAL CA: CPT | Performed by: PHYSICIAN ASSISTANT

## 2021-12-08 RX ORDER — FLUTICASONE PROPIONATE 50 MCG
1 SPRAY, SUSPENSION (ML) NASAL DAILY
Qty: 18 ML | Refills: 0 | Status: ON HOLD | OUTPATIENT
Start: 2021-12-08 | End: 2022-01-03

## 2021-12-09 ENCOUNTER — PATIENT OUTREACH (OUTPATIENT)
Dept: CASE MANAGEMENT | Facility: HOSPITAL | Age: 76
End: 2021-12-09

## 2021-12-11 RX ORDER — QUETIAPINE FUMARATE 25 MG/1
25 TABLET, FILM COATED ORAL
Qty: 90 TABLET | Refills: 1 | Status: SHIPPED | OUTPATIENT
Start: 2021-12-11 | End: 2021-12-27 | Stop reason: SDUPTHER

## 2021-12-11 RX ORDER — ASPIRIN 81 MG/1
81 TABLET, CHEWABLE ORAL DAILY
Qty: 90 TABLET | Refills: 1 | Status: SHIPPED | OUTPATIENT
Start: 2021-12-11 | End: 2021-12-30

## 2021-12-11 RX ORDER — BUSPIRONE HYDROCHLORIDE 5 MG/1
5 TABLET ORAL 2 TIMES DAILY
Qty: 90 TABLET | Refills: 0 | Status: SHIPPED | OUTPATIENT
Start: 2021-12-11 | End: 2022-01-31 | Stop reason: SDUPTHER

## 2021-12-11 RX ORDER — SIMVASTATIN 20 MG
20 TABLET ORAL
Qty: 90 TABLET | Refills: 1 | Status: SHIPPED | OUTPATIENT
Start: 2021-12-11 | End: 2022-03-17

## 2021-12-11 RX ORDER — AMOXICILLIN 250 MG
1 CAPSULE ORAL
Qty: 10 TABLET | Refills: 0
Start: 2021-12-11

## 2021-12-11 RX ORDER — DONEPEZIL HYDROCHLORIDE 10 MG/1
10 TABLET, FILM COATED ORAL DAILY
Qty: 90 TABLET | Refills: 1 | Status: SHIPPED | OUTPATIENT
Start: 2021-12-11

## 2021-12-11 RX ORDER — TAMSULOSIN HYDROCHLORIDE 0.4 MG/1
0.4 CAPSULE ORAL DAILY
Qty: 90 CAPSULE | Refills: 1 | Status: SHIPPED | OUTPATIENT
Start: 2021-12-11 | End: 2022-03-11

## 2021-12-11 RX ORDER — ESCITALOPRAM OXALATE 5 MG/1
5 TABLET ORAL DAILY
Qty: 90 TABLET | Refills: 1 | Status: SHIPPED | OUTPATIENT
Start: 2021-12-11 | End: 2021-12-27 | Stop reason: SDUPTHER

## 2021-12-17 RX ORDER — SYRINGE, DISPOSABLE, 3 ML
SYRINGE, EMPTY DISPOSABLE MISCELLANEOUS
Qty: 12 EACH | Refills: 1 | Status: SHIPPED | OUTPATIENT
Start: 2021-12-17 | End: 2022-11-13

## 2021-12-17 RX ORDER — CYANOCOBALAMIN 1000 UG/ML
1000 INJECTION INTRAMUSCULAR; SUBCUTANEOUS
Qty: 12 ML | Refills: 1 | Status: SHIPPED | OUTPATIENT
Start: 2021-12-17 | End: 2022-11-13

## 2021-12-27 DIAGNOSIS — G93.40 ENCEPHALOPATHY: ICD-10-CM

## 2021-12-28 RX ORDER — QUETIAPINE FUMARATE 25 MG/1
25 TABLET, FILM COATED ORAL
Qty: 90 TABLET | Refills: 0 | Status: SHIPPED | OUTPATIENT
Start: 2021-12-28 | End: 2021-12-31 | Stop reason: SDUPTHER

## 2021-12-28 RX ORDER — ESCITALOPRAM OXALATE 5 MG/1
5 TABLET ORAL DAILY
Qty: 90 TABLET | Refills: 0 | Status: SHIPPED | OUTPATIENT
Start: 2021-12-28 | End: 2021-12-30

## 2021-12-30 ENCOUNTER — TELEMEDICINE (OUTPATIENT)
Dept: FAMILY MEDICINE CLINIC | Facility: CLINIC | Age: 76
End: 2021-12-30
Payer: MEDICARE

## 2021-12-30 ENCOUNTER — TELEPHONE (OUTPATIENT)
Dept: FAMILY MEDICINE CLINIC | Facility: CLINIC | Age: 76
End: 2021-12-30

## 2021-12-30 DIAGNOSIS — G93.40 ENCEPHALOPATHY: ICD-10-CM

## 2021-12-30 DIAGNOSIS — F03.91 AGITATION DUE TO DEMENTIA (HCC): Primary | ICD-10-CM

## 2021-12-30 DIAGNOSIS — T14.8XXA BRUISING: ICD-10-CM

## 2021-12-30 DIAGNOSIS — M25.561 CHRONIC PAIN OF BOTH KNEES: ICD-10-CM

## 2021-12-30 DIAGNOSIS — G89.29 CHRONIC PAIN OF BOTH KNEES: ICD-10-CM

## 2021-12-30 DIAGNOSIS — M25.562 CHRONIC PAIN OF BOTH KNEES: ICD-10-CM

## 2021-12-30 PROCEDURE — 99213 OFFICE O/P EST LOW 20 MIN: CPT | Performed by: NURSE PRACTITIONER

## 2021-12-30 RX ORDER — ACETAMINOPHEN AND CODEINE PHOSPHATE 300; 30 MG/1; MG/1
1 TABLET ORAL EVERY 12 HOURS
Qty: 20 TABLET | Refills: 0 | Status: SHIPPED | OUTPATIENT
Start: 2021-12-30 | End: 2022-01-15 | Stop reason: HOSPADM

## 2021-12-30 RX ORDER — ESCITALOPRAM OXALATE 10 MG/1
10 TABLET ORAL DAILY
Qty: 90 TABLET | Refills: 0 | Status: SHIPPED | OUTPATIENT
Start: 2021-12-30 | End: 2022-03-30

## 2021-12-31 DIAGNOSIS — G93.40 ENCEPHALOPATHY: ICD-10-CM

## 2021-12-31 RX ORDER — QUETIAPINE FUMARATE 25 MG/1
25 TABLET, FILM COATED ORAL
Qty: 90 TABLET | Refills: 0 | Status: SHIPPED | OUTPATIENT
Start: 2021-12-31 | End: 2022-02-15

## 2022-01-01 ENCOUNTER — HOME CARE VISIT (OUTPATIENT)
Dept: HOME HOSPICE | Facility: HOSPICE | Age: 77
End: 2022-01-01

## 2022-01-01 ENCOUNTER — HOME CARE VISIT (OUTPATIENT)
Dept: HOME HEALTH SERVICES | Facility: HOME HEALTHCARE | Age: 77
End: 2022-01-01

## 2022-01-01 ENCOUNTER — TRANSCRIBE ORDERS (OUTPATIENT)
Dept: HOME HEALTH SERVICES | Facility: HOME HEALTHCARE | Age: 77
End: 2022-01-01

## 2022-01-01 ENCOUNTER — HOSPICE ADMISSION (OUTPATIENT)
Dept: HOME HOSPICE | Facility: HOSPICE | Age: 77
End: 2022-01-01

## 2022-01-01 VITALS
BODY MASS INDEX: 23.02 KG/M2 | HEART RATE: 76 BPM | TEMPERATURE: 98.7 F | WEIGHT: 155.4 LBS | HEIGHT: 69 IN | RESPIRATION RATE: 16 BRPM | SYSTOLIC BLOOD PRESSURE: 122 MMHG | DIASTOLIC BLOOD PRESSURE: 72 MMHG

## 2022-01-01 DIAGNOSIS — G96.9 CENTRAL NERVOUS SYSTEM COMPLICATION: Primary | ICD-10-CM

## 2022-01-01 DIAGNOSIS — R09.89 RUNNY NOSE: ICD-10-CM

## 2022-01-02 ENCOUNTER — APPOINTMENT (EMERGENCY)
Dept: RADIOLOGY | Facility: HOSPITAL | Age: 77
DRG: 480 | End: 2022-01-02
Payer: MEDICARE

## 2022-01-02 ENCOUNTER — HOSPITAL ENCOUNTER (INPATIENT)
Facility: HOSPITAL | Age: 77
LOS: 13 days | Discharge: NURSING, REHAB OR LONG-TERM CARE | DRG: 480 | End: 2022-01-15
Attending: EMERGENCY MEDICINE | Admitting: INTERNAL MEDICINE
Payer: MEDICARE

## 2022-01-02 ENCOUNTER — APPOINTMENT (INPATIENT)
Dept: RADIOLOGY | Facility: HOSPITAL | Age: 77
DRG: 480 | End: 2022-01-02
Payer: MEDICARE
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
## 2022-01-02 DIAGNOSIS — T14.8XXA SUPERFICIAL LACERATION OF SKIN: ICD-10-CM

## 2022-01-02 DIAGNOSIS — G30.9 ALZHEIMER'S DISEASE (HCC): ICD-10-CM

## 2022-01-02 DIAGNOSIS — S72.002A FRACTURE OF UNSPECIFIED PART OF NECK OF LEFT FEMUR, INITIAL ENCOUNTER FOR CLOSED FRACTURE (HCC): Primary | ICD-10-CM

## 2022-01-02 DIAGNOSIS — F02.80 ALZHEIMER'S DISEASE (HCC): ICD-10-CM

## 2022-01-02 PROBLEM — R26.2 AMBULATORY DYSFUNCTION: Status: ACTIVE | Noted: 2022-01-02

## 2022-01-02 LAB
ABO GROUP BLD: NORMAL
ANION GAP SERPL CALCULATED.3IONS-SCNC: 6 MMOL/L (ref 4–13)
APTT PPP: 31 SECONDS (ref 23–37)
BASOPHILS # BLD AUTO: 0.04 THOUSANDS/ΜL (ref 0–0.1)
BASOPHILS NFR BLD AUTO: 0 % (ref 0–1)
BLD GP AB SCN SERPL QL: NEGATIVE
BUN SERPL-MCNC: 18 MG/DL (ref 5–25)
CALCIUM SERPL-MCNC: 8.9 MG/DL (ref 8.3–10.1)
CHLORIDE SERPL-SCNC: 109 MMOL/L (ref 100–108)
CO2 SERPL-SCNC: 22 MMOL/L (ref 21–32)
CREAT SERPL-MCNC: 0.59 MG/DL (ref 0.6–1.3)
EOSINOPHIL # BLD AUTO: 0.09 THOUSAND/ΜL (ref 0–0.61)
EOSINOPHIL NFR BLD AUTO: 1 % (ref 0–6)
ERYTHROCYTE [DISTWIDTH] IN BLOOD BY AUTOMATED COUNT: 14.2 % (ref 11.6–15.1)
FLUAV RNA RESP QL NAA+PROBE: NEGATIVE
FLUBV RNA RESP QL NAA+PROBE: NEGATIVE
GFR SERPL CREATININE-BSD FRML MDRD: 97 ML/MIN/1.73SQ M
GLUCOSE SERPL-MCNC: 85 MG/DL (ref 65–140)
HCT VFR BLD AUTO: 32.8 % (ref 36.5–49.3)
HGB BLD-MCNC: 10.8 G/DL (ref 12–17)
IMM GRANULOCYTES # BLD AUTO: 0.03 THOUSAND/UL (ref 0–0.2)
IMM GRANULOCYTES NFR BLD AUTO: 0 % (ref 0–2)
INR PPP: 0.98 (ref 0.84–1.19)
LYMPHOCYTES # BLD AUTO: 1.38 THOUSANDS/ΜL (ref 0.6–4.47)
LYMPHOCYTES NFR BLD AUTO: 14 % (ref 14–44)
MCH RBC QN AUTO: 29 PG (ref 26.8–34.3)
MCHC RBC AUTO-ENTMCNC: 32.9 G/DL (ref 31.4–37.4)
MCV RBC AUTO: 88 FL (ref 82–98)
MONOCYTES # BLD AUTO: 0.58 THOUSAND/ΜL (ref 0.17–1.22)
MONOCYTES NFR BLD AUTO: 6 % (ref 4–12)
NEUTROPHILS # BLD AUTO: 7.57 THOUSANDS/ΜL (ref 1.85–7.62)
NEUTS SEG NFR BLD AUTO: 79 % (ref 43–75)
NRBC BLD AUTO-RTO: 0 /100 WBCS
PLATELET # BLD AUTO: 270 THOUSANDS/UL (ref 149–390)
PMV BLD AUTO: 10.1 FL (ref 8.9–12.7)
POTASSIUM SERPL-SCNC: 4 MMOL/L (ref 3.5–5.3)
PROTHROMBIN TIME: 12.6 SECONDS (ref 11.6–14.5)
RBC # BLD AUTO: 3.73 MILLION/UL (ref 3.88–5.62)
RH BLD: NEGATIVE
RSV RNA RESP QL NAA+PROBE: NEGATIVE
SARS-COV-2 RNA RESP QL NAA+PROBE: NEGATIVE
SODIUM SERPL-SCNC: 137 MMOL/L (ref 136–145)
SPECIMEN EXPIRATION DATE: NORMAL
WBC # BLD AUTO: 9.69 THOUSAND/UL (ref 4.31–10.16)

## 2022-01-02 PROCEDURE — 99223 1ST HOSP IP/OBS HIGH 75: CPT | Performed by: INTERNAL MEDICINE

## 2022-01-02 PROCEDURE — 99285 EMERGENCY DEPT VISIT HI MDM: CPT

## 2022-01-02 PROCEDURE — 86900 BLOOD TYPING SEROLOGIC ABO: CPT | Performed by: EMERGENCY MEDICINE

## 2022-01-02 PROCEDURE — 87636 SARSCOV2 & INF A&B AMP PRB: CPT | Performed by: INTERNAL MEDICINE

## 2022-01-02 PROCEDURE — 36415 COLL VENOUS BLD VENIPUNCTURE: CPT

## 2022-01-02 PROCEDURE — 80048 BASIC METABOLIC PNL TOTAL CA: CPT

## 2022-01-02 PROCEDURE — 99285 EMERGENCY DEPT VISIT HI MDM: CPT | Performed by: EMERGENCY MEDICINE

## 2022-01-02 PROCEDURE — 85730 THROMBOPLASTIN TIME PARTIAL: CPT | Performed by: EMERGENCY MEDICINE

## 2022-01-02 PROCEDURE — 85610 PROTHROMBIN TIME: CPT | Performed by: EMERGENCY MEDICINE

## 2022-01-02 PROCEDURE — 12002 RPR S/N/AX/GEN/TRNK2.6-7.5CM: CPT | Performed by: EMERGENCY MEDICINE

## 2022-01-02 PROCEDURE — 71045 X-RAY EXAM CHEST 1 VIEW: CPT

## 2022-01-02 PROCEDURE — 85025 COMPLETE CBC W/AUTO DIFF WBC: CPT

## 2022-01-02 PROCEDURE — 70450 CT HEAD/BRAIN W/O DYE: CPT

## 2022-01-02 PROCEDURE — 73552 X-RAY EXAM OF FEMUR 2/>: CPT

## 2022-01-02 PROCEDURE — G1004 CDSM NDSC: HCPCS

## 2022-01-02 PROCEDURE — 73502 X-RAY EXAM HIP UNI 2-3 VIEWS: CPT

## 2022-01-02 PROCEDURE — 72192 CT PELVIS W/O DYE: CPT

## 2022-01-02 PROCEDURE — 86901 BLOOD TYPING SEROLOGIC RH(D): CPT | Performed by: EMERGENCY MEDICINE

## 2022-01-02 PROCEDURE — 93005 ELECTROCARDIOGRAM TRACING: CPT

## 2022-01-02 PROCEDURE — 0241U HB NFCT DS VIR RESP RNA 4 TRGT: CPT | Performed by: INTERNAL MEDICINE

## 2022-01-02 PROCEDURE — 86850 RBC ANTIBODY SCREEN: CPT | Performed by: EMERGENCY MEDICINE

## 2022-01-02 PROCEDURE — 72125 CT NECK SPINE W/O DYE: CPT

## 2022-01-02 RX ORDER — ACETAMINOPHEN 325 MG/1
975 TABLET ORAL EVERY 8 HOURS SCHEDULED
Status: DISCONTINUED | OUTPATIENT
Start: 2022-01-02 | End: 2022-01-15 | Stop reason: HOSPADM

## 2022-01-02 RX ORDER — TAMSULOSIN HYDROCHLORIDE 0.4 MG/1
0.4 CAPSULE ORAL DAILY
Status: DISCONTINUED | OUTPATIENT
Start: 2022-01-03 | End: 2022-01-15 | Stop reason: HOSPADM

## 2022-01-02 RX ORDER — MIDAZOLAM HYDROCHLORIDE 2 MG/2ML
2 INJECTION, SOLUTION INTRAMUSCULAR; INTRAVENOUS ONCE
Status: DISCONTINUED | OUTPATIENT
Start: 2022-01-02 | End: 2022-01-02

## 2022-01-02 RX ORDER — CEFAZOLIN SODIUM 2 G/50ML
2000 SOLUTION INTRAVENOUS ONCE
Status: CANCELLED | OUTPATIENT
Start: 2022-01-02 | End: 2022-01-02

## 2022-01-02 RX ORDER — FLUTICASONE PROPIONATE 50 MCG
1 SPRAY, SUSPENSION (ML) NASAL DAILY
Status: DISCONTINUED | OUTPATIENT
Start: 2022-01-03 | End: 2022-01-15 | Stop reason: HOSPADM

## 2022-01-02 RX ORDER — HEPARIN SODIUM 5000 [USP'U]/ML
5000 INJECTION, SOLUTION INTRAVENOUS; SUBCUTANEOUS EVERY 8 HOURS SCHEDULED
Status: DISCONTINUED | OUTPATIENT
Start: 2022-01-02 | End: 2022-01-04

## 2022-01-02 RX ORDER — DONEPEZIL HYDROCHLORIDE 10 MG/1
10 TABLET, FILM COATED ORAL DAILY
Status: DISCONTINUED | OUTPATIENT
Start: 2022-01-03 | End: 2022-01-15 | Stop reason: HOSPADM

## 2022-01-02 RX ORDER — OLANZAPINE 5 MG/1
5 TABLET ORAL ONCE
Status: COMPLETED | OUTPATIENT
Start: 2022-01-02 | End: 2022-01-02

## 2022-01-02 RX ORDER — OXYCODONE HYDROCHLORIDE 5 MG/1
5 TABLET ORAL EVERY 4 HOURS PRN
Status: DISCONTINUED | OUTPATIENT
Start: 2022-01-02 | End: 2022-01-10

## 2022-01-02 RX ORDER — ESCITALOPRAM OXALATE 10 MG/1
10 TABLET ORAL DAILY
Status: DISCONTINUED | OUTPATIENT
Start: 2022-01-03 | End: 2022-01-15 | Stop reason: HOSPADM

## 2022-01-02 RX ORDER — QUETIAPINE FUMARATE 25 MG/1
25 TABLET, FILM COATED ORAL
Status: DISCONTINUED | OUTPATIENT
Start: 2022-01-02 | End: 2022-01-15 | Stop reason: HOSPADM

## 2022-01-02 RX ORDER — PRAVASTATIN SODIUM 40 MG
40 TABLET ORAL
Status: DISCONTINUED | OUTPATIENT
Start: 2022-01-03 | End: 2022-01-15 | Stop reason: HOSPADM

## 2022-01-02 RX ORDER — ONDANSETRON 2 MG/ML
4 INJECTION INTRAMUSCULAR; INTRAVENOUS EVERY 6 HOURS PRN
Status: DISCONTINUED | OUTPATIENT
Start: 2022-01-02 | End: 2022-01-15 | Stop reason: HOSPADM

## 2022-01-02 RX ORDER — BUSPIRONE HYDROCHLORIDE 5 MG/1
5 TABLET ORAL 2 TIMES DAILY
Status: DISCONTINUED | OUTPATIENT
Start: 2022-01-02 | End: 2022-01-15 | Stop reason: HOSPADM

## 2022-01-02 RX ORDER — AMOXICILLIN 250 MG
1 CAPSULE ORAL
Status: DISCONTINUED | OUTPATIENT
Start: 2022-01-02 | End: 2022-01-15 | Stop reason: HOSPADM

## 2022-01-02 RX ADMIN — OXYCODONE HYDROCHLORIDE 5 MG: 5 TABLET ORAL at 17:57

## 2022-01-02 RX ADMIN — ACETAMINOPHEN 975 MG: 325 TABLET, FILM COATED ORAL at 23:48

## 2022-01-02 RX ADMIN — OLANZAPINE 5 MG: 5 TABLET, FILM COATED ORAL at 17:15

## 2022-01-02 RX ADMIN — HEPARIN SODIUM 5000 UNITS: 5000 INJECTION INTRAVENOUS; SUBCUTANEOUS at 23:48

## 2022-01-02 RX ADMIN — OXYCODONE HYDROCHLORIDE 5 MG: 5 TABLET ORAL at 23:47

## 2022-01-02 RX ADMIN — QUETIAPINE FUMARATE 25 MG: 25 TABLET ORAL at 23:47

## 2022-01-02 RX ADMIN — ACETAMINOPHEN 975 MG: 325 TABLET, FILM COATED ORAL at 17:57

## 2022-01-02 NOTE — ASSESSMENT & PLAN NOTE
Previous history of right hip fracture status post ORIF on 11/5/2021  PT JOSE thomason after evaluated by orthopedics

## 2022-01-02 NOTE — CONSULTS
Orthopedics   Penelope Mcdowell 67 y o  male MRN: [id number]  Unit/Bed#: X ray      Chief Complaint:   left hip pain    HPI:   67 y  o male ambulates with walker pmhx of dementia status post fall from wheel chair complaining of left hip pain and inability to bear weight  Pain is well localized to the hip and is made worse with motion or contact to the area  He lives at home with his wife who is his caregiver  He has severe dementia and got out of the wheelchair, fell, + headstrike, - LOC  No thinners  Hx of right IT fx s/p IMN by Dr Des Plunkett on 11/5/21      Review Of Systems:   · Skin: Normal  · Neuro: See HPI  · Musculoskeletal: See HPI  · 14 point review of systems unobtainable due to pts condition    Past Medical History:   Past Medical History:   Diagnosis Date    Alzheimer disease ([facility] [address] )     Anemia     BPH (benign prostatic hyperplasia)     Dementia ([facility] [address] )     Hyperlipidemia     Hypertension     Memory loss        Past Surgical History:   Past Surgical History:   Procedure Laterality Date    COLONOSCOPY  11/19/2019    5 years    CT OPEN RX FEMUR FX+INTRAMED JOSE Right 11/5/2021    Procedure: INSERTION LONG NAIL IM FEMUR ANTEGRADE (TROCHANTERIC);   Surgeon: Carolynn Danielle DO;  [facility]: [facility];  Service: Orthopedics    REPLACEMENT TOTAL KNEE Right 06/19/2019       Family History:  Family history reviewed and non-contributory  Family History   Problem Relation Age of Onset    Lung disease Father         coal workers' pneumoconiosis       Social History:  Social History     Socioeconomic History    Marital status: /Civil Union     Spouse name: None    Number of children: None    Years of education: None    Highest education level: None   Occupational History    None   Tobacco Use    Smoking status: Former Smoker     Packs/day: 0 25     Years: 10 00     Pack years: 2 50    Smokeless tobacco: Never Used    Tobacco comment: 1PPW   Vaping Use    Vaping Use: Never used   Substance and Sexual Activity    Alcohol use: Not Currently    Drug use: Not Currently    Sexual activity: Not Currently   Other Topics Concern    None   Social History Narrative    History of Holter Monitor: 2019    History of ECHO: 2019    · Most recent tobacco use screenin2019      · Do you currently or have you served in the [employer]:   No      Social Determinants of Health     Financial Resource Strain: Not on file   Food Insecurity: Not on file   Transportation Needs: Not on file   Physical Activity: Not on file   Stress: Not on file   Social Connections: Not on file   Intimate Partner Violence: Not on file   Housing Stability: Not on file       Allergies:   No Known Allergies        Labs:  0   Lab Value Date/Time    HCT 32 8 (L) 2022 1808    HCT 33 0 (L) 2021 0525    HCT 34 7 (L) 2021 0930    HGB 10 8 (L) 2022 1808    HGB 10 2 (L) 2021 0525    HGB 10 7 (L) 2021 0930    INR 0 98 2022 1808    WBC 9 69 2022 1808    WBC 7 09 2021 0525    WBC 6 04 2021 0930       Meds:    Current Facility-Administered Medications:     acetaminophen (TYLENOL) tablet 975 mg, 975 mg, Oral, Q8H [facility], Chris Yu DO, 975 mg at 22 175    oxyCODONE (ROXICODONE) IR tablet 5 mg, 5 mg, Oral, Q4H PRN, Chris Yu DO, 5 mg at 22 1757    Current Outpatient Medications:     acetaminophen (TYLENOL) 325 mg tablet, Take 2 tablets (650 mg total) by mouth every 6 (six) hours as needed for mild pain, Disp: 20 tablet, Rfl: 0    acetaminophen-codeine (TYLENOL #3) 300-30 mg per tablet, Take 1 tablet by mouth every 12 (twelve) hours for 10 days, Disp: 20 tablet, Rfl: 0    busPIRone (BUSPAR) 5 mg tablet, Take 1 tablet (5 mg total) by mouth 2 (two) times a day, Disp: 90 tablet, Rfl: 0    cyanocobalamin 1,000 mcg/mL, Inject 1 mL (1,000 mcg total) into a muscle every 30 (thirty) days for 12 doses, Disp: 12 mL, Rfl: 1    donepezil (ARICEPT) 10 mg tablet, Take 1 tablet (10 mg total) by mouth daily, Disp: 90 tablet, Rfl: 1    escitalopram (LEXAPRO) 10 mg tablet, Take 1 tablet (10 mg total) by mouth daily, Disp: 90 tablet, Rfl: 0    fluticasone (FLONASE) 50 mcg/act nasal spray, 1 spray into each nostril daily, Disp: 18 mL, Rfl: 0    QUEtiapine (SEROquel) 25 mg tablet, Take 1 tablet (25 mg total) by mouth daily at bedtime, Disp: 90 tablet, Rfl: 0    senna-docusate sodium (SENOKOT S) 8 6-50 mg per tablet, Take 1 tablet by mouth daily at bedtime, Disp: 10 tablet, Rfl: 0    simvastatin (ZOCOR) 20 mg tablet, Take 1 tablet (20 mg total) by mouth daily at bedtime, Disp: 90 tablet, Rfl: 1    Skin Protectants, Misc  (Calazime Skin Protectant) PSTE, Apply to sacral wound daily, Disp: , Rfl: 0    Syringe, Disposable, (2-3CC SYRINGE) 3 ML MISC, Use every 30 (thirty) days for 12 doses Please provide needles  And syringes for B12 injections, Disp: 12 each, Rfl: 1    tamsulosin (FLOMAX) 0 4 mg, Take 1 capsule (0 4 mg total) by mouth daily, Disp: 90 capsule, Rfl: 1    Blood Culture:   No results found for: BLOODCX    Wound Culture:   No results found for: WOUNDCULT    Ins and Outs:  No intake/output data recorded  Physical Exam:   BP (!) 141/112   Pulse 86   Temp 97 7 °F (36 5 °C) (Oral)   Resp (!) 30   SpO2 98%    Exam limited due to severe dementia   Gen: Alert, not oriented to person, place, time  HEENT: EOMI, eyes clear, moist mucus membranes, hearing intact  Respiratory: Bilateral chest rise  No audible wheezing found  Cardiovascular: Regular Rate and Rhythm  Abdomen: soft nontender/nondistended  Musculoskeletal: left lower extremity  · Skin intact  · Tender to palpation over hip  · ROM not assessed secondary to known fx  · Sensation intact L3-S1  · Positive ankle dorsi/plantar flexion, EHL/FHL  · 2+ DP pulse      Radiology:   I personally reviewed the films  X-rays left hip and full length femur shows valgus impacted femoral neck fracture        Assessment:  67 y  o male status post fall from wheelchair with left femoral neck fracture    Plan:   · Non weight bearing left lower extremity  · Analgesics for pain  · Informed consent obtained  · Pre op labs in ED  · NPO at midnight  · Arredondo Catheter insertion  · Medicine consult for all medical management and preoperative risk stratification  · To OR for Percutaneous hip pinning  · There is no height or weight on file to calculate BMI     · Dispo: Ortho will follow      Gregg Mejia MD

## 2022-01-02 NOTE — PROGRESS NOTES
Virtual Brief Visit    Patient is located in the following state in which I hold an active license [place]      Assessment/Plan:    Received a call from his PT that he is having increased agitation   Does not want to stand up to do therapy   Did reach out to wife who states this is unusual for him however she feels that he is guarding he knees and rubbing them   He does have arthritis issues with his joints however unable to verbalize    Discussed with wife increasing his Lexapro to 10 mg he is only on 5 mg once daily   Also have added some tylenol 3 for better pain management hope in helps and he will need to follow up with ORTHO     We will follow up in 1 week and see how he is doing   Problem List Items Addressed This Visit        Nervous and Auditory    Encephalopathy    Relevant Medications    escitalopram (LEXAPRO) 10 mg tablet      Other Visit Diagnoses     Agitation due to dementia ([facility] [address] )    -  Primary    Relevant Medications    acetaminophen-codeine (TYLENOL #3) 300-30 mg per tablet    Chronic pain of both knees        difficulty with ambulation - running on knees - increasinly agitated   does not want to stand and walk     Relevant Medications    acetaminophen-codeine (TYLENOL #3) 300-30 mg per tablet    Bruising        stop  aspirin           Recent Visits  Date Type Provider Dept   12/30/21 [address], HORACIO  Primary Care OS   12/30/21 Telephone Susan Rees, [address] [facility] Primary Care OS   Showing recent visits within past 7 days and meeting all other requirements  Future Appointments  No visits were found meeting these conditions    Showing future appointments within next 150 days and meeting all other requirements         I spent 15 minutes directly with the patient during this visit

## 2022-01-02 NOTE — PATIENT INSTRUCTIONS
Knee Pain   WHAT YOU NEED TO KNOW:   Knee pain may start suddenly, or it may be a long-term problem  You may have pain on the side, front, or back of your knee  You may have knee stiffness and swelling  You may hear popping sounds or feel like your knee is giving way or locking up as you walk  You may feel pain when you sit, stand, walk, or climb up and down stairs  Knee pain can be caused by conditions such as obesity, inflammation, or strains or tears in ligaments or tendons  DISCHARGE INSTRUCTIONS:   Return to the emergency department if:   · Your pain is worse, even after treatment  · You cannot bend or straighten your leg completely  · The swelling around your knee does not go down even with treatment  · Your knee is painful and hot to the touch  Contact your healthcare provider if:   · You have questions or concerns about your condition or care  Medicines: You may need any of the following:  · NSAIDs  help decrease swelling and pain or fever  This medicine is available with or without a doctor's order  NSAIDs can cause stomach bleeding or kidney problems in certain people  If you take blood thinner medicine, always ask your healthcare provider if NSAIDs are safe for you  Always read the medicine label and follow directions  · Acetaminophen  decreases pain and fever  It is available without a doctor's order  Ask how much to take and how often to take it  Follow directions  Read the labels of all other medicines you are using to see if they also contain acetaminophen, or ask your doctor or pharmacist  Acetaminophen can cause liver damage if not taken correctly  Do not use more than 4 grams (4,000 milligrams) total of acetaminophen in one day  · Prescription pain medicine  may be given  Ask your healthcare provider how to take this medicine safely  Some prescription pain medicines contain acetaminophen   Do not take other medicines that contain acetaminophen without talking to your healthcare provider  Too much acetaminophen may cause liver damage  Prescription pain medicine may cause constipation  Ask your healthcare provider how to prevent or treat constipation  · Take your medicine as directed  Contact your healthcare provider if you think your medicine is not helping or if you have side effects  Tell him or her if you are allergic to any medicine  Keep a list of the medicines, vitamins, and herbs you take  Include the amounts, and when and why you take them  Bring the list or the pill bottles to follow-up visits  Carry your medicine list with you in case of an emergency  What you can do to manage your symptoms:   · Rest your knee so it can heal   Limit activities that increase your pain  Do low-impact exercises, such as walking or swimming  · Apply ice to help reduce swelling and pain  Use an ice pack, or put crushed ice in a plastic bag  Cover it with a towel before you apply it to your knee  Apply ice for 15 to 20 minutes every hour, or as directed  · Apply compression to help reduce swelling  Use a brace or bandage only as directed  · Elevate your knee to help decrease pain and swelling  Elevate your knee while you are sitting or lying down  Prop your leg on pillows to keep your knee above the level of your heart  · Prevent your knee from moving as directed  Your healthcare provider may put on a cast or splint  You may need to wear a leg brace to stabilize your knee  A leg brace can be adjusted to increase your range of motion as your knee heals  What you can do to prevent knee pain:   · Maintain a healthy weight  Extra weight increases your risk for knee pain  Ask your healthcare provider how much you should weigh  He or she can help you create a safe weight loss plan if you need to lose weight  · Exercise or train properly  Use the correct equipment for sports  Wear shoes that provide good support   Check your posture often as you exercise, play sports, or train for an event  This can help prevent stress and strain on your knees  Rest between sessions so you do not overwork your knees  Follow up with your healthcare provider within 24 hours or as directed: You may need follow-up treatments, such as steroid injections to decrease pain  Write down your questions so you remember to ask them during your visits  © Copyright [employer] 2021 Information is for End User's use only and may not be sold, redistributed or otherwise used for commercial purposes  All illustrations and images included in CareNotes® are the copyrighted property of [employer] D [employer] M [employer] Inc  or [facility] [employer]   The above information is an  only  It is not intended as medical advice for individual conditions or treatments  Talk to your doctor, nurse or pharmacist before following any medical regimen to see if it is safe and effective for you  Osteoarthritis   WHAT YOU NEED TO KNOW:   Osteoarthritis (OA) occurs when cartilage (tissue that cushions a joint) wears away slowly and causes the bones to rub together  OA is a long-term condition that often affects the hands, neck, lower back, knees, and hips  OA is also called arthrosis or degenerative joint disease  DISCHARGE INSTRUCTIONS:   Call your doctor or specialist if:   · You have severe pain  · You cannot move your joint  · You have a fever  · Your joint is red and tender  · You have questions or concerns about your condition or care  Medicines: You may need any of the following:  · Acetaminophen  decreases pain and fever  It is available without a doctor's order  Ask how much to take and how often to take it  Follow directions  Read the labels of all other medicines you are using to see if they also contain acetaminophen, or ask your doctor or pharmacist  Acetaminophen can cause liver damage if not taken correctly  Do not use more than 4 grams (4,000 milligrams) total of acetaminophen in one day  · NSAIDs , such as ibuprofen, help decrease swelling, pain, and fever  This medicine is available with or without a doctor's order  NSAIDs can cause stomach bleeding or kidney problems in certain people  If you take blood thinner medicine, always ask your healthcare provider if NSAIDs are safe for you  Always read the medicine label and follow directions  · Capsaicin cream  may help decrease pain in your joint  · Prescription pain medicine  may be given  Ask your healthcare provider how to take this medicine safely  Some prescription pain medicines contain acetaminophen  Do not take other medicines that contain acetaminophen without talking to your healthcare provider  Too much acetaminophen may cause liver damage  Prescription pain medicine may cause constipation  Ask your healthcare provider how to prevent or treat constipation  · Take your medicine as directed  Contact your healthcare provider if you think your medicine is not helping or if you have side effects  Tell him or her if you are allergic to any medicine  Keep a list of the medicines, vitamins, and herbs you take  Include the amounts, and when and why you take them  Bring the list or the pill bottles to follow-up visits  Carry your medicine list with you in case of an emergency  Follow up with your healthcare provider as directed:  Write down your questions so you remember to ask them during your visits  Go to physical therapy as directed:  A physical therapist teaches you exercises to help improve movement and strength, and to decrease pain in your joints  The exercises also help lower your risk for loss of function  A physical therapist may move an area with his or her hands  For example, he or she may move your leg in certain ways to treat osteoarthritis in your hip  Manage your symptoms:   · Stay active  Physical activity may reduce your pain and improve your ability to do daily activities  Avoid activities that cause pain   Ask your healthcare provider what type of exercise would be best for you  · Maintain a healthy weight  This helps decrease the strain on the joints in your back, hips, knees, ankles, and feet  Ask your healthcare provider what a healthy weight is for you  He or she can help you create a weight loss plan if you are overweight  · Use heat or ice on your joints as directed  Heat and ice help decrease pain, swelling, and muscle spasms  For heat, use a heating pad on a low setting for 20 minutes, or take a warm bath  For ice, use an ice pack, or put crushed ice in a plastic bag  Cover it with a towel before you place it on your joint  Use ice for 15 minutes every hour  · Massage the muscles around the joint  Massage helps relieve pain and stiffness  Your healthcare provider or a physical therapist can show you how to do this  If you have hip OA, another person may need to help you massage the area  · Use a cane, crutches, or a walker if directed  These help protect and relieve pressure on your ankle, knee, and hip joints  You may also be prescribed shoe inserts to decrease pressure in your joints  · Wear flat or low-heeled shoes  This will help decrease pain and reduce pressure on your ankle, knee, and hip joints  © Copyright [employer] 2021 Information is for End User's use only and may not be sold, redistributed or otherwise used for commercial purposes  All illustrations and images included in CareNotes® are the copyrighted property of A D A M , Inc  or [facility] [employer]   The above information is an  only  It is not intended as medical advice for individual conditions or treatments  Talk to your doctor, nurse or pharmacist before following any medical regimen to see if it is safe and effective for you

## 2022-01-02 NOTE — ASSESSMENT & PLAN NOTE
Secondary to mechanical fall today  Pain control  Patient has no complication from previous surgery  Currently No active cardiac disease  Patient at acceptable risk for surgery  Awaiting orthopedic evaluation

## 2022-01-02 NOTE — ED ATTENDING ATTESTATION
Fish Castellano DO, saw and evaluated the patient  I have discussed the patient with the resident/non-physician practitioner and agree with the resident's/non-physician practitioner's findings, Plan of Care, and MDM as documented in the resident's/non-physician practitioner's note, except where noted  All available labs and Radiology studies were reviewed  At this point I agree with the current assessment done in the Emergency Department  I have conducted an independent evaluation of this patient including a focused history and a physical exam     ED Note - Dandy Hanks 67 y o  male MRN: [id number]  Unit/Bed#: [facility] Encounter: [id number]    History of Present Illness   HPI  Dandy Hanks is a 67 y o  male who presents for evaluation after a mechanical fall from his wheelchair  Patient with severe dementia and pending nursing home placement  Lives at home with wife who is his primary caretaker  Patient was in a wheelchair today when wife stepped out of the room for a short period of time and returned upon hearing him fall  Patient very impulsive  Patient confused and not providing any HPI or review of systems  Patient did strike his head and there are 2 approximately 1 cm lacerations on to the left occipital region  No other obvious signs of trauma  REVIEW OF SYSTEMS  See HPI for further details  12 systems reviewed and otherwise negative except as noted     Historical Information     PAST MEDICAL HISTORY  Past Medical History:   Diagnosis Date    Alzheimer disease ([address] )     Anemia     BPH (benign prostatic hyperplasia)     Dementia (HCC)     Hyperlipidemia     Hypertension     Memory loss        FAMILY HISTORY  Family History   Problem Relation Age of Onset    Lung disease Father         coal workers' pneumoconiosis       SOCIAL HISTORY  Social History     Socioeconomic History    Marital status: /Civil Union     Spouse name: None    Number of children: None    Years of education: None    Highest education level: None   Occupational History    None   Tobacco Use    Smoking status: Former Smoker     Packs/day: 0 25     Years: 10 00     Pack years: 2 50    Smokeless tobacco: Never Used    Tobacco comment: 1PPW   Vaping Use    Vaping Use: Never used   Substance and Sexual Activity    Alcohol use: Not Currently    Drug use: Not Currently    Sexual activity: Not Currently   Other Topics Concern    None   Social History Narrative    History of Holter Monitor: 2019    History of ECHO: 2019    · Most recent tobacco use screenin2019      · Do you currently or have you served in [employer]:   No      Social Determinants of Health     Financial Resource Strain: Not on file   Food Insecurity: Not on file   Transportation Needs: Not on file   Physical Activity: Not on file   Stress: Not on file   Social Connections: Not on file   Intimate Partner Violence: Not on file   Housing Stability: Not on file       SURGICAL HISTORY  Past Surgical History:   Procedure Laterality Date    COLONOSCOPY  2019    5 years    IL OPEN RX FEMUR FX+INTRAMED JOSE Right 2021    Procedure: INSERTION LONG NAIL IM FEMUR ANTEGRADE (TROCHANTERIC);   Surgeon: Frantz Ventura DO;  Location[facility];  Service: Orthopedics    REPLACEMENT TOTAL KNEE Right 2019     Meds/Allergies     CURRENT MEDICATIONS    Current Facility-Administered Medications:     midazolam (VERSED) injection 2 mg, 2 mg, Intravenous, Once, Marisol Bennett MD    Current Outpatient Medications:     acetaminophen (TYLENOL) 325 mg tablet, Take 2 tablets (650 mg total) by mouth every 6 (six) hours as needed for mild pain, Disp: 20 tablet, Rfl: 0    acetaminophen-codeine (TYLENOL #3) 300-30 mg per tablet, Take 1 tablet by mouth every 12 (twelve) hours for 10 days, Disp: 20 tablet, Rfl: 0    busPIRone (BUSPAR) 5 mg tablet, Take 1 tablet (5 mg total) by mouth 2 (two) times a day, Disp: 90 tablet, Rfl: 0    cyanocobalamin 1,000 mcg/mL, Inject 1 mL (1,000 mcg total) into a muscle every 30 (thirty) days for 12 doses, Disp: 12 mL, Rfl: 1    donepezil (ARICEPT) 10 mg tablet, Take 1 tablet (10 mg total) by mouth daily, Disp: 90 tablet, Rfl: 1    escitalopram (LEXAPRO) 10 mg tablet, Take 1 tablet (10 mg total) by mouth daily, Disp: 90 tablet, Rfl: 0    fluticasone (FLONASE) 50 mcg/act nasal spray, 1 spray into each nostril daily, Disp: 18 mL, Rfl: 0    QUEtiapine (SEROquel) 25 mg tablet, Take 1 tablet (25 mg total) by mouth daily at bedtime, Disp: 90 tablet, Rfl: 0    senna-docusate sodium (SENOKOT S) 8 6-50 mg per tablet, Take 1 tablet by mouth daily at bedtime, Disp: 10 tablet, Rfl: 0    simvastatin (ZOCOR) 20 mg tablet, Take 1 tablet (20 mg total) by mouth daily at bedtime, Disp: 90 tablet, Rfl: 1    Skin Protectants, Misc  ([employer] Skin Protectant) PSTE, Apply to sacral wound daily, Disp: , Rfl: 0    Syringe, Disposable, (2-3CC SYRINGE) 3 ML MISC, Use every 30 (thirty) days for 12 doses Please provide needles  And syringes for B12 injections, Disp: 12 each, Rfl: 1    tamsulosin (FLOMAX) 0 4 mg, Take 1 capsule (0 4 mg total) by mouth daily, Disp: 90 capsule, Rfl: 1  (Not in a hospital admission)      ALLERGIES  No Known Allergies  Objective     PHYSICAL EXAM    VITAL SIGNS: Blood pressure 140/63, pulse 89, resp  rate 20, SpO2 98 %      Constitutional:  Appears well developed and well nourished, no acute distress, non-toxic appearance   Eyes:  PERRL, EOMI, conjunctivae pink, sclerae non-icteric    HENT:  Normocephalic, 2 superficial lacerations approximately 1 cm each noted to the left occipital region, no rhinorrhea, mucous membranes moist   Neck: normal range of motion, no tenderness, supple   Respiratory:  No respiratory distress, normal breath sounds   Cardiovascular:  Normal rate, normal rhythm    GI:  Soft, no tenderness, non-distended  :  No CVAT, no flank ecchymosis  Musculoskeletal:  No swelling or edema, no tenderness, no deformities  Integument:  Pink, warm, dry, Well hydrated, no rash, no erythema, no bullae   Lymphatic:  No cervical/ tonsillar/ submandibular lymphadenopathy noted   Neurologic:  Awake, Alert & confused, no obvious acute focal neurological deficits, motor function intact- per wife, patient is at baseline  Psychiatric:  Intermittent agitation       ED COURSE and MDM:    Assessment/Plan   Assessment:  Sacha Blackman is a 67 y o  male presents for evaluation after a mechanical fall  Plan:  CT head, C-spine and pelvis  Symptom management  Disposition as appropriate  CRITICAL CARE TIME:   0      Portions of the record may have been created with voice recognition software  Occasional wrong word or "sound a like" substitutions may have occurred due to the inherent limitations of voice recognition software       ED Provider  Electronically Signed by

## 2022-01-02 NOTE — ED PROVIDER NOTES
History  Chief Complaint   Patient presents with   Vanesa Zuluaga Fall     pt presents by [facility] ambulance s/p unwitnessed fall out of wheelchair  +headstrike to occiput  neg LOC, neg thinners  hx dementia      66 yo M w/ severe dementia presents after mechanical fall earlier today  History provided by spouse  According to wife of patient he attempted to get up out of wheelchair when he fell backward striking his head on a bed rail  Denies LOC or rhythmic movements  Pt was acting normally this morning before fall  Eating and drinking normally  Pt at neurologic baseline  No thinners or ASA  Prior to Admission Medications   Prescriptions Last Dose Informant Patient Reported? Taking? QUEtiapine (SEROquel) 25 mg tablet   No No   Sig: Take 1 tablet (25 mg total) by mouth daily at bedtime   Skin Protectants, Misc   (Calazime Skin Protectant) PSTE   No No   Sig: Apply to sacral wound daily   Syringe, Disposable, (2-3CC SYRINGE) 3 ML MISC   No No   Sig: Use every 30 (thirty) days for 12 doses Please provide needles  And syringes for B12 injections   acetaminophen (TYLENOL) 325 mg tablet   No No   Sig: Take 2 tablets (650 mg total) by mouth every 6 (six) hours as needed for mild pain   acetaminophen-codeine (TYLENOL #3) 300-30 mg per tablet   No No   Sig: Take 1 tablet by mouth every 12 (twelve) hours for 10 days   busPIRone (BUSPAR) 5 mg tablet   No No   Sig: Take 1 tablet (5 mg total) by mouth 2 (two) times a day   cyanocobalamin 1,000 mcg/mL   No No   Sig: Inject 1 mL (1,000 mcg total) into a muscle every 30 (thirty) days for 12 doses   donepezil (ARICEPT) 10 mg tablet   No No   Sig: Take 1 tablet (10 mg total) by mouth daily   escitalopram (LEXAPRO) 10 mg tablet   No No   Sig: Take 1 tablet (10 mg total) by mouth daily   fluticasone (FLONASE) 50 mcg/act nasal spray   No No   Si spray into each nostril daily   senna-docusate sodium (SENOKOT S) 8 6-50 mg per tablet   No No   Sig: Take 1 tablet by mouth daily at bedtime simvastatin (ZOCOR) 20 mg tablet   No No   Sig: Take 1 tablet (20 mg total) by mouth daily at bedtime   tamsulosin (FLOMAX) 0 4 mg   No No   Sig: Take 1 capsule (0 4 mg total) by mouth daily      Facility-Administered Medications: None       Past Medical History:   Diagnosis Date    [facility] disease ([facility] [address] )     Anemia     BPH (benign prostatic hyperplasia)     [facility]     Hyperlipidemia     Hypertension     Memory loss        Past Surgical History:   Procedure Laterality Date    COLONOSCOPY  11/19/2019    5 years    [place] OPEN RX FEMUR FX+INTRAMED JOSE Right 11/5/2021    Procedure: INSERTION LONG NAIL IM FEMUR ANTEGRADE (TROCHANTERIC); Surgeon: Cielo Nava DO;  [facility];  Service: Orthopedics    REPLACEMENT TOTAL KNEE Right 06/19/2019       Family History   Problem Relation Age of Onset    Lung disease Father         coal workers' pneumoconiosis     I have reviewed and agree with the history as documented      E-Cigarette/Vaping    E-Cigarette Use Never User      E-Cigarette/Vaping Substances    Nicotine No     THC No     CBD No     Flavoring No     Other No     Unknown No      Social History     Tobacco Use    Smoking status: Former Smoker     Packs/day: 0 25     Years: 10 00     Pack years: 2 50    Smokeless tobacco: Never Used    Tobacco comment: 1PPW   Vaping Use    Vaping Use: Never used   Substance Use Topics    Alcohol use: Not Currently    Drug use: Not Currently        Review of Systems   Unable to perform ROS: Dementia       Physical Exam  ED Triage Vitals   Temperature Pulse Respirations Blood Pressure SpO2   01/02/22 1519 01/02/22 1418 01/02/22 1418 01/02/22 1418 01/02/22 1418   97 7 °F (36 5 °C) 89 20 140/63 98 %      Temp Source Heart Rate Source Patient Position - Orthostatic VS BP Location FiO2 (%)   01/02/22 1519 01/02/22 1418 -- -- --   Oral Monitor         Pain Score       --                    Orthostatic Vital Signs  Vitals:    01/02/22 1418 01/02/22 1715 BP: 140/63 (!) 141/112   Pulse: 89 86       Physical Exam  Vitals and nursing note reviewed  Constitutional:       General: He is in acute distress  Appearance: He is well-developed and normal weight  He is not ill-appearing, toxic-appearing or diaphoretic  Comments: Severely demented  Does not follow commands  HENT:      Head: Normocephalic  Comments: No signs of basilar skull fracture  No blood in mouth or broken teeth  Pt has 2 cm and a 1 cm superficial linear lacerations on the left occiput  Not actively bleeding  No surrounding erythema or ecchymosis  Closed w/ skin glue  Right Ear: External ear normal       Left Ear: External ear normal       Nose: Nose normal       Mouth/Throat:      Mouth: Mucous membranes are moist       Pharynx: Oropharynx is clear  Eyes:      General:         Right eye: No discharge  Left eye: No discharge  Conjunctiva/sclera: Conjunctivae normal       Pupils: Pupils are equal, round, and reactive to light  Cardiovascular:      Rate and Rhythm: Normal rate and regular rhythm  Pulses: Normal pulses  Heart sounds: No murmur heard  Pulmonary:      Effort: Pulmonary effort is normal  No respiratory distress  Breath sounds: Normal breath sounds  No wheezing or rales  Abdominal:      General: Abdomen is flat  Bowel sounds are normal  There is no distension  Palpations: Abdomen is soft  Tenderness: There is no guarding  Musculoskeletal:         General: Normal range of motion  Cervical back: Normal range of motion and neck supple  No rigidity  Right lower leg: No edema  Left lower leg: No edema  Comments: Pt rubbing L thigh  No limb deformity or rotation  Skin:     General: Skin is warm and dry  Capillary Refill: Capillary refill takes less than 2 seconds  Findings: No bruising or erythema  Neurological:      Mental Status: Mental status is at baseline     Psychiatric:         Attention and Perception: He is inattentive  Speech: Speech normal          Behavior: Behavior is agitated  Cognition and Memory: Cognition is impaired  Comments: Confused at baseline  ED Medications  Medications   acetaminophen (TYLENOL) tablet 975 mg (975 mg Oral Given 1/2/22 1757)   oxyCODONE (ROXICODONE) IR tablet 5 mg (5 mg Oral Given 1/2/22 1757)   OLANZapine (ZyPREXA) tablet 5 mg (5 mg Oral Given 1/2/22 1715)       Diagnostic Studies  Results Reviewed     Procedure Component Value Units Date/Time    CBC and differential [[id number]]  (Abnormal) Collected: 01/02/22 1808    Lab Status: Final result Specimen: Blood from Arm, Left Updated: 01/02/22 1824     WBC 9 69 Thousand/uL      RBC 3 73 Million/uL      Hemoglobin 10 8 g/dL      Hematocrit 32 8 %      MCV 88 fL      MCH 29 0 pg      MCHC 32 9 g/dL      RDW 14 2 %      MPV 10 1 fL      Platelets 213 Thousands/uL      nRBC 0 /100 WBCs      Neutrophils Relative 79 %      Immat GRANS % 0 %      Lymphocytes Relative 14 %      Monocytes Relative 6 %      Eosinophils Relative 1 %      Basophils Relative 0 %      Neutrophils Absolute 7 57 Thousands/µL      Immature Grans Absolute 0 03 Thousand/uL      Lymphocytes Absolute 1 38 Thousands/µL      Monocytes Absolute 0 58 Thousand/µL      Eosinophils Absolute 0 09 Thousand/µL      Basophils Absolute 0 04 Thousands/µL     Basic metabolic panel [[id number]] Collected: 01/02/22 1808    Lab Status: In process Specimen: Blood from Arm, Left Updated: 01/02/22 1819    COVID/FLU [[id number]] Collected: 01/02/22 1811    Lab Status: In process Specimen: Nasopharyngeal Swab Updated: 01/02/22 1819    Protime-INR [[id number]] Collected: 01/02/22 1808    Lab Status: In process Specimen: Blood from Arm, Left Updated: 01/02/22 1819    APTT [[id number]] Collected: 01/02/22 1808    Lab Status:  In process Specimen: Blood from Arm, Left Updated: 01/02/22 1819                 XR hip/pelv 2-3 vws left if performed   Final Result by Pallavi Leon MD (01/02 1759)      Impacted left femoral neck fracture unchanged from the recent prior CT  Workstation performed: [id number]         XR femur 2 views LEFT   Final Result by Pallavi Leon MD (01/02 1825)      Impacted left femoral neck fracture  Remainder of femur intact            Workstation performed: [id number]         CT head without contrast   Final Result by Sena Bustos MD (01/02 1535)      No acute intracranial abnormality  New small scalp hematoma in left parietal region  Please see same-day CT cervical spine without contrast for further evaluation  Workstation performed: [id number]         CT spine cervical without contrast   Final Result by Sena Bustos MD (01/02 1544)      No cervical spine fracture or traumatic subluxation  Increase kyphotic curvature of upper cervical spine may be due to patient positioning or muscle spasm  Workstation performed: [id number]         CT pelvis wo contrast   Final Result by Sena Bustos MD (01/02 1553)      New acute impacted transcervical fracture of left femoral neck  Partially imaged healing internally fixed intertrochanteric fracture of right femoral neck  Additional chronic/incidental findings as detailed above  The study was marked in [facility] for immediate notification        Workstation performed: [id number]               Procedures  Procedures      ED Course                                       MDM  Number of Diagnoses or Management Options  Alzheimer's disease ([facility] [address] ): established and worsening  Fall, initial encounter: new and requires workup  Fracture of unspecified part of neck of left femur, initial encounter for closed fracture [facility]): new and requires workup  Superficial laceration of skin: new and requires workup  Diagnosis management comments: Impression: mechanical fall w/ headstrike, pt at baseline according to spouse, no thinners or LOC, superficial lacerations x2 on occiput  Plan: CT head, C-spine, CT pelvis, skin glue for lacerations, spouse of pt comfortable taking pt home if workup negative  Workup significant for L femoral neck fx  Ortho consulted  Admit to SLIM  Amount and/or Complexity of Data Reviewed  Clinical lab tests: ordered and reviewed  Tests in the radiology section of CPT®: ordered and reviewed  Obtain history from someone other than the patient: yes  Review and summarize past medical records: yes  Independent visualization of images, tracings, or specimens: yes    Risk of Complications, Morbidity, and/or Mortality  Presenting problems: moderate  Diagnostic procedures: low  Management options: low    Patient Progress  Patient progress: stable      Disposition  Final diagnoses:   Fall, initial encounter   Superficial laceration of skin   Alzheimer's disease ([facility] )   Fracture of unspecified part of neck of left femur, initial encounter for closed fracture [facility])     Time reflects when diagnosis was documented in both MDM as applicable and the Disposition within this note     Time User Action Codes Description Comment    1/2/2022  3:28 PM Lashay Mary Add [U36  POKS] Fall, initial encounter     1/2/2022  3:28 PM Aria Delacruz  8XXA] Superficial laceration of skin     1/2/2022  4:15 PM Ira Daniela Add [G30 9,  F02 80] Alzheimer's disease ([facility] )     1/2/2022  4:15 PM Iraetelvina Yuns Add [[id number]] Fracture of unspecified part of neck of left femur, initial encounter for closed fracture [facility])       ED Disposition     ED Disposition Condition Date/Time Comment    Admit Stable Sun Jan 2, 2022  6:06 PM Case was discussed with Dr Promise Felton and the patient's admission status was agreed to be Admission Status: inpatient status to the service of Dr Promise Felton          Follow-up Information    None         Patient's Medications   Discharge Prescriptions    No medications on file     No discharge procedures on file  PDMP Review       Value Time User    PDMP Reviewed  Yes 11/10/2021  3:48 PM [address] [place], [place]           ED Provider  Attending physically available and evaluated Kristiedaniela Geronimo GARRETT managed the patient along with the ED Attending      Electronically Signed by         Brenda Barger MD  01/02/22 5173

## 2022-01-02 NOTE — ASSESSMENT & PLAN NOTE
Chronic history of Alzheimer's disease with baseline history of minimal verbal responses and agitation at night    Continue home meds  Continue supportive care

## 2022-01-02 NOTE — H&P
[address] [facility]  H&P- Juliane Escobedo 1945, 67 y o  male MRN: [id number]  Unit/Bed#: ED 21 Encounter: [id number]  Primary Care Provider: HORACIO Levin   Date and time admitted to hospital: 1/2/2022  2:07 PM    * Closed fracture of neck of left femur [facility])  Assessment & Plan  Secondary to mechanical fall today  Pain control  Patient has no complication from previous surgery  Currently No active cardiac disease  Patient at acceptable risk for surgery  Awaiting orthopedic evaluation    Ambulatory dysfunction  Assessment & Plan  Previous history of right hip fracture status post ORIF on 11/5/2021  PT OT eval after evaluated by orthopedics    Alzheimer's disease ([facility] [address] )  Assessment & Plan  Chronic history of Alzheimer's disease with baseline history of minimal verbal responses and agitation at night  Continue home meds  Continue supportive care    VTE Pharmacologic Prophylaxis: VTE Score: 10 High Risk (Score >/= 5) - Pharmacological DVT Prophylaxis Ordered: heparin  Sequential Compression Devices Ordered  Code Status:  DNR DNI  Discussion with family: Updated  (wife) at bedside  Anticipated Length of Stay: Patient will be admitted on an inpatient basis with an anticipated length of stay of greater than 2 midnights secondary to Above  Total Time for Visit, including Counseling / Coordination of Care: 60 minutes Greater than 50% of this total time spent on direct patient counseling and coordination of care      Chief Complaint:   Fall and left hip pain    History of Present Illness:    Juliane Escobedo is a 67 y o  male with a PMH of Alzheimer's disease and hyperlipidemia who presents with mechanical fall from wheelchair, and start to complain of left hip pain and inability to bear weight  Patient's wife at bedside, patient's baseline is  minimal verbal responses  No recent illness, no fever chills, no nausea vomiting or diarrhea  No chest pain or shortness of breath  Recent right hip fracture due to fall status post ORIF on 11/5/2021     Patient was evaluated in the emergency room, was found to have acute left femoral neck fracture        Review of Systems:  Review of Systems   Unable to perform ROS: Dementia       Past Medical and Surgical History:   Past Medical History:   Diagnosis Date    [facility] disease ([facility] [address] )     Anemia     BPH (benign prostatic hyperplasia)     Dementia ([facility] [address] )     Hyperlipidemia     Hypertension     Memory loss        Past Surgical History:   Procedure Laterality Date    COLONOSCOPY  11/19/2019    5 years    [place] OPEN RX FEMUR FX+INTRAMED JOSE Right 11/5/2021    Procedure: INSERTION LONG NAIL IM FEMUR ANTEGRADE (TROCHANTERIC); Surgeon: Kalpesh Tanner DO;  [facility];  Service: Orthopedics    REPLACEMENT TOTAL KNEE Right 06/19/2019       Meds/Allergies:  Prior to Admission medications    Medication Sig Start Date End Date Taking?  Authorizing Provider   acetaminophen (TYLENOL) 325 mg tablet Take 2 tablets (650 mg total) by mouth every 6 (six) hours as needed for mild pain 12/2/21   Regina Olivier PA-C   acetaminophen-codeine (TYLENOL #3) 300-30 mg per tablet Take 1 tablet by mouth every 12 (twelve) hours for 10 days 12/30/21 1/9/22  HORACIO Chan   busPIRone (BUSPAR) 5 mg tablet Take 1 tablet (5 mg total) by mouth 2 (two) times a day 12/11/21   HORACIO Chan   cyanocobalamin 1,000 mcg/mL Inject 1 mL (1,000 mcg total) into a muscle every 30 (thirty) days for 12 doses 12/17/21 11/13/22  HORACIO Chan   donepezil (ARICEPT) 10 mg tablet Take 1 tablet (10 mg total) by mouth daily 12/11/21   HORACIO Chan   escitalopram (LEXAPRO) 10 mg tablet Take 1 tablet (10 mg total) by mouth daily 12/30/21 3/30/22  HORACIO Chan   fluticasone [facility]) 50 mcg/act nasal spray 1 spray into each nostril daily 12/8/21 1/7/22  HORACIO Chan   QUEtiapine (SEROquel) 25 mg tablet Take 1 tablet (25 mg total) by mouth daily at bedtime 12/31/21 3/31/22  HORACIO Messer   senna-docusate sodium (SENOKOT S) 8 6-50 mg per tablet Take 1 tablet by mouth daily at bedtime 12/11/21   HORACIO Messer   simvastatin (ZOCOR) 20 mg tablet Take 1 tablet (20 mg total) by mouth daily at bedtime 12/11/21 3/11/22  HORACIO Messer   Skin Protectants, Misc  (Calazime Skin Protectant) PSTE Apply to sacral wound daily 12/2/21   Kristan Nunez PA-C   Syringe, Disposable, (2-3CC SYRINGE) 3 ML MISC Use every 30 (thirty) days for 12 doses Please provide needles  And syringes for B12 injections 12/17/21 11/13/22  HORACIO Messer   tamsulosin (FLOMAX) 0 4 mg Take 1 capsule (0 4 mg total) by mouth daily 12/11/21 3/11/22  HORACIO Messer     I have reviewed home medications with patient family member  Allergies: No Known Allergies    Social History:  Marital Status: /Civil Union     Substance Use History:   Social History     Substance and Sexual Activity   Alcohol Use Not Currently     Social History     Tobacco Use   Smoking Status Former Smoker    Packs/day: 0 25    Years: 10 00    Pack years: 2 50   Smokeless Tobacco Never Used   Tobacco Comment    1PPW     Social History     Substance and Sexual Activity   Drug Use Not Currently       Family History:    Family History   Problem Relation Age of Onset    Lung disease Father         coal workers' pneumoconiosis     Physical Exam:     Vitals:   Blood Pressure: (!) 141/112 (01/02/22 1715)  Pulse: 86 (01/02/22 1715)  Temperature: 97 7 °F (36 5 °C) (01/02/22 1519)  Temp Source: Oral (01/02/22 1519)  Respirations: (!) 30 (01/02/22 1715)  SpO2: 98 % (01/02/22 1715)    Physical Exam  Vitals reviewed  Constitutional:       Appearance: Normal appearance  He is not ill-appearing  HENT:      Head: Normocephalic and atraumatic  Mouth/Throat:      Mouth: Mucous membranes are moist       Pharynx: No oropharyngeal exudate  Eyes:      General: No scleral icterus  Extraocular Movements: Extraocular movements intact  Cardiovascular:      Rate and Rhythm: Normal rate and regular rhythm  Pulses: Normal pulses  Heart sounds: Normal heart sounds  No murmur heard  Pulmonary:      Effort: Pulmonary effort is normal  No respiratory distress  Breath sounds: Normal breath sounds  No wheezing  Abdominal:      General: Bowel sounds are normal  There is no distension  Palpations: Abdomen is soft  Tenderness: There is no abdominal tenderness  Musculoskeletal:         General: Normal range of motion  Cervical back: Normal range of motion and neck supple  Right lower leg: No edema  Left lower leg: No edema  Skin:     General: Skin is warm and dry  Neurological:      General: No focal deficit present  Mental Status: He is alert  Comments: Pleasantly demented  Minimally verbal         Additional Data:     Lab Results:             pending                Imaging:  Reviewed  CT head without contrast   Final Result by Steve Brantley MD (01/02 1534)      No acute intracranial abnormality  New small scalp hematoma in left parietal region  Please see same-day CT cervical spine without contrast for further evaluation  Workstation performed: [id number]         CT spine cervical without contrast   Final Result by Steve Brantley MD (01/02 1542)      No cervical spine fracture or traumatic subluxation  Increase kyphotic curvature of upper cervical spine may be due to patient positioning or muscle spasm  Workstation performed: [id number]         CT pelvis wo contrast   Final Result by Steve Brantley MD (01/02 1558)      New acute impacted transcervical fracture of left femoral neck  Partially imaged healing internally fixed intertrochanteric fracture of right femoral neck  Additional chronic/incidental findings as detailed above        The study was marked in [employer] for immediate notification  Workstation performed: [id number]         XR femur 2 views LEFT    (Results Pending)   XR hip/pelv 2-3 vws left if performed    (Results Pending)       EKG and Other Studies Reviewed on Admission:   · yes    ** Please Note: This note has been constructed using a voice recognition system   **

## 2022-01-03 ENCOUNTER — ANESTHESIA EVENT (INPATIENT)
Dept: PERIOP | Facility: HOSPITAL | Age: 77
DRG: 480 | End: 2022-01-03
Payer: MEDICARE

## 2022-01-03 ENCOUNTER — PATIENT OUTREACH (OUTPATIENT)
Dept: CASE MANAGEMENT | Facility: HOSPITAL | Age: 77
End: 2022-01-03

## 2022-01-03 ENCOUNTER — ANESTHESIA (INPATIENT)
Dept: PERIOP | Facility: HOSPITAL | Age: 77
DRG: 480 | End: 2022-01-03
Payer: MEDICARE

## 2022-01-03 LAB
ANION GAP SERPL CALCULATED.3IONS-SCNC: 6 MMOL/L (ref 4–13)
ATRIAL RATE: 288 BPM
BASOPHILS # BLD AUTO: 0.03 THOUSANDS/ΜL (ref 0–0.1)
BASOPHILS NFR BLD AUTO: 1 % (ref 0–1)
BUN SERPL-MCNC: 13 MG/DL (ref 5–25)
CALCIUM SERPL-MCNC: 8.8 MG/DL (ref 8.3–10.1)
CHLORIDE SERPL-SCNC: 108 MMOL/L (ref 100–108)
CO2 SERPL-SCNC: 22 MMOL/L (ref 21–32)
CREAT SERPL-MCNC: 0.56 MG/DL (ref 0.6–1.3)
EOSINOPHIL # BLD AUTO: 0.09 THOUSAND/ΜL (ref 0–0.61)
EOSINOPHIL NFR BLD AUTO: 1 % (ref 0–6)
ERYTHROCYTE [DISTWIDTH] IN BLOOD BY AUTOMATED COUNT: 14.1 % (ref 11.6–15.1)
GFR SERPL CREATININE-BSD FRML MDRD: 99 ML/MIN/1.73SQ M
GLUCOSE SERPL-MCNC: 101 MG/DL (ref 65–140)
HCT VFR BLD AUTO: 33.2 % (ref 36.5–49.3)
HGB BLD-MCNC: 10.5 G/DL (ref 12–17)
IMM GRANULOCYTES # BLD AUTO: 0.03 THOUSAND/UL (ref 0–0.2)
IMM GRANULOCYTES NFR BLD AUTO: 1 % (ref 0–2)
LYMPHOCYTES # BLD AUTO: 0.88 THOUSANDS/ΜL (ref 0.6–4.47)
LYMPHOCYTES NFR BLD AUTO: 14 % (ref 14–44)
MAGNESIUM SERPL-MCNC: 2.1 MG/DL (ref 1.6–2.6)
MCH RBC QN AUTO: 28.6 PG (ref 26.8–34.3)
MCHC RBC AUTO-ENTMCNC: 31.6 G/DL (ref 31.4–37.4)
MCV RBC AUTO: 91 FL (ref 82–98)
MONOCYTES # BLD AUTO: 0.35 THOUSAND/ΜL (ref 0.17–1.22)
MONOCYTES NFR BLD AUTO: 6 % (ref 4–12)
NEUTROPHILS # BLD AUTO: 5.02 THOUSANDS/ΜL (ref 1.85–7.62)
NEUTS SEG NFR BLD AUTO: 77 % (ref 43–75)
NRBC BLD AUTO-RTO: 0 /100 WBCS
P AXIS: 89 DEGREES
PLATELET # BLD AUTO: 233 THOUSANDS/UL (ref 149–390)
PMV BLD AUTO: 10.5 FL (ref 8.9–12.7)
POTASSIUM SERPL-SCNC: 3.9 MMOL/L (ref 3.5–5.3)
QRS AXIS: 76 DEGREES
QRSD INTERVAL: 82 MS
QT INTERVAL: 366 MS
QTC INTERVAL: 483 MS
RBC # BLD AUTO: 3.67 MILLION/UL (ref 3.88–5.62)
SODIUM SERPL-SCNC: 136 MMOL/L (ref 136–145)
T WAVE AXIS: 99 DEGREES
VENTRICULAR RATE: 105 BPM
WBC # BLD AUTO: 6.4 THOUSAND/UL (ref 4.31–10.16)

## 2022-01-03 PROCEDURE — 99223 1ST HOSP IP/OBS HIGH 75: CPT | Performed by: ORTHOPAEDIC SURGERY

## 2022-01-03 PROCEDURE — 85025 COMPLETE CBC W/AUTO DIFF WBC: CPT | Performed by: INTERNAL MEDICINE

## 2022-01-03 PROCEDURE — 80048 BASIC METABOLIC PNL TOTAL CA: CPT | Performed by: INTERNAL MEDICINE

## 2022-01-03 PROCEDURE — 83735 ASSAY OF MAGNESIUM: CPT | Performed by: INTERNAL MEDICINE

## 2022-01-03 PROCEDURE — 99222 1ST HOSP IP/OBS MODERATE 55: CPT | Performed by: INTERNAL MEDICINE

## 2022-01-03 PROCEDURE — NC001 PR NO CHARGE: Performed by: ORTHOPAEDIC SURGERY

## 2022-01-03 PROCEDURE — 99232 SBSQ HOSP IP/OBS MODERATE 35: CPT | Performed by: PHYSICIAN ASSISTANT

## 2022-01-03 PROCEDURE — 93010 ELECTROCARDIOGRAM REPORT: CPT | Performed by: INTERNAL MEDICINE

## 2022-01-03 RX ORDER — LIDOCAINE 50 MG/G
1 PATCH TOPICAL DAILY
Status: DISCONTINUED | OUTPATIENT
Start: 2022-01-03 | End: 2022-01-15 | Stop reason: HOSPADM

## 2022-01-03 RX ORDER — FLUTICASONE PROPIONATE 50 MCG
SPRAY, SUSPENSION (ML) NASAL
Qty: 16 ML | Refills: 2 | Status: SHIPPED | OUTPATIENT
Start: 2022-01-03

## 2022-01-03 RX ORDER — SODIUM CHLORIDE, SODIUM LACTATE, POTASSIUM CHLORIDE, CALCIUM CHLORIDE 600; 310; 30; 20 MG/100ML; MG/100ML; MG/100ML; MG/100ML
100 INJECTION, SOLUTION INTRAVENOUS CONTINUOUS
Status: DISCONTINUED | OUTPATIENT
Start: 2022-01-03 | End: 2022-01-04

## 2022-01-03 RX ADMIN — BUSPIRONE HYDROCHLORIDE 5 MG: 5 TABLET ORAL at 22:13

## 2022-01-03 RX ADMIN — ESCITALOPRAM OXALATE 10 MG: 10 TABLET ORAL at 08:16

## 2022-01-03 RX ADMIN — ACETAMINOPHEN 975 MG: 325 TABLET, FILM COATED ORAL at 05:30

## 2022-01-03 RX ADMIN — SODIUM CHLORIDE, SODIUM LACTATE, POTASSIUM CHLORIDE, AND CALCIUM CHLORIDE 100 ML/HR: .6; .31; .03; .02 INJECTION, SOLUTION INTRAVENOUS at 23:38

## 2022-01-03 RX ADMIN — OXYCODONE HYDROCHLORIDE 5 MG: 5 TABLET ORAL at 05:39

## 2022-01-03 RX ADMIN — HEPARIN SODIUM 5000 UNITS: 5000 INJECTION INTRAVENOUS; SUBCUTANEOUS at 22:13

## 2022-01-03 RX ADMIN — QUETIAPINE FUMARATE 25 MG: 25 TABLET ORAL at 22:13

## 2022-01-03 RX ADMIN — ACETAMINOPHEN 975 MG: 325 TABLET, FILM COATED ORAL at 22:12

## 2022-01-03 RX ADMIN — SENNOSIDES AND DOCUSATE SODIUM 1 TABLET: 50; 8.6 TABLET ORAL at 22:13

## 2022-01-03 RX ADMIN — DONEPEZIL HYDROCHLORIDE 10 MG: 10 TABLET ORAL at 08:16

## 2022-01-03 RX ADMIN — BUSPIRONE HYDROCHLORIDE 5 MG: 5 TABLET ORAL at 08:16

## 2022-01-03 NOTE — PROGRESS NOTES
Progress Note - Orthopedics   Sheng Alatorre 67 y o  male MRN: [id number]  Unit/Bed#: [facility] 602-01      Subjective:    No complaints  Denies fevers chills, CP, SOB    Labs:  0   Lab Value Date/Time    HCT 32 8 (L) 01/02/2022 1808    HCT 33 0 (L) 12/01/2021 0525    HCT 34 7 (L) 11/30/2021 0930    HGB 10 8 (L) 01/02/2022 1808    HGB 10 2 (L) 12/01/2021 0525    HGB 10 7 (L) 11/30/2021 0930    INR 0 98 01/02/2022 1808    WBC 9 69 01/02/2022 1808    WBC 7 09 12/01/2021 0525    WBC 6 04 11/30/2021 0930       Meds:    Current Facility-Administered Medications:     acetaminophen (TYLENOL) tablet 975 mg, 975 mg, Oral, Q8H [address], Jonatan Anish, DO, 975 mg at 01/03/22 0530    busPIRone (BUSPAR) tablet 5 mg, 5 mg, Oral, BID, Jonatan Anish, DO    donepezil (ARICEPT) tablet 10 mg, 10 mg, Oral, Daily, Jonatan Anish, DO    escitalopram (LEXAPRO) tablet 10 mg, 10 mg, Oral, Daily, Jonatan Anish, DO    fluticasone (FLONASE) 50 mcg/act nasal spray 1 spray, 1 spray, Nasal, Daily, Jonatan Anish, DO    heparin (porcine) subcutaneous injection 5,000 Units, 5,000 Units, Subcutaneous, Q8H [address], 5,000 Units at 01/02/22 2348 **AND** Platelet count, , , Once, Jonatan Anish, DO    ondansetron [facility]) injection 4 mg, 4 mg, Intravenous, Q6H PRN, Jonatan Anish, DO    oxyCODONE (ROXICODONE) IR tablet 5 mg, 5 mg, Oral, Q4H PRN, Jonatan Anish, DO, 5 mg at 01/02/22 2347    pravastatin (PRAVACHOL) tablet 40 mg, 40 mg, Oral, Daily With Dinner, Jonatan Anish, DO    QUEtiapine (SEROquel) tablet 25 mg, 25 mg, Oral, HS, Jonatan Anish, DO, 25 mg at 01/02/22 2347    senna-docusate sodium (SENOKOT S) 8 6-50 mg per tablet 1 tablet, 1 tablet, Oral, HS, Jonatan Anish, DO    tamsulosin (FLOMAX) capsule 0 4 mg, 0 4 mg, Oral, Daily, Jonatan Anish, DO    Blood Culture:   No results found for: BLOODCX    Wound Culture:   No results found for: WOUNDCULT    Ins and Outs:  No intake/output data recorded            Physical:  Vitals:    01/02/22 2305   BP: 143/78   Pulse: 82 Resp:    Temp:    SpO2: 97%     Musculoskeletal: left Lower Extremity  · Skin intact   · TTP left hip   · Unable to assess motor and sensory secondary to AMS     Assessment:    67 y  o male S/P fall with left femoral neck fracture        Plan:  · NWB LLE   · NPO   · OR today   · PT/OT postop   · Pain control  · DVT ppx - please hold for OR   · Dispo: Ortho will follow    Christopher Porter MD

## 2022-01-03 NOTE — ASSESSMENT & PLAN NOTE
· Chronic history of Alzheimer's disease; at baseline minimal verbal responses and agitation at night  · Continue home meds  · Continue supportive care  · Geriatrics following

## 2022-01-03 NOTE — CONSULTS
Consultation - Geriatric Medicine   Julia Uriarte 67 y o  male MRN: [id number]  [facility] Encounter: [id number]      Assessment/Plan     Ambulatory dysfunction with fall  -reportedly mechanical fall from wheelchair at home 1/2/22  -(+) head strike (uncertain) loss of consciousness  -injuries as outlined below  -Requires use of wheelchair  for ambulation at baseline  -hx recurrent falls with at least one additional in past six months  -remains high risk future falls due to age, hx fall, deconditioning/debility and unfamiliar environment   -encourage good body mechanics and assist with all transfers  -keep personal items and call bell close to prevent reaching  -maintain environment free of fall hazards  -encourage appropriate footwear and adequate lighting at all times when out of bed  -PT and OT pending    Left femoral neck fracture  -s/p reportedly mechanical fall PTA  -from left femur XR on admission  -NWB LLE  -neurovascular checks per protocol  -continue acute pain control  -ortho following - anticipate surgical intervention during current admission    Acute pain due to trauma  -recommend pain control per Geriatric pain protocol:  Tylenol 975mg Q8H scheduled  Roxicodone 2 5mg Q4H PRN moderate pain  Roxicodone 5mg Q4H PRN severe pain  Dilaudid 0 2mg Q4H PRN  -consider adjuncts such as lidocaine patch topically  -encourage addition of non-pharmacologic pain treatment including ice and frequent repositioning  -recommend  bowel regimen to prevent and treat constipation due to increased risk with acute pain and opiate pain medications    Osteoporosis  -evidence by fragility fracture of left femur sustained in fall from less than standing height  -consider checking vitamin-D level, encourage well-balanced nutrition rich in calcium and vitamin-D -consider supplementation of any needs unable to be met by diet alone  -given age, functional status and severity of dementia unlikely to benefit from DEXA scan and unlikely to participate with evaluation however could consider antiresorptive therapy - recommend outpatient follow-up with PCP for discussion of risk versus benefit    Alzheimer's dementia with behavioral disturbance  -severe and progressive  -at baseline completely dependent for all cares and primarily nonverbal- wife is primary caregiver, very high risk caregiver burnout - looking into long-term care placement  -home regimen includes Aricept and maintained on Seroquel for agitation and behaviors  -hx encephalopathy/agitation during previous hospitalizations- increasing risk of recurrence during current hospitalization  -minimize transitions between rooms, units, facilities especially late afternoon/early evening to reduce risk of precipitating sundowning behaviors  -encourage continuity among staff and caregivers during hospitalization   -maintain calm and quiet environment reduce risk overstimulation    High risk caregiver burnout  -elderly wife is patient's primary caregiver  -cares are becoming more than can be safely managed at home - family looking into long-term care placement  -consider consultation of caregiver resource packet  -case management consult for dispo assistance    Impaired Vision  -recommend use of corrective lenses at all appropriate times  -encourage adequate lighting and encourage use of assistance with ambulation  -keep personal belongings close to person to avoid reaching  -encourage appropriate footwear at all times    BPH without LUTS  -maintained on tamsulosin as outpatient  -BPH increases risk urinary retention - recommend urinary retention protocol - patient incontinent of baseline  -encourage aggressive bowel regimen to reduce risk constipation contributing to increased risk of urinary tension    Deconditioning/debility/frailty  -clinical frailty scale stage 7/8 severely to very severely frail  -multifactorial including age and advanced dementia now with traumatic injury superimposed on frail elderly individual with limited to no physiologic and metabolic reserve  -albumin 4 6, encourage well-balanced nutrition, consider nutritional supplements between meals if oral intake is poor  -continue optimization chronic medical conditions and address acute metabolic derangements as arise    Delirium precautions  -Patient is high risk of delirium due to age, fall, traumatic injuries, acute pain, hospitalization in frail elderly individual with underlying dementia  -Initiate delirium precautions  -maintain normal sleep/wake cycle  -minimize overnight interruptions, group overnight vitals/labs/nursing checks as possible  -dim lights, close blinds and turn off tv to minimize stimulation and encourage sleep environment in evenings  -ensure that pain is well controlled   -monitor for fecal and urinary retention which may precipitate delirium  -encourage early mobilization with assistance as appropriate once cleared by Orthopedic surgery to safely do so  -provide frequent reorientation and redirection  -encourage family and friends at the bedside to help help calm patient if anxious  -minimize use of medications which may precipitate or worsen delirium such as tramadol, benzodiazepine, anticholinergics, and benadryl  -encourage hydration and nutrition   -redirect unwanted behaviors as first line    Home medication review    [facility] pharmacy [phone]:     BuSpar 5 mg BID  B12 1000 mcg IM monthly  Aricept 10 mg daily  Flonase 50 mcg/act  Seroquel 25 mg hs  Simvastatin 20 mg daily  Tamsulosin 0 4 mg daily    Care coordination:  Rounded with Helio Talley (RN)    History of Present Illness   Physician Requesting Consult: Vladimir Davis DO  Reason for Consult / Principal Problem:  Fall  Hx and PE limited by:  Severe dementia  Additional history obtained from:  Chart review and patient evaluation      HPI: Coby Cockayne is a 67y o  year old male with advanced Alzheimer's dementia, hypertension, hyperlipidemia, moderate protein calorie malnutrition, and debility who is admitted to medical service with ambulatory dysfunction fall found to have left femoral neck fracture on admission imaging, he is being seen in consultation by Geriatrics for high risk developing delirium during hospitalization  He is seen and examined at bedside, at time of evaluation he appears to be resting comfortably  Due to his advanced dementia he is unreliable historian and thus HPI is obtained through extensive chart review and discussion with hospital teams  He is admitted following mechanical fall from wheelchair at home on 1/2/21 at which time his wife who is his primary caretaker stepped out of the room momentarily when she heard a loud noise and return to find  on floor  He was found to have head laceration suggestive of head strike however due to advanced dementia is unable to state whether he lost consciousness  Prior to admission residing home with his wife who is his primary caretaker, due to his severe dementia he is fully dependent for all cares and has history of sundowning/agitation/encephalopathy both at home and during hospitalization, he is now mostly nonverbal   He requires use of wheelchair for mobilization at baseline, he wears glasses but does not use hearing aids or dentures  His care are becoming much more than able to be safely managed at home and family looking into long-term care placement on discharge      Inpatient consult to Gerontology  Consult performed by: Nighat Huerta DO  Consult ordered by: Jose Mclean DO        Review of Systems   Unable to perform ROS: Dementia     Historical Information   Past Medical History:   Diagnosis Date    Alzheimer disease ([facility] )     Anemia     BPH (benign prostatic hyperplasia)     [facility]     Hyperlipidemia     Hypertension     Memory loss      Past Surgical History:   Procedure Laterality Date    COLONOSCOPY  11/19/2019    5 years    MS OPEN RX FEMUR FX+INTRAMED JOSE Right 11/5/2021    Procedure: INSERTION LONG NAIL IM FEMUR ANTEGRADE (TROCHANTERIC); Surgeon: Allison Barragan DO;  Location: [facility];  Service: Orthopedics    REPLACEMENT TOTAL KNEE Right 06/19/2019     Social History   Social History     Substance and Sexual Activity   Alcohol Use Not Currently     Social History     Substance and Sexual Activity   Drug Use Not Currently     Social History     Tobacco Use   Smoking Status Former Smoker    Packs/day: 0 25    Years: 10 00    Pack years: 2 50   Smokeless Tobacco Never Used   Tobacco Comment    1PPW     Family History:   Family History   Problem Relation Age of Onset    Lung disease Father         coal workers' pneumoconiosis     Meds/Allergies   all current active meds have been reviewed    No Known Allergies    Objective   No intake or output data in the 24 hours ending 01/03/22 0620  Invasive Devices  Report    Peripheral Intravenous Line            Peripheral IV 01/02/22 Left Hand <1 day              Physical Exam  Vitals and nursing note reviewed  Constitutional:       General: He is not in acute distress  Comments: Thin, very frail elderly appearing male   HENT:      Head: Normocephalic  Comments: Dried blood left scalp     Nose: Nose normal       Mouth/Throat:      Mouth: Mucous membranes are dry  Comments: Upper dentition intact however dentition is overall poor  Eyes:      General:         Right eye: No discharge  Left eye: No discharge  Neck:      Comments: Trachea appears midline  Cardiovascular:      Rate and Rhythm: Normal rate and regular rhythm  Pulses: Normal pulses  Pulmonary:      Effort: Pulmonary effort is normal  No respiratory distress  Breath sounds: No wheezing  Abdominal:      General: Bowel sounds are normal  There is no distension  Palpations: Abdomen is soft  Tenderness: There is no abdominal tenderness        Comments: Abdomen scaphoid   Musculoskeletal: Cervical back: Neck supple  Right lower leg: No edema  Left lower leg: No edema  Comments: Diffuse severe subcutaneous fat and muscle wasting   Skin:     General: Skin is warm and dry  Comments: Skin thin and friable  Bruising bilateral upper extremity   Neurological:      Mental Status: He is alert        Comments: Sleeping, does not awaken to voice, withdraws to tactile stimuli   Psychiatric:      Comments: Not currently agitated       Lab Results:   I have personally reviewed pertinent lab results including the following:    -sodium 137, potassium 4 0, chloride 109, CO2 22, BUN 18, creatinine 0 59  -hemoglobin 10 8, hematocrit 32 8, WBC 9 69, platelets 809    I have personally reviewed the following imaging study reports in PACS:    CT head without contrast 1/2/22:  No acute intracranial it, scalp hematoma left parietal region  CT C-spine without contrast 1/2/22:  No cervical spine fracture or traumatic subluxation, increased kyphotic curve upper cervical 70 due to patient positioning or muscle spasm  Left femur XR 1/2/22:  Impacted left femoral neck fracture    Therapies:   PT:  Pending  OT:  Pending    VTE Prophylaxis: Heparin    Code Status: Level 3 - DNAR and DNI  Advance Directive and Living Will:      Power of :    POLST:      Family and Social Support:  Wife    Goals of Care:  Patient cannot state due to advanced dementia

## 2022-01-03 NOTE — PROGRESS NOTES
[facility]  Progress Note Jerline Delay 1945, 67 y o  male MRN: [id number]  Unit/Bed#: [facility] 602-01 Encounter: [id number]  Primary Care Provider: HORACIO Devine   Date and time admitted to hospital: 1/2/2022  2:07 PM    * Closed fracture of neck of left femur [facility])  Assessment & Plan  · Secondary to mechanical fall   · Patient has no complication from previous surgery  Currently no active cardiac disease  Patient at acceptable risk for surgery  · Appreciate ortho evaluation, tentative plan for surgery today   · Analgesics as needed   · PT/OT postoperatively     Ambulatory dysfunction  Assessment & Plan  · Previous history of right hip fracture status post ORIF on 11/5/2021  · PT/OT evaluation postoperatively     Alzheimer's disease ([facility] )  Assessment & Plan  · Chronic history of Alzheimer's disease; at baseline minimal verbal responses and agitation at night  · Continue home meds  · Continue supportive care  · Geriatrics following       VTE Pharmacologic Prophylaxis: VTE Score: 10 High Risk (Score >/= 5) - Pharmacological DVT Prophylaxis Ordered: heparin  Sequential Compression Devices Ordered  Patient Centered Rounds: I performed bedside rounds with nursing staff today  Discussions with Specialists or Other Care Team Provider: primary RN, case management     Education and Discussions with Family / Patient: Updated  (wife) via phone  Time Spent for Care: 15 minutes  More than 50% of total time spent on counseling and coordination of care as described above  Current Length of Stay: 1 day(s)  Current Patient Status: Inpatient   Certification Statement: The patient will continue to require additional inpatient hospital stay due to pending OR and therapy evaluations   Discharge Plan: Anticipate discharge in >72 hrs to discharge location to be determined pending rehab evaluations      Code Status: Level 3 - DNAR and DNI    Subjective:   Patient is non verbal  Appears comfortable lying in bed, no acute distress  Objective:     Vitals:   Temp (24hrs), Av 9 °F (36 6 °C), Min:97 7 °F (36 5 °C), Max:98 1 °F (36 7 °C)    Temp:  [97 7 °F (36 5 °C)-98 1 °F (36 7 °C)] 98 1 °F (36 7 °C)  HR:  [82-91] 91  Resp:  [16-30] 16  BP: ()/() 97/56  SpO2:  [93 %-98 %] 93 %  There is no height or weight on file to calculate BMI  Input and Output Summary (last 24 hours): Intake/Output Summary (Last 24 hours) at 1/3/2022 1330  Last data filed at 1/3/2022 0900  Gross per 24 hour   Intake 0 ml   Output --   Net 0 ml       Physical Exam:   Physical Exam  Vitals and nursing note reviewed  Exam conducted with a chaperone present  Cardiovascular:      Rate and Rhythm: Normal rate and regular rhythm  Pulses: Normal pulses  Heart sounds: No murmur heard  Pulmonary:      Effort: Pulmonary effort is normal  No respiratory distress  Comments: Diminished breath sounds with poor patient effort  Abdominal:      General: Abdomen is flat  There is no distension  Palpations: Abdomen is soft  Tenderness: There is no abdominal tenderness  Comments: No grimace to palpitation   Genitourinary:     Comments: Arredondo catheter with clear yellow urine output  Musculoskeletal:      Right lower leg: No edema  Left lower leg: No edema  Neurological:      General: No focal deficit present  Mental Status: Mental status is at baseline        Comments: Nonverbal         Additional Data:     Labs:  Results from last 7 days   Lab Units 22  0516   WBC Thousand/uL 6 40   HEMOGLOBIN g/dL 10 5*   HEMATOCRIT % 33 2*   PLATELETS Thousands/uL 233   NEUTROS PCT % 77*   LYMPHS PCT % 14   MONOS PCT % 6   EOS PCT % 1     Results from last 7 days   Lab Units 22  0516   SODIUM mmol/L 136   POTASSIUM mmol/L 3 9   CHLORIDE mmol/L 108   CO2 mmol/L 22   BUN mg/dL 13   CREATININE mg/dL 0 56*   ANION GAP mmol/L 6   CALCIUM mg/dL 8 8   GLUCOSE RANDOM mg/dL 101     Results from last 7 days   Lab Units 01/02/22  1808   INR  0 98                   Lines/Drains:  Invasive Devices  Report    Peripheral Intravenous Line            Peripheral IV 01/02/22 Left Hand <1 day          Drain            External Urinary Catheter Medium <1 day                      Imaging: No pertinent imaging reviewed  Recent Cultures (last 7 days):         Last 24 Hours Medication List:   Current Facility-Administered Medications   Medication Dose Route Frequency Provider Last Rate    acetaminophen  975 mg Oral [facility] Eliza Martin, DO      busPIRone  5 mg Oral BID Eliza Martin, DO      donepezil  10 mg Oral Daily Eliza Martin, DO      escitalopram  10 mg Oral Daily Eliza Martin, DO      fluticasone  1 spray Nasal Daily Eliza Martin, DO      heparin (porcine)  5,000 Units Subcutaneous [facility] Eliza Martin, DO      lidocaine  1 patch Topical Daily Audrey Barron PA-C      ondansetron  4 mg Intravenous Q6H PRN Eliza Martin, DO      oxyCODONE  5 mg Oral Q4H PRN Eliza Martin, DO      pravastatin  40 mg Oral Daily With [employer] Mario, DO      QUEtiapine  25 mg Oral HS Eliza Martin, DO      senna-docusate sodium  1 tablet Oral HS Eliza Martin, DO      tamsulosin  0 4 mg Oral Daily Eliza Martin, DO          Today, Patient Was Seen By: Trang Amor PA-C    **Please Note: This note may have been constructed using a voice recognition system  **

## 2022-01-03 NOTE — PROGRESS NOTES
ADT notification received 1/2/22 for patient admission to [facility] s/p mechanical fall at home  Patient with unwitnessed fall out of  with head strike  Patient with 2 cm and a 1 cm superficial linear lacerations on the left occiput, closed w/ skin glue  CT pelvis w/o contrast yielded new acute impacted transcervical fracture of left femoral neck and imaging of abdomin / pelvis yielded stable closed fracture of neck of left femur  Possible OR today for surgical repair of left hip  BPCI form and care coordination note updated  This CM will continue to monitor via chart review throughout hospitalization   --------------------------------------------------------    Call received from patient's wife Lucille Quintana this morning with update on patient  She reports patient was doing very well at home but she has prepared for patient too become long term care after any short term rehab post hospital discharge  Leonel Guo states that she applied for a long term care bed (in November 2021) at [facility] which is her first choice  She reports being told it may be several months until a bed becomes available  She also questions CM about other facilities with dementia unit  CM informed her about [facility] secured dementia units both in [place] and [place]  She reports she has care for patient at home for the last month and can not longer provide the care he needs  She reports that she also called [facility] today to cancel the scheduled home health visits  CM agreed to get message to inpatient case management with above  CM routed this encounter to [facility] office staff member with above

## 2022-01-03 NOTE — ASSESSMENT & PLAN NOTE
· Previous history of right hip fracture status post ORIF on 11/5/2021  · PT/OT evaluation postoperatively

## 2022-01-04 ENCOUNTER — APPOINTMENT (INPATIENT)
Dept: RADIOLOGY | Facility: HOSPITAL | Age: 77
DRG: 480 | End: 2022-01-04
Payer: MEDICARE

## 2022-01-04 PROBLEM — G89.11 ACUTE PAIN DUE TO TRAUMA: Status: ACTIVE | Noted: 2022-01-04

## 2022-01-04 PROBLEM — R54 FRAILTY SYNDROME IN GERIATRIC PATIENT: Chronic | Status: ACTIVE | Noted: 2022-01-04

## 2022-01-04 PROBLEM — Z91.89 AT HIGH RISK FOR CAREGIVER ROLE STRAIN: Chronic | Status: ACTIVE | Noted: 2022-01-04

## 2022-01-04 PROBLEM — Z91.89 AT RISK FOR DELIRIUM: Chronic | Status: ACTIVE | Noted: 2022-01-04

## 2022-01-04 PROBLEM — H54.7 IMPAIRED VISION: Chronic | Status: ACTIVE | Noted: 2022-01-04

## 2022-01-04 LAB
ANION GAP SERPL CALCULATED.3IONS-SCNC: 4 MMOL/L (ref 4–13)
BUN SERPL-MCNC: 13 MG/DL (ref 5–25)
CALCIUM SERPL-MCNC: 8.9 MG/DL (ref 8.3–10.1)
CHLORIDE SERPL-SCNC: 112 MMOL/L (ref 100–108)
CO2 SERPL-SCNC: 23 MMOL/L (ref 21–32)
CREAT SERPL-MCNC: 0.66 MG/DL (ref 0.6–1.3)
ERYTHROCYTE [DISTWIDTH] IN BLOOD BY AUTOMATED COUNT: 14.4 % (ref 11.6–15.1)
GFR SERPL CREATININE-BSD FRML MDRD: 93 ML/MIN/1.73SQ M
GLUCOSE SERPL-MCNC: 85 MG/DL (ref 65–140)
GLUCOSE SERPL-MCNC: 87 MG/DL (ref 65–140)
HCT VFR BLD AUTO: 34.1 % (ref 36.5–49.3)
HGB BLD-MCNC: 10.5 G/DL (ref 12–17)
MCH RBC QN AUTO: 28.5 PG (ref 26.8–34.3)
MCHC RBC AUTO-ENTMCNC: 30.8 G/DL (ref 31.4–37.4)
MCV RBC AUTO: 93 FL (ref 82–98)
PLATELET # BLD AUTO: 242 THOUSANDS/UL (ref 149–390)
PMV BLD AUTO: 10.4 FL (ref 8.9–12.7)
POTASSIUM SERPL-SCNC: 3.6 MMOL/L (ref 3.5–5.3)
RBC # BLD AUTO: 3.68 MILLION/UL (ref 3.88–5.62)
SODIUM SERPL-SCNC: 139 MMOL/L (ref 136–145)
WBC # BLD AUTO: 6.14 THOUSAND/UL (ref 4.31–10.16)

## 2022-01-04 PROCEDURE — 85027 COMPLETE CBC AUTOMATED: CPT | Performed by: PHYSICIAN ASSISTANT

## 2022-01-04 PROCEDURE — C1769 GUIDE WIRE: HCPCS | Performed by: ORTHOPAEDIC SURGERY

## 2022-01-04 PROCEDURE — NC001 PR NO CHARGE: Performed by: ORTHOPAEDIC SURGERY

## 2022-01-04 PROCEDURE — 80048 BASIC METABOLIC PNL TOTAL CA: CPT | Performed by: PHYSICIAN ASSISTANT

## 2022-01-04 PROCEDURE — 73502 X-RAY EXAM HIP UNI 2-3 VIEWS: CPT

## 2022-01-04 PROCEDURE — 99232 SBSQ HOSP IP/OBS MODERATE 35: CPT | Performed by: INTERNAL MEDICINE

## 2022-01-04 PROCEDURE — C1713 ANCHOR/SCREW BN/BN,TIS/BN: HCPCS | Performed by: ORTHOPAEDIC SURGERY

## 2022-01-04 PROCEDURE — 82948 REAGENT STRIP/BLOOD GLUCOSE: CPT

## 2022-01-04 PROCEDURE — 27236 TREAT THIGH FRACTURE: CPT | Performed by: ORTHOPAEDIC SURGERY

## 2022-01-04 PROCEDURE — 99232 SBSQ HOSP IP/OBS MODERATE 35: CPT | Performed by: PHYSICIAN ASSISTANT

## 2022-01-04 PROCEDURE — NC001 PR NO CHARGE: Performed by: INTERNAL MEDICINE

## 2022-01-04 PROCEDURE — 0QS704Z REPOSITION LEFT UPPER FEMUR WITH INTERNAL FIXATION DEVICE, OPEN APPROACH: ICD-10-PCS | Performed by: ORTHOPAEDIC SURGERY

## 2022-01-04 PROCEDURE — G9197 ORDER FOR CEPH: HCPCS | Performed by: ORTHOPAEDIC SURGERY

## 2022-01-04 DEVICE — 5.0MM TI LOCKING SCR SLF-TPNG W/T25 STARDRIVE 44MM-STERILE: Type: IMPLANTABLE DEVICE | Site: HIP | Status: FUNCTIONAL

## 2022-01-04 DEVICE — BOLT FOR FEMORAL NECK SYSTEM 85MM LENGTH-STERILE: Type: IMPLANTABLE DEVICE | Site: HIP | Status: FUNCTIONAL

## 2022-01-04 DEVICE — ANTIROTATION SCREW FOR FEMORAL NECK SYS 85MM LENGTH - STERIL: Type: IMPLANTABLE DEVICE | Site: HIP | Status: FUNCTIONAL

## 2022-01-04 DEVICE — FEMORAL NECK SYSTEM PLATE 1 HOLE - STERILE: Type: IMPLANTABLE DEVICE | Site: HIP | Status: FUNCTIONAL

## 2022-01-04 RX ORDER — SODIUM CHLORIDE, SODIUM LACTATE, POTASSIUM CHLORIDE, CALCIUM CHLORIDE 600; 310; 30; 20 MG/100ML; MG/100ML; MG/100ML; MG/100ML
125 INJECTION, SOLUTION INTRAVENOUS CONTINUOUS
Status: DISCONTINUED | OUTPATIENT
Start: 2022-01-04 | End: 2022-01-08

## 2022-01-04 RX ORDER — HYDROMORPHONE HCL IN WATER/PF 6 MG/30 ML
0.2 PATIENT CONTROLLED ANALGESIA SYRINGE INTRAVENOUS EVERY 2 HOUR PRN
Status: ACTIVE | OUTPATIENT
Start: 2022-01-04 | End: 2022-01-06

## 2022-01-04 RX ORDER — OXYCODONE HYDROCHLORIDE 5 MG/1
5 TABLET ORAL EVERY 6 HOURS PRN
Qty: 30 TABLET | Refills: 0 | Status: SHIPPED | OUTPATIENT
Start: 2022-01-04 | End: 2022-01-15 | Stop reason: SDUPTHER

## 2022-01-04 RX ORDER — ROCURONIUM BROMIDE 10 MG/ML
INJECTION, SOLUTION INTRAVENOUS AS NEEDED
Status: DISCONTINUED | OUTPATIENT
Start: 2022-01-04 | End: 2022-01-04

## 2022-01-04 RX ORDER — ONDANSETRON 2 MG/ML
4 INJECTION INTRAMUSCULAR; INTRAVENOUS ONCE AS NEEDED
Status: DISCONTINUED | OUTPATIENT
Start: 2022-01-04 | End: 2022-01-04 | Stop reason: HOSPADM

## 2022-01-04 RX ORDER — FENTANYL CITRATE/PF 50 MCG/ML
50 SYRINGE (ML) INJECTION
Status: DISCONTINUED | OUTPATIENT
Start: 2022-01-04 | End: 2022-01-04 | Stop reason: HOSPADM

## 2022-01-04 RX ORDER — LIDOCAINE HYDROCHLORIDE 10 MG/ML
INJECTION, SOLUTION EPIDURAL; INFILTRATION; INTRACAUDAL; PERINEURAL AS NEEDED
Status: DISCONTINUED | OUTPATIENT
Start: 2022-01-04 | End: 2022-01-04

## 2022-01-04 RX ORDER — OXYCODONE HYDROCHLORIDE 10 MG/1
10 TABLET ORAL EVERY 4 HOURS PRN
Status: DISCONTINUED | OUTPATIENT
Start: 2022-01-04 | End: 2022-01-10

## 2022-01-04 RX ORDER — FENTANYL CITRATE 50 UG/ML
INJECTION, SOLUTION INTRAMUSCULAR; INTRAVENOUS AS NEEDED
Status: DISCONTINUED | OUTPATIENT
Start: 2022-01-04 | End: 2022-01-04

## 2022-01-04 RX ORDER — SODIUM CHLORIDE, SODIUM LACTATE, POTASSIUM CHLORIDE, CALCIUM CHLORIDE 600; 310; 30; 20 MG/100ML; MG/100ML; MG/100ML; MG/100ML
75 INJECTION, SOLUTION INTRAVENOUS CONTINUOUS
Status: DISCONTINUED | OUTPATIENT
Start: 2022-01-04 | End: 2022-01-08

## 2022-01-04 RX ORDER — HYDROMORPHONE HCL/PF 1 MG/ML
0.5 SYRINGE (ML) INJECTION
Status: DISCONTINUED | OUTPATIENT
Start: 2022-01-04 | End: 2022-01-04 | Stop reason: HOSPADM

## 2022-01-04 RX ORDER — CEFAZOLIN SODIUM 2 G/50ML
SOLUTION INTRAVENOUS AS NEEDED
Status: DISCONTINUED | OUTPATIENT
Start: 2022-01-04 | End: 2022-01-04

## 2022-01-04 RX ORDER — CEFAZOLIN SODIUM 2 G/50ML
2000 SOLUTION INTRAVENOUS EVERY 8 HOURS
Status: COMPLETED | OUTPATIENT
Start: 2022-01-04 | End: 2022-01-05

## 2022-01-04 RX ORDER — PROPOFOL 10 MG/ML
INJECTION, EMULSION INTRAVENOUS AS NEEDED
Status: DISCONTINUED | OUTPATIENT
Start: 2022-01-04 | End: 2022-01-04

## 2022-01-04 RX ORDER — GLYCOPYRROLATE 0.2 MG/ML
INJECTION INTRAMUSCULAR; INTRAVENOUS AS NEEDED
Status: DISCONTINUED | OUTPATIENT
Start: 2022-01-04 | End: 2022-01-04

## 2022-01-04 RX ADMIN — Medication 50 MCG: at 16:12

## 2022-01-04 RX ADMIN — Medication 50 MCG: at 16:30

## 2022-01-04 RX ADMIN — FENTANYL CITRATE 25 MCG: 50 INJECTION INTRAMUSCULAR; INTRAVENOUS at 15:19

## 2022-01-04 RX ADMIN — GLYCOPYRROLATE 0.1 MG: 0.2 INJECTION, SOLUTION INTRAMUSCULAR; INTRAVENOUS at 15:06

## 2022-01-04 RX ADMIN — CEFAZOLIN SODIUM 2000 MG: 2 SOLUTION INTRAVENOUS at 14:59

## 2022-01-04 RX ADMIN — OXYCODONE HYDROCHLORIDE 5 MG: 5 TABLET ORAL at 22:22

## 2022-01-04 RX ADMIN — PROPOFOL 150 MG: 10 INJECTION, EMULSION INTRAVENOUS at 14:42

## 2022-01-04 RX ADMIN — FENTANYL CITRATE 25 MCG: 50 INJECTION INTRAMUSCULAR; INTRAVENOUS at 15:52

## 2022-01-04 RX ADMIN — QUETIAPINE FUMARATE 25 MG: 25 TABLET ORAL at 22:19

## 2022-01-04 RX ADMIN — SENNOSIDES AND DOCUSATE SODIUM 1 TABLET: 50; 8.6 TABLET ORAL at 22:19

## 2022-01-04 RX ADMIN — LIDOCAINE HYDROCHLORIDE 50 MG: 10 INJECTION, SOLUTION EPIDURAL; INFILTRATION; INTRACAUDAL; PERINEURAL at 14:42

## 2022-01-04 RX ADMIN — BUSPIRONE HYDROCHLORIDE 5 MG: 5 TABLET ORAL at 22:19

## 2022-01-04 RX ADMIN — SODIUM CHLORIDE, SODIUM LACTATE, POTASSIUM CHLORIDE, AND CALCIUM CHLORIDE 100 ML/HR: .6; .31; .03; .02 INJECTION, SOLUTION INTRAVENOUS at 11:55

## 2022-01-04 RX ADMIN — CEFAZOLIN SODIUM 2000 MG: 2 SOLUTION INTRAVENOUS at 22:17

## 2022-01-04 RX ADMIN — ACETAMINOPHEN 975 MG: 325 TABLET, FILM COATED ORAL at 22:19

## 2022-01-04 RX ADMIN — SODIUM CHLORIDE, SODIUM LACTATE, POTASSIUM CHLORIDE, AND CALCIUM CHLORIDE 125 ML/HR: .6; .31; .03; .02 INJECTION, SOLUTION INTRAVENOUS at 16:45

## 2022-01-04 RX ADMIN — SUGAMMADEX 200 MG: 100 INJECTION, SOLUTION INTRAVENOUS at 15:49

## 2022-01-04 RX ADMIN — ROCURONIUM BROMIDE 50 MG: 50 INJECTION, SOLUTION INTRAVENOUS at 14:43

## 2022-01-04 RX ADMIN — FENTANYL CITRATE 50 MCG: 50 INJECTION INTRAMUSCULAR; INTRAVENOUS at 14:42

## 2022-01-04 RX ADMIN — Medication 50 MCG: at 17:21

## 2022-01-04 RX ADMIN — FLUTICASONE PROPIONATE 1 SPRAY: 50 SPRAY, METERED NASAL at 08:20

## 2022-01-04 NOTE — ASSESSMENT & PLAN NOTE
· Secondary to mechanical fall   · Patient has no complication from previous surgery  Currently no active cardiac disease   Patient at acceptable risk for surgery  · Appreciate ortho evaluation, plan for OR today   · Analgesics as needed   · PT/OT postoperatively

## 2022-01-04 NOTE — ANESTHESIA PREPROCEDURE EVALUATION
Procedure:  OPEN REDUCTION W/ INTERNAL FIXATION (ORIF) HIP WITH CANNUALATED SCREWS (Left Hip)    Relevant Problems   CARDIO   (+) Essential hypertension   (+) Hypercholesterolemia      GI/HEPATIC   (+) Dysphagia      /RENAL   (+) Benign prostatic hyperplasia without lower urinary tract symptoms      HEMATOLOGY   (+) Anemia      NEURO/PSYCH   (+) Alzheimer's disease (HCC)        Physical Exam    Airway      TM Distance: >3 FB  Neck ROM: full     Dental       Cardiovascular      Pulmonary      Other Findings        Anesthesia Plan  ASA Score- 3     Anesthesia Type- general with ASA Monitors  Additional Monitors:   Airway Plan: LMA  Plan Factors-    Chart reviewed  EKG reviewed  Existing labs reviewed  Induction- intravenous  Postoperative Plan- Plan for postoperative opioid use  Informed Consent- Anesthetic plan and risks discussed with spouse  I personally reviewed this patient with the CRNA  Discussed and agreed on the Anesthesia Plan with the CRNA  Yordy Valenzuela

## 2022-01-04 NOTE — OP NOTE
PERATIVE REPORT  PATIENT NAME: Rosalinda Shaw    :  1945  MRN: [id number]  Pt Location:  OR ROOM 15    SURGERY DATE: 2022    Surgeon(s) and Role:     * Cristian Hawkins MD - Primary     * Marybeth Martinez MD - Assisting    Preop Diagnosis:  Fracture of unspecified part of neck of left femur, initial encounter for closed fracture ([facility] ) [S72 002A]    Post-Op Diagnosis Codes:     * Fracture of unspecified part of neck of left femur, [facility] [S72 002A]    Procedure(s) (LRB):  OPEN REDUCTION W/ INTERNAL FIXATION (ORIF) HIP WITH CANNUALATED SCREWS (Left)    Specimen(s):  * No specimens in log *    Estimated Blood Loss:   Minimal    Drains:  [REMOVED] External Urinary Catheter Medium (Removed)   Collection Container Standard drainage bag 22 1245   Interventions Removed and skin assessed; Pericare performed;Device changed 22 0151   Output (mL) 425 mL 22 0151   Number of days: 1       Anesthesia Type:   General    Operative Indications:  Fracture of unspecified part of neck of left femur, initial encounter for closed fracture ([facility] ) [S72 002A]    Patient is a 48-year-old male who suffered a left nondisplaced femoral neck fracture after a mechanical fall from standing  In order to restore stability to the hip and to allow for early range of motion and rehabilitation, patient was indicated for closed reduction and percutaneous fixation of the femoral neck  Risks and benefits of surgery were explained to the patient's POA  Power  expressed understanding of these risks and benefits and wished to proceed with operative intervention  Operative Findings:  See complete op note below    Complications:   None    Procedure and Technique: On the day of surgery, patient was taken to the operating theater where general anesthesia was administered  The patient was then transferred to the operating table and placed in a lateral decubitus position    All bony prominences were padded, the patient was prepped and draped in the year olds usual sterile fashion and a time-out was performed  All were in agreement and the procedure began    Biplanar fluoroscopy was used to localize a laterally based incision to the thigh  A 3 cm incision was made sharply through the skin and fascia down to the bone  A 130 degree guide and a terminally threaded guide pin were inserted into the femoral neck and into the center of the femoral head  Center-center positioning was confirmed via biplanar fluoroscopy  A derotational pin was then placed into the femoral neck and head remote from planned fixation  The guide pin was measured for length and a Reamer was inserted over the pin to a length of 85 mm  The proprietary jig containing the lateral plate was then placed over the central guide pin and the sleeve impacted into position  The distal locking hole was then drilled and a 46 mm locking screw was placed through the plate  The central guide pin was then removed and a 5 mm drill bit was used to drill for the diverging screw  We then applied compression of the fracture through the jig  Final positioning of the implant was confirmed via biplanar fluoroscopy  The incision was thoroughly irrigated with normal saline and a layered closure was performed with 1  Vicryl in the fascia, 2-Vicryl on the subdermal layer and a stapled skin closure  A Mepilex dressing was then placed  Patient was transferred to his inpatient bed, extubated and transported to the PACU in stable condition  Dr Awais Caballero was present for all critical portions of the case      Postoperative plan includes weight-bearing as tolerated to the left lower extremity and 28 days of DVT prophylaxis per primary team           SIGNATURE: Da Cummins MD  DATE: January 4, 2022  TIME: 6:21 PM

## 2022-01-04 NOTE — ASSESSMENT & PLAN NOTE
· Secondary to mechanical fall   · Appreciate ortho evaluation  · S/p left hip cannulated screw fixation on 1/3  · Continue pain control with scheduled Tylenol, Lidoderm patch, prn low dose oxycodone   · Continue with DVT ppx   · PT/OT evaluations pending

## 2022-01-04 NOTE — PROGRESS NOTES
Progress Note - Orthopedics   Ishmael Foster 67 y o  male MRN: [id number]  [facility]      Subjective:    No acute events overnight  Denies fevers chills, CP, SOB  NPO for OR today       Labs:  0   Lab Value Date/Time    HCT 33 2 (L) 01/03/2022 0516    HCT 32 8 (L) 01/02/2022 1808    HCT 33 0 (L) 12/01/2021 0525    HGB 10 5 (L) 01/03/2022 0516    HGB 10 8 (L) 01/02/2022 1808    HGB 10 2 (L) 12/01/2021 0525    INR 0 98 01/02/2022 1808    WBC 6 40 01/03/2022 0516    WBC 9 69 01/02/2022 1808    WBC 7 09 12/01/2021 0525       Meds:    Current Facility-Administered Medications:     acetaminophen (TYLENOL) tablet 975 mg, 975 mg, Oral, Q8H [address], Italia Kim DO, 975 mg at 01/03/22 2212    busPIRone (BUSPAR) tablet 5 mg, 5 mg, Oral, BID, Italia Kim DO, 5 mg at 01/03/22 2213    donepezil (ARICEPT) tablet 10 mg, 10 mg, Oral, Daily, Italia Kim DO, 10 mg at 01/03/22 0816    escitalopram (LEXAPRO) tablet 10 mg, 10 mg, Oral, Daily, Italia Kim DO, 10 mg at 01/03/22 0816    fluticasone (FLONASE) 50 mcg/act nasal spray 1 spray, 1 spray, Nasal, Daily, Italia Kim DO    heparin (porcine) subcutaneous injection 5,000 Units, 5,000 Units, Subcutaneous, Q8H [address], 5,000 Units at 01/03/22 2213 **AND** Platelet count, , , Once, Italia Kim DO    lactated ringers infusion, 100 mL/hr, Intravenous, Continuous, Trang Curiel MD, Last Rate: 100 mL/hr at 01/03/22 2338, 100 mL/hr at 01/03/22 2338    lidocaine (LIDODERM) 5 % patch 1 patch, 1 patch, Topical, Daily, Audrey Barron PA-C    ondansetron (ZOFRAN) injection 4 mg, 4 mg, Intravenous, Q6H PRN, Italia Kim DO    oxyCODONE (ROXICODONE) IR tablet 5 mg, 5 mg, Oral, Q4H PRN, Italia Kim DO, 5 mg at 01/03/22 0539    pravastatin (PRAVACHOL) tablet 40 mg, 40 mg, Oral, Daily With Denita Leonard DO    QUEtiapine (SEROquel) tablet 25 mg, 25 mg, Oral, HS, Italia Kim DO, 25 mg at 01/03/22 2213    senna-docusate sodium (SENOKOT S) 8 6-50 mg per tablet 1 tablet, 1 tablet, Oral, HS, Pancho Moseley DO, 1 tablet at 01/03/22 2213    tamsulosin (FLOMAX) capsule 0 4 mg, 0 4 mg, Oral, Daily, Pancho Moseley DO    Blood Culture:   No results found for: BLOODCX    Wound Culture:   No results found for: WOUNDCULT    Ins and Outs:  I/O last 24 hours: In: 556 7 [I V :556 7]  Out: 2100 [Urine:2100]          Physical:  Vitals:    01/03/22 2342   BP: 112/59   Pulse: 72   Resp: 18   Temp: 98 1 °F (36 7 °C)   SpO2: 97%     Musculoskeletal: left Lower Extremity  · Skin intact   · TTP left hip   · Unable to assess motor and sensory secondary to AMS     Assessment:    67 y  o male S/P fall with left femoral neck fracture        Plan:  · NWB LLE   · NPO   · OR today   · PT/OT postop   · Pain control  · DVT ppx - please hold for OR   · Dispo: Ortho will follow    Rhonda Chiu MD

## 2022-01-04 NOTE — ASSESSMENT & PLAN NOTE
· At baseline patient is nonverbal with agitation at night  · Continue home meds  · Continue supportive care  · Geriatrics following

## 2022-01-04 NOTE — CASE MANAGEMENT
Case Management Assessment & Discharge Planning Note    Patient name [facility]  [facility] [address] [facility]N [id number]  : 1945 Date 2022       Current Admission Date: 2022  Current Admission Diagnosis:Closed fracture of neck of left femur [facility])   Patient Active Problem List    Diagnosis Date Noted    Closed fracture of neck of left femur ([facility] [address] ) 2022    Ambulatory dysfunction 2022    Moderate protein-calorie malnutrition ([facility] [address] ) 2021    Sacral decubitus ulcer, stage II ([facility] [address] ) 2021    Encephalopathy 2021    Fall 2021    Closed right hip fracture ([facility] [address] ) 2021    Dysphagia 2021    S/P total knee replacement, right 2021    Anemia 2021    Alzheimer's disease ([facility] ) 2020    Memory impairment 2020    Hypercholesterolemia 2020    Benign prostatic hyperplasia without lower urinary tract symptoms 2020    Essential hypertension 2020    Elevated fasting glucose 2020      LOS (days): 2  Geometric Mean LOS (GMLOS) (days): 2 90  Days to GMLOS:1 1     OBJECTIVE:    Risk of Unplanned Readmission Score: 21      BPCI Notification Given?: Yes  Current admission status: Inpatient       Preferred Pharmacy:   [facility]pharmacy #[id number] Ricardo, [address] [address]  Phone: [phone] Fax: [phone]    Primary Care Provider: HORACIO Burnette    Primary Insurance: MEDICARE  Secondary Insurance: ANGELO [address]:  [address], [address] [facility] - Spouse   Primary Phone: [phone] (Mobile)  Home Phone: [phone]               Advance Directives  Does patient have a [address] [place] [address] Avenue?: Yes  Does patient have Advance Directives?: Yes  Advance Directives: Living will,Power of  for health care      Patient Information  Admitted from[de-identified] Home  Mental Status: Alert  During Assessment patient was accompanied by: Not accompanied during assessment  Assessment information provided by[de-identified] Spouse  Primary Caregiver: Family  Caregiver's Name[de-identified] Guy Holliday Relationship to Patient[de-identified] Family Member  Support Systems: Spouse/significant other  Type of Current Residence: [place]  Living Arrangements: Lives w/ Spouse/significant other  Is patient a ?: No    Activities of Daily Living Prior to Admission  Functional Status: Assistance  Completes ADLs independently?: No  Level of ADL dependence: Assistance  Ambulates independently?: No  Level of ambulatory dependence: Assistance  Does patient use assisted devices?: Yes  Assisted Devices (DME) used: Straight Cane,Walker  Does patient currently own DME?: Yes  What DME does the patient currently own?: Straight Cane,Walker,Wheelchair,Hospital Bed  Does patient have a history of Outpatient Therapy (PT/OT)?: No  Does the patient have a history of Short-Term Rehab?: Yes (Dewey Musa)  Does patient have a history of HHC?: Yes (AMARILIS BLACK)  Does patient currently have [facility]?: Yes    Current Home Health Care  Type of Current Home Care Services: Home OT,Home PT,Nurse visit  Current Home Health Agency[de-identified] [facility] Provider[de-identified] PCP    Patient Information Continued  Income Source: Pension/care home  Does patient have prescription coverage?: Yes  Does patient receive dialysis treatments?: No  Does patient have a history of substance abuse?: No    Means of Transportation  Means of Transport to App[de-identified] Family transport  Was application for public transport provided?: N/A        DISCHARGE DETAILS:    Discharge planning discussed with[de-identified] Herson Hurt Pal of Choice: Yes  Comments - Freedom of Choice: Discussed FOC  CM contacted family/caregiver?: Yes  Were Treatment Team discharge recommendations reviewed with patient/caregiver?: Yes  Did patient/caregiver verbalize understanding of patient care needs?: Yes  Were patient/caregiver advised of the risks associated with not following Treatment Team discharge recommendations?: Yes    Contacts  Patient Contacts: Mindy Arturo  Relationship to Patient[de-identified] Family  Contact Method: Phone  Phone Number: [phone]  Reason/Outcome: Continuity of [address]    DME Referral Provided  Referral made for DME?: No    Other Referral/Resources/Interventions Provided:  Interventions: Short Term Rehab  Referral Comments: Wife agreeable to blanket referral to SNF in area     CM reviewed d/c planning process including the following: identifying help at home, patient preference for d/c planning needs, Discharge Lounge, [facility] Meds to Bed program, availability of treatment team to discuss questions or concerns patient and/or family may have regarding understanding medications and recognizing signs and symptoms once discharged  CM also encouraged patient to follow up with all recommended appointments after discharge  Patient advised of importance for patient and family to participate in managing patients medical well being

## 2022-01-04 NOTE — ANESTHESIA POSTPROCEDURE EVALUATION
Post-Op Assessment Note    CV Status:  Stable  Pain Score: 0    Pain management: adequate     Mental Status:  Awake and confused (same as pre op)   Hydration Status:  Euvolemic   PONV Controlled:  Controlled   Airway Patency:  Patent      Post Op Vitals Reviewed: Yes      Staff: CRNA         No complications documented      BP   144/77   Temp   97 2   Pulse  80   Resp   16   SpO2  100%

## 2022-01-04 NOTE — PROGRESS NOTES
[address] [facility]  Progress Note Jocelyn Smith 1945, 67 y o  male MRN: [id number]  Unit/Bed#: [facility] 602-01 Encounter: [id number]  Primary Care Provider: HORACIO Appiah   Date and time admitted to hospital: 1/2/2022  2:07 PM    * Closed fracture of neck of left femur [facility])  Assessment & Plan  · Secondary to mechanical fall   · Patient has no complication from previous surgery  Currently no active cardiac disease  Patient at acceptable risk for surgery  · Appreciate ortho evaluation, plan for OR today   · Analgesics as needed   · PT/OT postoperatively     Ambulatory dysfunction  Assessment & Plan  · Previous history of right hip fracture status post ORIF on 11/5/2021  · PT/OT evaluation postoperatively     [facility] )  Assessment & Plan  · At baseline patient is nonverbal with agitation at night  · Continue home meds  · Continue supportive care  · Geriatrics following         VTE Pharmacologic Prophylaxis: VTE Score: 10 High Risk (Score >/= 5) - Pharmacological DVT Prophylaxis Ordered: heparin  Sequential Compression Devices Ordered  Patient Centered Rounds: I performed bedside rounds with nursing staff today  Discussions with Specialists or Other Care Team Provider: primary RN, case management     Education and Discussions with Family / Patient: Will call patient's wife with update later today  Time Spent for Care: 15 minutes  More than 50% of total time spent on counseling and coordination of care as described above  Current Length of Stay: 2 day(s)  Current Patient Status: Inpatient   Certification Statement: The patient will continue to require additional inpatient hospital stay due to pending OR with orthopedics, will need monitoring and therapy evaulations postoperatively  Discharge Plan: Anticipate discharge in 48-72 hrs to discharge location to be determined pending rehab evaluations      Code Status: Level 3 - DNAR and DNI    Subjective: Patient sleeping comfortably upon entering the room, nonverbal at baseline, did not awaken for questioning  Objective:     Vitals:   Temp (24hrs), Av 2 °F (36 8 °C), Min:97 7 °F (36 5 °C), Max:99 °F (37 2 °C)    Temp:  [97 7 °F (36 5 °C)-99 °F (37 2 °C)] 98 1 °F (36 7 °C)  HR:  [69-84] 73  Resp:  [17-18] 18  BP: (112-151)/(59-78) 151/78  SpO2:  [97 %-100 %] 100 %  There is no height or weight on file to calculate BMI  Input and Output Summary (last 24 hours): Intake/Output Summary (Last 24 hours) at 2022 1025  Last data filed at 2022 0714  Gross per 24 hour   Intake 556 67 ml   Output 2100 ml   Net -1543 33 ml       Physical Exam:   Physical Exam  Vitals and nursing note reviewed  Constitutional:       General: He is sleeping  He is not in acute distress  Cardiovascular:      Rate and Rhythm: Normal rate and regular rhythm  Pulmonary:      Effort: Pulmonary effort is normal  No respiratory distress  Abdominal:      Comments: No grimace to palpation    Genitourinary:     Comments: External urinary catheter draining clear yellow urine  Musculoskeletal:      Right lower leg: No edema  Left lower leg: No edema  Skin:     General: Skin is warm and dry  Coloration: Skin is not pale  Findings: No erythema  Neurological:      Comments: Nonverbal at baseline          Additional Data:     Labs:  Results from last 7 days   Lab Units 22  0443 22  0516 22  0516   WBC Thousand/uL 6 14   < > 6 40   HEMOGLOBIN g/dL 10 5*   < > 10 5*   HEMATOCRIT % 34 1*   < > 33 2*   PLATELETS Thousands/uL 242   < > 233   NEUTROS PCT %  --   --  77*   LYMPHS PCT %  --   --  14   MONOS PCT %  --   --  6   EOS PCT %  --   --  1    < > = values in this interval not displayed       Results from last 7 days   Lab Units 22  0443   SODIUM mmol/L 139   POTASSIUM mmol/L 3 6   CHLORIDE mmol/L 112*   CO2 mmol/L 23   BUN mg/dL 13   CREATININE mg/dL 0 66   ANION GAP mmol/L 4   CALCIUM mg/dL 8 9   GLUCOSE RANDOM mg/dL 87     Results from last 7 days   Lab Units 01/02/22  1808   INR  0 98                   Lines/Drains:  Invasive Devices  Report    Peripheral Intravenous Line            Peripheral IV 01/04/22 Right Forearm <1 day          Drain            External Urinary Catheter Medium 1 day                      Imaging: No pertinent imaging reviewed  Recent Cultures (last 7 days):         Last 24 Hours Medication List:   Current Facility-Administered Medications   Medication Dose Route Frequency Provider Last Rate    acetaminophen  975 mg Oral [facility] Lauraine Muzzy, DO      busPIRone  5 mg Oral BID Lauraine Muzzy, DO      donepezil  10 mg Oral Daily Lauraine Muzzy, DO      escitalopram  10 mg Oral Daily Lauraine Muzzy, DO      fluticasone  1 spray Nasal Daily Lauraine Muzzy, DO      heparin (porcine)  5,000 Units Subcutaneous [facility] Lauraine Muzzy, DO      lactated ringers  100 mL/hr Intravenous Continuous Marybeth Martinez  mL/hr (01/03/22 0725)    lidocaine  1 patch Topical Daily Audrey Barron PA-C      ondansetron  4 mg Intravenous Q6H PRN Lauraine Muzzy, DO      oxyCODONE  5 mg Oral Q4H PRN Lauraine Muzzy, DO      pravastatin  40 mg Oral Daily With [employer] Mario, DO      QUEtiapine  25 mg Oral HS Lauraine Muzzy, DO      senna-docusate sodium  1 tablet Oral HS Lauraine Muzzy, DO      tamsulosin  0 4 mg Oral Daily Lauraine Muzzy, DO          Today, Patient Was Seen By: Javi Latham PA-C    **Please Note: This note may have been constructed using a voice recognition system  **

## 2022-01-04 NOTE — ASSESSMENT & PLAN NOTE
· Previous history of right hip fracture status post ORIF on 11/5/2021 and now with left femoral fracture as above   · Fall precautions, safe ambulation  · PT/OT

## 2022-01-04 NOTE — PROGRESS NOTES
Progress Note - Geriatric Medicine   Ric Felton 67 y o  male MRN: [id number]  Unit/Bed#: [facility] 602-01 Encounter: [id number]      Assessment/Plan:    Ambulatory dysfunction with fall  Assessment & Plan  -reportedly mechanical fall at home 1/2/22  -head strike reported, unknown loss of conscious  -injuries as outlined below  -primarily wheelchair-bound at baseline  -continue fall precautions, PT/OT    * Closed fracture of neck of left femur ([facility] [address] )  Assessment & Plan  -underwent ORIF earlier today with Orthopedics  -continue neurovascular checks per protocol  -monitor for acute blood loss anemia  -continue acute pain control    Acute pain due to trauma  Assessment & Plan  -encourage multimodal pain control per geriatric pain protocol  -recommend bowel regimen to reduce risk constipation associated with opiate pain medication use    Alzheimer's disease ([facility] [address] )  Assessment & Plan  -severe and progressive  -at baseline completely dependent for all cares and primarily nonverbal- wife is primary caregiver, very high risk caregiver burnout - looking into long-term care placement  -home regimen includes Aricept and Seroquel - may require PRN regimen with Zyprexa during hospitalization given high of encephalopathy/behavioral disturbances especially following anesthesia  -hx encephalopathy/agitation during previous hospitalizations- increasing risk of recurrence during current hospitalization  -maintain calm and quiet environment reduce risk overstimulation    High risk of developing delirium   Assessment & Plan  -due to age, underlying dementia and comorbidities now s/p anesthesia in patient with history of acute metabolic encephalopathy during previous hospitalizations  -continue home donepezil and Seroquel regimen, if absolutely necessary consider low-dose Zyprexa 2 5mg Q8H PRN agitation unable to be redirected with conservative measures, please ensure pain and all other physiologic needs met and addressed before consideration of administration of medication for agitation as they are frequent precipitants of encephalopathy/agitation in patient sore unable to clearly communicate these needs due to underlying dementia/cognitive impairment    Impaired vision  Assessment & Plan  -requires use of corrective lenses, encourage use at all appropriate times  -consider large font for printed materials provided to patient    Frailty syndrome in geriatric patient  Assessment & Plan  -clinical frailty scale stage 7/8  -multifactorial including age and co-morbidities  -continue optimization of nutritional intake and chronic conditions    At high risk for caregiver role strain  Assessment & Plan  -wife primary caregiver and at very high risk of caregiver burnout  -cares becoming more than able to be safely managed in the home and family looking into long-term care placement, cm following, appreciate assistance  -continue psychosocial supports of patient and caregiver    Care coordination:  Rounded with Sanjuanita Mariano (RN)    Subjective:     Patient seen examined bedside where he is lying resting having just returned from surgical fixation of left femur fracture  Remains somewhat sedated from his procedure and although awake is not yet responding to verbal stimuli answering questions or following commands  Nursing reports no acute events  Review of Systems   Unable to perform ROS: Mental status change     Objective:     Vitals: Blood pressure (!) 138/110, pulse 71, temperature (!) 97 °F (36 1 °C), resp  rate 18, SpO2 98 %  ,There is no height or weight on file to calculate BMI  Intake/Output Summary (Last 24 hours) at 1/4/2022 1840  Last data filed at 1/4/2022 1241  Gross per 24 hour   Intake 556 67 ml   Output 425 ml   Net 131 67 ml     Current Medications: Reviewed    Physical Exam:   Physical Exam  Vitals and nursing note reviewed  Constitutional:       General: He is not in acute distress  Comments:  Thin very frail elderly appearing male  Appears comfortable   HENT:      Head: Normocephalic  Nose: Nose normal       Mouth/Throat:      Mouth: Mucous membranes are dry  Eyes:      General: No scleral icterus  Right eye: No discharge  Left eye: No discharge  Conjunctiva/sclera: Conjunctivae normal    Neck:      Comments: Trachea appears midline  Cardiovascular:      Rate and Rhythm: Normal rate and regular rhythm  Pulmonary:      Effort: Pulmonary effort is normal  No respiratory distress  Breath sounds: No wheezing  Comments: Saturating well on room air  Abdominal:      General: Bowel sounds are normal       Comments: Scaphoid abdomen   Musculoskeletal:      Cervical back: Neck supple  Comments: Diffuse severe subcutaneous fat and muscle wasting   Skin:     General: Skin is warm and dry  Comments: Thin and friable   Neurological:      Comments: Somnolent, opens eyes to verbal stimuli, does not answer questions or follow commands   Psychiatric:      Comments: Inattentive, no agitation        Invasive Devices  Report    Peripheral Intravenous Line            Peripheral IV 01/04/22 Left Forearm <1 day              Lab, Imaging and other studies: I have personally reviewed pertinent reports  Saud Garcia

## 2022-01-04 NOTE — DISCHARGE INSTRUCTIONS
Discharge Instructions - Orthopedics  Coby Cockayne 67 y o  male MRN: [id number]  Unit/Bed#: PACU 08    Weight Bearing Status:                                           Weight bearing as tolerated to the left lower extremity     DVT prophylaxis  Please complete course of lovenox     Pain:  Continue analgesics as directed    Dressing Instructions:   Please keep clean, dry and intact until follow up     Appt Instructions: If you do not have your appointment, please call the clinic at [phone] to schedule a follow-up with Dr Philip Hernandez in 2 weeks  Otherwise followup as scheduled     Contact the office sooner if you experience any increased numbness/tingling in the extremities

## 2022-01-04 NOTE — PLAN OF CARE
Problem: PAIN - ADULT  Goal: Verbalizes/displays adequate comfort level or baseline comfort level  Description: Interventions:  - Encourage patient to monitor pain and request assistance  - Assess pain using appropriate pain scale  - Administer analgesics based on type and severity of pain and evaluate response  - Implement non-pharmacological measures as appropriate and evaluate response  - Consider cultural and social influences on pain and pain management  - Notify physician/advanced practitioner if interventions unsuccessful or patient reports new pain  Outcome: Progressing     Problem: INFECTION - ADULT  Goal: Absence or prevention of progression during hospitalization  Description: INTERVENTIONS:  - Assess and monitor for signs and symptoms of infection  - Monitor lab/diagnostic results  - Monitor all insertion sites, i e  indwelling lines, tubes, and drains  - Monitor endotracheal if appropriate and nasal secretions for changes in amount and color  - [place] appropriate cooling/warming therapies per order  - Administer medications as ordered  - Instruct and encourage patient and family to use good hand hygiene technique  - Identify and instruct in appropriate isolation precautions for identified infection/condition  Outcome: Progressing  Goal: Absence of fever/infection during neutropenic period  Description: INTERVENTIONS:  - Monitor WBC    Outcome: Progressing     Problem: SAFETY ADULT  Goal: Patient will remain free of falls  Description: INTERVENTIONS:  - Educate patient/family on patient safety including physical limitations  - Instruct patient to call for assistance with activity   - Consult OT/PT to assist with strengthening/mobility   - Keep Call bell within reach  - Keep bed low and locked with side rails adjusted as appropriate  - Keep care items and personal belongings within reach  - Initiate and maintain comfort rounds  - Make Fall Risk Sign visible to staff  - Offer Toileting every *2** Hours, in advance of need  - Initiate/Maintain **bed/chair *alarm  - Obtain necessary fall risk management equipment:   - Apply yellow socks and bracelet for high fall risk patients  - Consider moving patient to room near nurses station  Outcome: Progressing  Goal: Maintain or return to baseline ADL function  Description: INTERVENTIONS:  -  Assess patient's ability to carry out ADLs; assess patient's baseline for ADL function and identify physical deficits which impact ability to perform ADLs (bathing, care of mouth/teeth, toileting, grooming, dressing, etc )  - Assess/evaluate cause of self-care deficits   - Assess range of motion  - Assess patient's mobility; develop plan if impaired  - Assess patient's need for assistive devices and provide as appropriate  - Encourage maximum independence but intervene and supervise when necessary  - Involve family in performance of ADLs  - Assess for home care needs following discharge   - Consider OT consult to assist with ADL evaluation and planning for discharge  - Provide patient education as appropriate  Outcome: Progressing  Goal: Maintains/Returns to pre admission functional level  Description: INTERVENTIONS:  - Perform BMAT or MOVE assessment daily    - Set and communicate daily mobility goal to care team and patient/family/caregiver  - Collaborate with rehabilitation services on mobility goals if consulted  - Perform Range of Motion *3** times a day  - Reposition patient every *2** hours    - Dangle patient **3* times a day  - Stand patient *3** times a day  - Ambulate patient *3** times a day  - Out of bed to chair *3** times a day   - Out of bed for meals *3** times a day  - Out of bed for toileting  - Record patient progress and toleration of activity level   Outcome: Progressing     Problem: DISCHARGE PLANNING  Goal: Discharge to home or other facility with appropriate resources  Description: INTERVENTIONS:  - Identify barriers to discharge w/patient and caregiver  - Arrange for needed discharge resources and transportation as appropriate  - Identify discharge learning needs (meds, wound care, etc )  - Arrange for interpretive services to assist at discharge as needed  - Refer to Case Management Department for coordinating discharge planning if the patient needs post-hospital services based on physician/advanced practitioner order or complex needs related to functional status, cognitive ability, or social support system  Outcome: Progressing     Problem: Knowledge Deficit  Goal: Patient/family/caregiver demonstrates understanding of disease process, treatment plan, medications, and discharge instructions  Description: Complete learning assessment and assess knowledge base    Interventions:  - Provide teaching at level of understanding  - Provide teaching via preferred learning methods  Outcome: Progressing     Problem: Potential for Falls  Goal: Patient will remain free of falls  Description: INTERVENTIONS:  - Educate patient/family on patient safety including physical limitations  - Instruct patient to call for assistance with activity   - Consult OT/PT to assist with strengthening/mobility   - Keep Call bell within reach  - Keep bed low and locked with side rails adjusted as appropriate  - Keep care items and personal belongings within reach  - Initiate and maintain comfort rounds  - Make Fall Risk Sign visible to staff  - Offer Toileting every 2 Hours, in advance of need  - Initiate/Maintain **bed/chair*alarm  - Obtain necessary fall risk management equipment: - Apply yellow socks and bracelet for high fall risk patients  - Consider moving patient to room near nurses station  Outcome: Progressing     Problem: Prexisting or High Potential for Compromised Skin Integrity  Goal: Skin integrity is maintained or improved  Description: INTERVENTIONS:  - Identify patients at risk for skin breakdown  - Assess and monitor skin integrity  - Assess and monitor nutrition and hydration status  - Monitor labs   - Assess for incontinence   - Turn and reposition patient  - Assist with mobility/ambulation  - Relieve pressure over bony prominences  - Avoid friction and shearing  - Provide appropriate hygiene as needed including keeping skin clean and dry  - Evaluate need for skin moisturizer/barrier cream  - Collaborate with interdisciplinary team   - Patient/family teaching  - Consider wound care consult   Outcome: Progressing     Problem: MOBILITY - ADULT  Goal: Maintain or return to baseline ADL function  Description: INTERVENTIONS:  -  Assess patient's ability to carry out ADLs; assess patient's baseline for ADL function and identify physical deficits which impact ability to perform ADLs (bathing, care of mouth/teeth, toileting, grooming, dressing, etc )  - Assess/evaluate cause of self-care deficits   - Assess range of motion  - Assess patient's mobility; develop plan if impaired  - Assess patient's need for assistive devices and provide as appropriate  - Encourage maximum independence but intervene and supervise when necessary  - Involve family in performance of ADLs  - Assess for home care needs following discharge   - Consider OT consult to assist with ADL evaluation and planning for discharge  - Provide patient education as appropriate  Outcome: Progressing  Goal: Maintains/Returns to pre admission functional level  Description: INTERVENTIONS:  - Perform BMAT or MOVE assessment daily    - Set and communicate daily mobility goal to care team and patient/family/caregiver  - Collaborate with rehabilitation services on mobility goals if consulted  - Perform Range of Motion **3* times a day  - Reposition patient every **2* hours    - Dangle patient **3* times a day  - Stand patient *3** times a day  - Ambulate patient **3* times a day  - Out of bed to chair **3* times a day   - Out of bed for meals **3* times a day  - Out of bed for toileting  - Record patient progress and toleration of activity level   Outcome: Progressing

## 2022-01-05 LAB
ANION GAP SERPL CALCULATED.3IONS-SCNC: 4 MMOL/L (ref 4–13)
BASOPHILS # BLD AUTO: 0.03 THOUSANDS/ΜL (ref 0–0.1)
BASOPHILS NFR BLD AUTO: 1 % (ref 0–1)
BUN SERPL-MCNC: 16 MG/DL (ref 5–25)
CALCIUM SERPL-MCNC: 8.7 MG/DL (ref 8.3–10.1)
CHLORIDE SERPL-SCNC: 111 MMOL/L (ref 100–108)
CO2 SERPL-SCNC: 27 MMOL/L (ref 21–32)
CREAT SERPL-MCNC: 0.59 MG/DL (ref 0.6–1.3)
EOSINOPHIL # BLD AUTO: 0.25 THOUSAND/ΜL (ref 0–0.61)
EOSINOPHIL NFR BLD AUTO: 4 % (ref 0–6)
ERYTHROCYTE [DISTWIDTH] IN BLOOD BY AUTOMATED COUNT: 14.1 % (ref 11.6–15.1)
GFR SERPL CREATININE-BSD FRML MDRD: 97 ML/MIN/1.73SQ M
GLUCOSE SERPL-MCNC: 80 MG/DL (ref 65–140)
HCT VFR BLD AUTO: 30.4 % (ref 36.5–49.3)
HGB BLD-MCNC: 9.3 G/DL (ref 12–17)
IMM GRANULOCYTES # BLD AUTO: 0.02 THOUSAND/UL (ref 0–0.2)
IMM GRANULOCYTES NFR BLD AUTO: 0 % (ref 0–2)
LYMPHOCYTES # BLD AUTO: 1.37 THOUSANDS/ΜL (ref 0.6–4.47)
LYMPHOCYTES NFR BLD AUTO: 24 % (ref 14–44)
MCH RBC QN AUTO: 28.3 PG (ref 26.8–34.3)
MCHC RBC AUTO-ENTMCNC: 30.6 G/DL (ref 31.4–37.4)
MCV RBC AUTO: 92 FL (ref 82–98)
MONOCYTES # BLD AUTO: 0.5 THOUSAND/ΜL (ref 0.17–1.22)
MONOCYTES NFR BLD AUTO: 9 % (ref 4–12)
NEUTROPHILS # BLD AUTO: 3.58 THOUSANDS/ΜL (ref 1.85–7.62)
NEUTS SEG NFR BLD AUTO: 62 % (ref 43–75)
NRBC BLD AUTO-RTO: 0 /100 WBCS
PLATELET # BLD AUTO: 210 THOUSANDS/UL (ref 149–390)
PMV BLD AUTO: 10.5 FL (ref 8.9–12.7)
POTASSIUM SERPL-SCNC: 4.6 MMOL/L (ref 3.5–5.3)
RBC # BLD AUTO: 3.29 MILLION/UL (ref 3.88–5.62)
SODIUM SERPL-SCNC: 142 MMOL/L (ref 136–145)
WBC # BLD AUTO: 5.75 THOUSAND/UL (ref 4.31–10.16)

## 2022-01-05 PROCEDURE — 99232 SBSQ HOSP IP/OBS MODERATE 35: CPT | Performed by: PHYSICIAN ASSISTANT

## 2022-01-05 PROCEDURE — 80048 BASIC METABOLIC PNL TOTAL CA: CPT | Performed by: STUDENT IN AN ORGANIZED HEALTH CARE EDUCATION/TRAINING PROGRAM

## 2022-01-05 PROCEDURE — 97167 OT EVAL HIGH COMPLEX 60 MIN: CPT

## 2022-01-05 PROCEDURE — NC001 PR NO CHARGE: Performed by: ORTHOPAEDIC SURGERY

## 2022-01-05 PROCEDURE — 99232 SBSQ HOSP IP/OBS MODERATE 35: CPT | Performed by: INTERNAL MEDICINE

## 2022-01-05 PROCEDURE — 85025 COMPLETE CBC W/AUTO DIFF WBC: CPT | Performed by: STUDENT IN AN ORGANIZED HEALTH CARE EDUCATION/TRAINING PROGRAM

## 2022-01-05 PROCEDURE — 97163 PT EVAL HIGH COMPLEX 45 MIN: CPT

## 2022-01-05 RX ADMIN — BUSPIRONE HYDROCHLORIDE 5 MG: 5 TABLET ORAL at 22:13

## 2022-01-05 RX ADMIN — ENOXAPARIN SODIUM 40 MG: 40 INJECTION SUBCUTANEOUS at 08:15

## 2022-01-05 RX ADMIN — SENNOSIDES AND DOCUSATE SODIUM 1 TABLET: 50; 8.6 TABLET ORAL at 22:14

## 2022-01-05 RX ADMIN — BUSPIRONE HYDROCHLORIDE 5 MG: 5 TABLET ORAL at 08:15

## 2022-01-05 RX ADMIN — QUETIAPINE FUMARATE 25 MG: 25 TABLET ORAL at 22:13

## 2022-01-05 RX ADMIN — ACETAMINOPHEN 975 MG: 325 TABLET, FILM COATED ORAL at 13:04

## 2022-01-05 RX ADMIN — TAMSULOSIN HYDROCHLORIDE 0.4 MG: 0.4 CAPSULE ORAL at 08:15

## 2022-01-05 RX ADMIN — CEFAZOLIN SODIUM 2000 MG: 2 SOLUTION INTRAVENOUS at 06:08

## 2022-01-05 RX ADMIN — PRAVASTATIN SODIUM 40 MG: 40 TABLET ORAL at 17:20

## 2022-01-05 RX ADMIN — SODIUM CHLORIDE, SODIUM LACTATE, POTASSIUM CHLORIDE, AND CALCIUM CHLORIDE 125 ML/HR: .6; .31; .03; .02 INJECTION, SOLUTION INTRAVENOUS at 12:08

## 2022-01-05 RX ADMIN — FLUTICASONE PROPIONATE 1 SPRAY: 50 SPRAY, METERED NASAL at 08:16

## 2022-01-05 RX ADMIN — OXYCODONE HYDROCHLORIDE 5 MG: 5 TABLET ORAL at 09:27

## 2022-01-05 RX ADMIN — ACETAMINOPHEN 975 MG: 325 TABLET, FILM COATED ORAL at 22:13

## 2022-01-05 RX ADMIN — ESCITALOPRAM OXALATE 10 MG: 10 TABLET ORAL at 08:15

## 2022-01-05 RX ADMIN — SODIUM CHLORIDE, SODIUM LACTATE, POTASSIUM CHLORIDE, AND CALCIUM CHLORIDE 125 ML/HR: .6; .31; .03; .02 INJECTION, SOLUTION INTRAVENOUS at 03:13

## 2022-01-05 RX ADMIN — DONEPEZIL HYDROCHLORIDE 10 MG: 10 TABLET ORAL at 08:15

## 2022-01-05 RX ADMIN — LIDOCAINE 5% 1 PATCH: 700 PATCH TOPICAL at 08:16

## 2022-01-05 NOTE — ASSESSMENT & PLAN NOTE
-clinical frailty scale stage 7/8, severely frail  -multifactorial including advanced dementia, age and multitude of chronic medical co-morbidities  -continue optimization of nutritional intake and chronic conditions as possible  -continue to ensure that treatments and interventions are in line with patient's and family's wishes and goals of care

## 2022-01-05 NOTE — PHYSICAL THERAPY NOTE
PHYSICAL THERAPY EVALUATION  NAME:  Sacha Blackman  DATE: 01/05/22    AGE:   67 y o  Mrn:   [id number]  ADMIT DX:  Alzheimer's disease ([facility] ) [G30 9, F02 80]  Fall, initial encounter [W19  XXXA]  Superficial laceration of skin [T14  8XXA]  Head injury due to trauma [S09 90XA]  Fracture of unspecified part of neck of left femur, initial encounter for closed fracture ([facility] ) [S72 002A]    Past Medical History:   Diagnosis Date    Alzheimer disease ([facility] )     Anemia     BPH (benign prostatic hyperplasia)     Dementia ([place] [address] )     Hyperlipidemia     Hypertension     Memory loss        Past Surgical History:   Procedure Laterality Date    COLONOSCOPY  11/19/2019    5 years    [place] OPEN RX FEMUR FX+INTRAMED JOSE Right 11/5/2021    Procedure: INSERTION LONG NAIL IM FEMUR ANTEGRADE (TROCHANTERIC); Surgeon: Mackenzie Cornejo DO;  [facility];  Service: Orthopedics    REPLACEMENT TOTAL KNEE Right 06/19/2019       Length Of Stay: 3    PHYSICAL THERAPY EVALUATION:        01/05/22 0940   Pain Assessment   Pain Assessment Tool FLACC   Pain Location/Orientation Orientation: Left; Location: Hip   Pain Rating: FLACC (Rest) - Face 0   Pain Rating: FLACC (Rest) - Legs 0   Pain Rating: FLACC (Rest) - Activity 0   Pain Rating: FLACC (Rest) - Cry 0   Pain Rating: FLACC (Rest) - Consolability 0   Score: FLACC (Rest) 0   Pain Rating: FLACC (Activity) - Face 1   Pain Rating: FLACC (Activity) - Legs 1   Pain Rating: FLACC (Activity) - Activity 0   Pain Rating: FLACC (Activity) - Cry 0   Pain Rating: FLACC (Activity) - Consolability 0   Score: FLACC (Activity) 2   Restrictions/Precautions   Weight Bearing Precautions Per Order Yes   LLE Weight Bearing Per Order WBAT   Other Precautions Cognitive; Chair Alarm; Bed Alarm;Multiple lines; Fall Risk;Pain   Home Living   Type of [address] [place] [address] One level;Stairs to enter with rails   Home Equipment Walker;Cane;Wheelchair-manual;Hospital bed   Additional Comments Patient poor historian  Per case management patient resides at home with his spouse who assist him as needed  Need to clarify patient's home set up and level support available   Prior Function   Level of [place] Needs assistance with ADLs and functional mobility   Lives With Spouse   Receives Help From Family   ADL Assistance Needs assistance   Falls in the last 6 months 1 to 4   Comments Patient poor historian, need to clarify patient's prior level of function   General   Family/Caregiver Present No   Cognition   Overall Cognitive Status Impaired   Arousal/Participation Responsive   Orientation Level Oriented to person;Disoriented to place; Disoriented to time;Disoriented to situation  (will open eyes and look at you when name is called )   Memory Unable to assess   Following Commands Follows one step commands inconsistently   RUE Assessment   RUE Assessment WFL   LUE Assessment   LUE Assessment WFL   RLE Assessment   RLE Assessment WFL   Strength RLE   RLE Overall Strength 3+/5   LLE Assessment   LLE Assessment X  (AROM limited 2* pain )   Strength LLE   LLE Overall Strength 3-/5   Bed Mobility   Supine to Sit 3  Moderate assistance   Additional items Assist x 2; Increased time required;Verbal cues   Transfers   Sit to Stand 2  Maximal assistance   Additional items Assist x 2; Increased time required;Verbal cues   Stand to Sit 2  Maximal assistance   Additional items Assist x 2; Increased time required;Verbal cues   Stand pivot 2  Maximal assistance   Additional items Assist x 2; Increased time required;Verbal cues   Additional Comments b/l HHA utilized for transfers to drop arm recliner chair    Ambulation/Elevation   Gait pattern Not appropriate  (decreased standing tolerance at current time )   Balance   Static Sitting Poor +   Dynamic Sitting Poor +   Static Standing Poor -   Dynamic Standing Poor -   Endurance Deficit   Endurance Deficit Yes   Endurance Deficit Description fatigue, pain    Activity Tolerance   Activity Tolerance Patient limited by fatigue;Patient limited by pain   Medical Staff Made Aware Chise, OT; OT present for co evaluation due to pts unstable presentation, new traumatic injury, and decreaed activity tolerance which all impact pts overall physical performance    Nurse Made Aware Patient appropriate to be seen and mobilized per nursing   Assessment   Prognosis Fair   Problem List Decreased strength;Decreased range of motion;Decreased endurance; Impaired balance;Decreased mobility; Decreased cognition; Impaired judgement;Decreased safety awareness;Pain   Assessment Pt is 67 y o  male seen for PT evaluation s/p admit to [facility] on 1/2/[facility]22  Two pt identifiers were used to confirm  Pt presented s/p fall from wheelchair  Pt was admitted with a primary dx of:  Closed fracture of neck of left femur  Patient underwent OPEN REDUCTION W/ INTERNAL FIXATION (ORIF) HIP WITH CANNUALATED SCREWS (Left) which was performed on 1/4/2022  PT now consulted for assessment of mobility and d/c needs  Pt with Up with assistance orders  Pts current co morbidities affecting treatment include:  Alzheimer's disease, anemia, dementia, HLD, HTN  Pts current clinical presentation is Unstable/ Unpredictable (high complexity) due to Ongoing medical management for primary dx, Decreased activity tolerance compared to baseline, Fall risk, Increased assistance needed from caregiver at current time, Cog status, Continuous pulse oximetry monitoring , s/p surgical intervention    Upon evaluation, pt currently is requiring Mod Ax2 for bed mobility; Max Ax2 for sit <-> stand and stand pivot transfers  Pt presents at PT eval functioning below baseline and currently w/ overall mobility deficits 2* to: BLE weakness, decreased ROM, impaired balance, decreased endurance, pain, decreased activity tolerance compared to baseline, decreased safety awareness, impaired judgement, fall risk, decreased cognition   Pt currently at a fall risk 2* to impairments listed above  Based on the aforementioned PT evaluation, pt will continue to benefit from skilled Acute PT interventions to address stated impairments; to maximize functional mobility; for ongoing pt/ family training; and DME needs  At conclusion of PT session pt returned back in chair and chair alarm engaged with phone and call bell within reach  PT is currently recommending Rehab  PT will continue to follow during hospital stay  Barriers to Discharge Inaccessible home environment;Decreased caregiver support   Goals   Patient Goals none stated 2* pts cog status    STG Expiration Date 01/15/22   Short Term Goal #1 In 10 days pt will complete: 1) Bed mobility skills with min Ax1 to increase safety and independence as well as decrease caregiver burden  2) Functional sit <-> stand and stand pivot transfers with min Ax1 to promote increased independence, safety, and QOL  3) Improve balance grades by 1/2 grade to increase safety with all mobility and decrease fall risk  4) Improve BLE strength by 1/2 grade to help increase overall functional mobility and decrease fall risk  5) ambulation to be assessed when appropriate, PT to see at that time   Plan   Treatment/Interventions Functional transfer training;LE strengthening/ROM; Therapeutic exercise; Endurance training;Patient/family training;Equipment eval/education; Bed mobility;Continued evaluation;Spoke to nursing;OT   PT Frequency 3-5x/wk   Recommendation   PT Discharge Recommendation Post acute rehabilitation services   Equipment Recommended   (continue to assess )   AM-PAC Basic Mobility Inpatient   Turning in Bed Without Bedrails 2   Lying on Back to Sitting on Edge of Flat Bed 1   Moving Bed to Chair 1   Standing Up From Chair 1   Walk in Room 1   Climb 3-5 Stairs 1   Basic Mobility Inpatient Raw Score 7   Turning Head Towards Sound 4   Follow Simple Instructions 1   Low Function Basic Mobility Raw Score 12   Low Function Basic Mobility Standardized Score 18 33 Highest Level Of Mobility   [facility] Goal 2: Bed activities/Dependent transfer   Modified Clare Scale   Modified Clare Scale 4   Barthel Index   Feeding 5   Bathing 0   Grooming Score 0   Dressing Score 0   Bladder Score 0   Bowels Score 10   Toilet Use Score 5   Transfers (Bed/Chair) Score 5   Mobility (Level Surface) Score 0   Stairs Score 0   Barthel Index Score 25   Portions of the documentation may have been created using voice recognition software  Occasional wrong word or sound alike substitutions may have occurred due to the inherent limitations of the voice recognition software  Read the chart carefully and recognize, using context, where substitutions have occurred      Kenneth De La Torre, PT, DPT

## 2022-01-05 NOTE — PROGRESS NOTES
Progress Note - Orthopedics   Sheng Alatorre 67 y o  male MRN: [id number]  [facility]      Subjective:    No acute events overnight  No fevers or chills       Labs:  0   Lab Value Date/Time    HCT 34 1 (L) 01/04/2022 0443    HCT 33 2 (L) 01/03/2022 0516    HCT 32 8 (L) 01/02/2022 1808    HGB 10 5 (L) 01/04/2022 0443    HGB 10 5 (L) 01/03/2022 0516    HGB 10 8 (L) 01/02/2022 1808    INR 0 98 01/02/2022 1808    WBC 6 14 01/04/2022 0443    WBC 6 40 01/03/2022 0516    WBC 9 69 01/02/2022 1808       Meds:    Current Facility-Administered Medications:     acetaminophen (TYLENOL) tablet 975 mg, 975 mg, Oral, Q8H [facility], Mihai Rojo MD, 975 mg at 01/04/22 2219    busPIRone (BUSPAR) tablet 5 mg, 5 mg, Oral, BID, Mihai Rojo MD, 5 mg at 01/04/22 2219    ceFAZolin (ANCEF) IVPB (premix in dextrose) 2,000 mg 50 mL, 2,000 mg, Intravenous, Q8H, Mihai Rojo MD, Last Rate: 100 mL/hr at 01/05/22 0608, 2,000 mg at 01/05/22 0608    donepezil (ARICEPT) tablet 10 mg, 10 mg, Oral, Daily, Mihai Rojo MD, 10 mg at 01/03/22 0816    enoxaparin (LOVENOX) subcutaneous injection 40 mg, 40 mg, Subcutaneous, Daily, Mihai Rojo MD    escitalopram (LEXAPRO) tablet 10 mg, 10 mg, Oral, Daily, Mihai Rojo MD, 10 mg at 01/03/22 0816    fluticasone (FLONASE) 50 mcg/act nasal spray 1 spray, 1 spray, Nasal, Daily, Mihai Rojo MD, 1 spray at 01/04/22 0820    HYDROmorphone HCl (DILAUDID) injection 0 2 mg, 0 2 mg, Intravenous, Q2H PRN, Mihai Rojo MD    lactated ringers infusion, 75 mL/hr, Intravenous, Continuous, Elpidio Llanos CRNA    lactated ringers infusion, 125 mL/hr, Intravenous, Continuous, Mihai Rojo MD, Last Rate: 125 mL/hr at 01/05/22 0313, 125 mL/hr at 01/05/22 0313    lidocaine (LIDODERM) 5 % patch 1 patch, 1 patch, Topical, Daily, Mihai Rojo MD    ondansetron [facility]) injection 4 mg, 4 mg, Intravenous, Q6H PRN, Mihai Rojo MD    oxyCODONE (ROXICODONE) immediate release tablet 10 mg, 10 mg, Oral, Q4H PRN, Kassy Lopez MD    oxyCODONE (ROXICODONE) IR tablet 5 mg, 5 mg, Oral, Q4H PRN, Kassy Lopez MD, 5 mg at 01/04/22 2222    pravastatin (PRAVACHOL) tablet 40 mg, 40 mg, Oral, Daily With Dinner, Kassy Lopez MD    QUEtiapine (SEROquel) tablet 25 mg, 25 mg, Oral, HS, Kassy Lopez MD, 25 mg at 01/04/22 2219    senna-docusate sodium (SENOKOT S) 8 6-50 mg per tablet 1 tablet, 1 tablet, Oral, HS, Kassy Lopez MD, 1 tablet at 01/04/22 2219    tamsulosin (FLOMAX) capsule 0 4 mg, 0 4 mg, Oral, Daily, Kassy Lopez MD    Blood Culture:   No results found for: BLOODCX    Wound Culture:   No results found for: WOUNDCULT    Ins and Outs:  I/O last 24 hours: In: 2115 [P O :200; I V :1865; IV Piggyback:50]  Out: 425 [Urine:425]          Physical:  Vitals:    01/05/22 0618   BP: (!) 110/49   Pulse: 64   Resp: 18   Temp: 97 5 °F (36 4 °C)   SpO2: 99%     Musculoskeletal: left Lower Extremity  · Skin intact   · TTP left hip   · Unable to assess motor and sensory secondary to AMS     Assessment:    67 y  o male POD 1 left hip cannulated screw fixation        Plan:  · WBAT LLE   · PT/OT   · Pain control  · DVT ppx   · Dispo: Ortho will follow    Chiol Shay MD

## 2022-01-05 NOTE — ASSESSMENT & PLAN NOTE
-s/p ORIF on 1/4/22  -continue neurovascular checks per protocol  -no acute blood loss anemia  -appears comfortable at rest, consider premedicating with Tylenol before cares in the acute setting to ensure any underlying pain or shortness is treated

## 2022-01-05 NOTE — ASSESSMENT & PLAN NOTE
· Relatively chronic with baseline hemoglobin approximately 9-11  · Monitor for postoperative acute blood loss   · Trend CBC and transfuse as needed

## 2022-01-05 NOTE — PROGRESS NOTES
[address] [facility]  Progress Note July Elise 1945, 67 y o  male MRN: [id number]  Unit/Bed#: [facility] 602-01 Encounter: [id number]  Primary Care Provider: HORACIO Chan   Date and time admitted to hospital: 1/2/2022  2:07 PM    * Closed fracture of neck of left femur [facility])  Assessment & Plan  · Secondary to mechanical fall   · Appreciate ortho evaluation  · S/p left hip cannulated screw fixation on 1/3  · Continue pain control with scheduled Tylenol, Lidoderm patch, prn low dose oxycodone   · Continue with DVT ppx   · PT/OT evaluations pending     Ambulatory dysfunction  Assessment & Plan  · Previous history of right hip fracture status post ORIF on 11/5/2021 and now with left femoral fracture as above   · Fall precautions, safe ambulation  · PT/OT     Alzheimer's disease ([facility] [address] )  Assessment & Plan  · At baseline patient is nonverbal with agitation at night  · Continue home meds  · Continue supportive care  · Geriatrics following     Anemia  Assessment & Plan  · Relatively chronic with baseline hemoglobin approximately 9-11  · Monitor for postoperative acute blood loss   · Trend CBC and transfuse as needed       VTE Pharmacologic Prophylaxis: VTE Score: 10 High Risk (Score >/= 5) - Pharmacological DVT Prophylaxis Ordered: enoxaparin (Lovenox)  Sequential Compression Devices Ordered  Patient Centered Rounds: I performed bedside rounds with nursing staff today  Discussions with Specialists or Other Care Team Provider: primary RN, case management     Education and Discussions with Family / Patient: Updated  (wife) via phone  Time Spent for Care: 15 minutes  More than 50% of total time spent on counseling and coordination of care as described above      Current Length of Stay: 3 day(s)  Current Patient Status: Inpatient   Certification Statement: The patient will continue to require additional inpatient hospital stay due to postop monitoring, awaiting therapy evaluations, dispo planning  Discharge Plan: Anticipate discharge in 24-48 hrs to discharge location to be determined pending rehab evaluations  Code Status: Level 3 - DNAR and DNI    Subjective:   Patient is nonverbal  He is awake and alert, lying comfortably in bed, does not appear to be in pain or distress  Objective:     Vitals:   Temp (24hrs), Av 4 °F (36 3 °C), Min:96 6 °F (35 9 °C), Max:98 1 °F (36 7 °C)    Temp:  [96 6 °F (35 9 °C)-98 1 °F (36 7 °C)] 97 5 °F (36 4 °C)  HR:  [] 64  Resp:  [12-22] 18  BP: (110-162)/() 110/49  SpO2:  [90 %-100 %] 99 %  There is no height or weight on file to calculate BMI  Input and Output Summary (last 24 hours): Intake/Output Summary (Last 24 hours) at 2022 0845  Last data filed at 2022 4465  Gross per 24 hour   Intake  42 ml   Output --   Net  42 ml       Physical Exam:   Physical Exam  Vitals and nursing note reviewed  Constitutional:       General: He is not in acute distress  Cardiovascular:      Rate and Rhythm: Normal rate and regular rhythm  Pulses: Normal pulses  Heart sounds: No murmur heard  Pulmonary:      Effort: Pulmonary effort is normal  No respiratory distress  Breath sounds: No wheezing, rhonchi or rales  Comments: Poor patient effort  Abdominal:      General: Abdomen is flat  There is no distension  Palpations: Abdomen is soft  Tenderness: There is no abdominal tenderness  Comments: No grimace to palpation    Genitourinary:     Comments: External urinary catheter with clear yellow urine output  Musculoskeletal:      Right lower leg: No edema  Left lower leg: No edema  Skin:     General: Skin is warm and dry  Coloration: Skin is pale  Findings: No erythema  Neurological:      General: No focal deficit present  Mental Status: He is alert  Mental status is at baseline        Comments: Nonverbal at basline         Additional Data: Labs:  Results from last 7 days   Lab Units 01/05/22  0440   WBC Thousand/uL 5 75   HEMOGLOBIN g/dL 9 3*   HEMATOCRIT % 30 4*   PLATELETS Thousands/uL 210   NEUTROS PCT % 62   LYMPHS PCT % 24   MONOS PCT % 9   EOS PCT % 4     Results from last 7 days   Lab Units 01/05/22  0440   SODIUM mmol/L 142   POTASSIUM mmol/L 4 6   CHLORIDE mmol/L 111*   CO2 mmol/L 27   BUN mg/dL 16   CREATININE mg/dL 0 59*   ANION GAP mmol/L 4   CALCIUM mg/dL 8 7   GLUCOSE RANDOM mg/dL 80     Results from last 7 days   Lab Units 01/02/22  1808   INR  0 98     Results from last 7 days   Lab Units 01/04/22  1615   POC GLUCOSE mg/dl 85               Lines/Drains:  Invasive Devices  Report    Peripheral Intravenous Line            Peripheral IV 01/04/22 Left Forearm <1 day          Drain            External Urinary Catheter Large <1 day                      Imaging: No pertinent imaging reviewed      Recent Cultures (last 7 days):         Last 24 Hours Medication List:   Current Facility-Administered Medications   Medication Dose Route Frequency Provider Last Rate    acetaminophen  975 mg Oral Pending sale to [facility] Marthe Krabbe, MD      busPIRone  5 mg Oral BID Marthe Krabbe, MD      donepezil  10 mg Oral Daily Marthe Krabbe, MD      enoxaparin  40 mg Subcutaneous Daily Marthe Krabbe, MD      escitalopram  10 mg Oral Daily Marthe Krabbe, MD      fluticasone  1 spray Nasal Daily Marthe Krabbe, MD      HYDROmorphone  0 2 mg Intravenous Q2H PRN Marthe Krabbe, MD      lactated ringers  75 mL/hr Intravenous Continuous Lexy Jimenez CRNA      lactated ringers  125 mL/hr Intravenous Continuous Marthe Krabbe,  mL/hr (01/05/22 5670)    lidocaine  1 patch Topical Daily Marthe Krabbe, MD      ondansetron  4 mg Intravenous Q6H PRN Marthe Krabbe, MD      oxyCODONE  10 mg Oral Q4H PRN Marthe Krabbe, MD      oxyCODONE  5 mg Oral Q4H PRN Marthe Krabbe, MD      pravastatin  40 mg Oral Daily With [employer] Miguel Cintron MD      QUEtiapine  25 mg Oral HS Miguel Cintron MD      senna-docusate sodium  1 tablet Oral HS Miguel Cintron MD      tamsulosin  0 4 mg Oral Daily Miguel Cintron MD          Today, Patient Was Seen By: Jo Arnold PA-C    **Please Note: This note may have been constructed using a voice recognition system  **

## 2022-01-05 NOTE — PLAN OF CARE
Problem: PHYSICAL THERAPY ADULT  Goal: Performs mobility at highest level of function for planned discharge setting  See evaluation for individualized goals  Description: Treatment/Interventions: Functional transfer training,LE strengthening/ROM,Therapeutic exercise,Endurance training,Patient/family training,Equipment eval/education,Bed mobility,Continued evaluation,Spoke to nursing,OT  Equipment Recommended:  (continue to assess )       See flowsheet documentation for full assessment, interventions and recommendations  Note: Prognosis: Fair  Problem List: Decreased strength,Decreased range of motion,Decreased endurance,Impaired balance,Decreased mobility,Decreased cognition,Impaired judgement,Decreased safety awareness,Pain  Assessment: Pt is 67 y o  male seen for PT evaluation s/p admit to [facility] on 1/2/2022  Two pt identifiers were used to confirm  Pt presented s/p fall from wheelchair  Pt was admitted with a primary dx of:  Closed fracture of neck of left femur  Patient underwent OPEN REDUCTION W/ INTERNAL FIXATION (ORIF) HIP WITH CANNUALATED SCREWS (Left) which was performed on 1/4/2022  PT now consulted for assessment of mobility and d/c needs  Pt with Up with assistance orders  Pts current co morbidities affecting treatment include:  Alzheimer's disease, anemia, dementia, HLD, HTN  Pts current clinical presentation is Unstable/ Unpredictable (high complexity) due to Ongoing medical management for primary dx, Decreased activity tolerance compared to baseline, Fall risk, Increased assistance needed from caregiver at current time, Cog status, Continuous pulse oximetry monitoring , s/p surgical intervention    Upon evaluation, pt currently is requiring Mod Ax2 for bed mobility; Max Ax2 for sit <-> stand and stand pivot transfers   Pt presents at PT eval functioning below baseline and currently w/ overall mobility deficits 2* to: BLE weakness, decreased ROM, impaired balance, decreased endurance, pain, decreased activity tolerance compared to baseline, decreased safety awareness, impaired judgement, fall risk, decreased cognition  Pt currently at a fall risk 2* to impairments listed above  Based on the aforementioned PT evaluation, pt will continue to benefit from skilled Acute PT interventions to address stated impairments; to maximize functional mobility; for ongoing pt/ family training; and DME needs  At conclusion of PT session pt returned back in chair and chair alarm engaged with phone and call bell within reach  PT is currently recommending Rehab  PT will continue to follow during hospital stay  Barriers to Discharge: Inaccessible home environment,Decreased caregiver support        PT Discharge Recommendation: Post acute rehabilitation services          See flowsheet documentation for full assessment

## 2022-01-05 NOTE — ASSESSMENT & PLAN NOTE
-severe and progressive  -at baseline completely dependent for all cares and primarily nonverbal- wife is primary caregiver, very high risk caregiver burnout - looking into long-term care placement  -home regimen includes Aricept and maintained on Seroquel for agitation and behaviors  -hx encephalopathy/agitation during previous hospitalizations- increasing risk of recurrence during current hospitalization  -minimize transitions between rooms, units, facilities especially late afternoon/early evening to reduce risk of precipitating sundowning behaviors  -encourage continuity among staff and caregivers during hospitalization   -maintain calm and quiet environment reduce risk overstimulation

## 2022-01-05 NOTE — ASSESSMENT & PLAN NOTE
-multifactorial including history of encephalopathy during previous hospitalizations  -continue home donepezil and Seroquel regimen, if needed in short-term good consider low-dose Zyprexa 2 5mg Q8H PRN agitation unable to be redirected with conservative measures, please ensure pain and all other physiologic needs met and addressed before consideration of administration of medication for agitation as they are frequent precipitants of encephalopathy/agitation in patient sore unable to clearly communicate these needs due to underlying dementia/cognitive impairment

## 2022-01-05 NOTE — ASSESSMENT & PLAN NOTE
-wife is primary caregiver at very high risk of caregiver burnout -considering long-term placement  -CM assisting with hospital dc planning, if bed at long term place of choice not available recommend SNF STR in the interim while family arranging long term placement plan  -continue psychosocial supports of patient and caregiver - caregiver resource packet compiled and will be provided to family on discharge

## 2022-01-05 NOTE — ASSESSMENT & PLAN NOTE
-reportedly mechanical fall at home 1/2/22  -head strike reported, unknown loss of conscious  -injuries as outlined below  -primarily wheelchair-bound at baseline and remains high risk recurrent falls in future due to deconditioning debility, poor safety awareness, impulsivity and underlying dementia  -continue fall precautions  -PT and OT following, appreciate input

## 2022-01-05 NOTE — ASSESSMENT & PLAN NOTE
-severe and progressive, remains dependent for all cares  -home regimen includes Aricept and Seroquel - Buspar added earlier in admission with notable benefit, continue current regimen   -continue distraction techniques roney with changing and repositioning to reduce stressors to patient   -remains off soft restraints and has been doing extremely well with redirection  -maintain calm and quiet environment reduce risk overstimulation

## 2022-01-05 NOTE — APP STUDENT NOTE
S: Patient is non-verbal     O:  Vitals: Temp  97 5 F, /49, P 64, RR 18, SpO2 99%  Gen: frail elderly man lying comfortably in bed, in no apparent distress or pain  Skin: dressing overlying left hip clean, dry, and intact  Cardio: RRR, no m/r/g  Pulm: CTA b/l, no w/r/r  Abd: soft, non-tender, non-distended, no grimace to palpation  : external urinary catheter producing clear and yellow urine  Neuro: Non-verbal baseline  Labs: WBC 5 75, RBC 3 29, Hb 9 3, Hct: 30 4    A/P:  1  Closed fracture of neck of left femur  - S/p left hip cannulated screw fixation on 1/4/22, ortho following  - Pain management with Tylenol, Lidoderm patch and low-dose oxycodone PRN  - DVT ppx  - Pending PT/OT eval    2  Ambulatory dysfunction  - Fall precautions  - Pending PT/OT eval    3  Alzheimer's disease  - Continue supportive care and home medications  - Geriatrics following    4   Anemia  - Chronic with average Hb 9-11  - Trend CBC

## 2022-01-05 NOTE — OCCUPATIONAL THERAPY NOTE
Occupational Therapy Evaluation     Patient Name: Ishmael Foster  WKSAJ'P Date: 1/5/2022  Problem List  Principal Problem:    Closed fracture of neck of left femur ([facility] [address] )  Active Problems:    Alzheimer's disease ([facility] [address] )    Anemia    Sacral decubitus ulcer, stage II (HCC)    Ambulatory dysfunction    Acute pain due to trauma    At high risk for caregiver role strain    Frailty syndrome in geriatric patient    Impaired vision    High risk of developing delirium     Past Medical History  Past Medical History:   Diagnosis Date    Alzheimer disease ([facility] [address]     Anemia     BPH (benign prostatic hyperplasia)     [facility]     Hyperlipidemia     Hypertension     Memory loss      Past Surgical History  Past Surgical History:   Procedure Laterality Date    COLONOSCOPY  11/19/2019    5 years    NH OPEN RX FEMUR FX+INTRAMED JOSE Right 11/5/2021    Procedure: INSERTION LONG NAIL IM FEMUR ANTEGRADE (TROCHANTERIC); Surgeon: Anthony Miles DO;  Location: [facility];  Service: Orthopedics    REPLACEMENT TOTAL KNEE Right 06/19/2019 01/05/22 0939   OT Last Visit   OT Visit Date 01/05/21   Note Type   Note type Evaluation   Restrictions/Precautions   Weight Bearing Precautions Per Order Yes   LLE Weight Bearing Per Order WBAT   Other Precautions Cognitive; Chair Alarm; Bed Alarm;WBS;Multiple lines; Fall Risk;Pain   Pain Assessment   Pain Assessment Tool FLACC   Pain Location/Orientation Orientation: Left; Location: Hip   Hospital Pain Intervention(s) Repositioned; Ambulation/increased activity; Emotional support   Pain Rating: FLACC (Rest) - Face 0   Pain Rating: FLACC (Rest) - Legs 0   Pain Rating: FLACC (Rest) - Activity 0   Pain Rating: FLACC (Rest) - Cry 0   Pain Rating: FLACC (Rest) - Consolability 0   Score: FLACC (Rest) 0   Pain Rating: FLACC (Activity) - Face 1   Pain Rating: FLACC (Activity) - Legs 1   Pain Rating: FLACC (Activity) - Activity 0   Pain Rating: FLACC (Activity) - Cry 0   Pain Rating: FLACC (Activity) - Consolability 0   Score: FLACC (Activity) 2   Home Living   Type of [address] One level;Stairs to enter with rails   Home Equipment Walker;Cane;Wheelchair-manual;Hospital bed   Prior Function   Level of [place] Needs assistance with IADLs; Needs assistance with ADLs and functional mobility   Lives With Spouse   Receives Help From Family   ADL Assistance Needs assistance   IADLs Needs assistance   Falls in the last 6 months 1 to 4   Vocational Retired   Lifestyle   [employer] [facility] [address] [place]  PT FROM HOME WITH SPOUSE WHO ASSISTS WITH ADLS/IADLS/AMBULATION PTA  UNKNOWN LEVEL OF SUPPORT SPOUSE IS ABLE TO PROVIDE   Reciprocal Relationships LIVES WITH SPOUSE- UNKNOWN LEVEL OF SUPERVISION/ASSIST AVAILABLE   Service to Others BELIEVED TO BE RETIRED   Intrinsic Gratification PT OBSERVED ATTEMPTED TO HOLD HANDS AND FIDGET WITH ITEMS T/O SESSION   ADL   Eating Assistance 3  Moderate Assistance   Grooming Assistance 3  Moderate Assistance   UB Bathing Assistance 2  Maximal Assistance   LB Bathing Assistance 2  Maximal Assistance   UB Dressing Assistance 2  Maximal Assistance   LB Dressing Assistance 2  Maximal [address]  2  Maximal Assistance   Functional Assistance 2  Maximal Assistance   Bed Mobility   Supine to Sit 3  Moderate assistance   Additional items Assist x 2; Increased time required;Verbal cues;LE management   Sit to Supine Unable to assess  (PT LEFT OOB WITH ALARM ON AND ALL NEEDS IN REACH )   Transfers   Sit to Stand 2  Maximal assistance   Additional items Assist x 2; Increased time required;Verbal cues   Stand to Sit 2  Maximal assistance   Additional items Assist x 2; Increased time required;Verbal cues   Stand pivot 2  Maximal assistance   Additional items Assist x 2; Increased time required;Verbal cues   Balance   Static Sitting Poor +   Static Standing Poor -   Dynamic Standing Poor -   Activity Tolerance Activity Tolerance Patient limited by fatigue;Patient limited by pain  (COG)   Medical Staff Made Aware PT SEEN FOR CO-SESSION WITH SKILLED PHYSICAL THERAPIST 2' CLINICALLY UNSTABLE PRESENTATION, TRAUMATIC INJURIES, NEW PRECAUTIONS/LIMITATIONS, LIMITED ACTIVITY TOLERANCE AND PRESENT IMPAIRMENTS WHICH ARE A REGRESSION FROM THE PT'S BASELINE AND IMPACTING OVERALL OCCUPATIONAL PERFORMANCE  Nurse Made Aware APPROPRIATE TO SEE    RUE Assessment   RUE Assessment WFL   LUE Assessment   LUE Assessment WFL   Cognition   Overall Cognitive Status Impaired   Arousal/Participation Responsive   Attention Difficulty attending to directions   Orientation Level Oriented to person;Disoriented to place; Disoriented to time;Disoriented to situation  (RESPONDS TO NAME ONLY  )   Memory Decreased recall of precautions;Decreased recall of recent events;Decreased short term memory;Decreased recall of biographical information;Decreased long term memory   Following Commands Follows one step commands inconsistently   Comments PT WITH BASELINE ALZHEIMERS DISEASE  DISORIENTED X4  ABLE TO FOLLOW SIMPLE ONE STEP COMMANDS LESS THAN 25% OF THE TIME  LIMITED INSIGHT/JUDGEMENT/SAFETY AWARENESS  ALARM ON FOR SAFETY AT ALL TIMES  RECOMMEND LIGHTS ON/BLINDS OPEN DURING DAY TIME HOURS TO PROMOTE PROPER SLEEP/WAKE CYCLE  Assessment   Limitation Decreased ADL status; Decreased Safe judgement during ADL;Decreased cognition;Decreased endurance;Decreased high-level ADLs; Decreased self-care trans   Prognosis Fair;Guarded   Assessment 66 YO Male SEEN FOR INITIAL OCCUPATIONAL THERAPY EVALUATION FOLLOWING ADMISSION TO [facility] S/P FALL FROM W/C RESULTING IN CLOSED FC OF NECK OF L FEMUR  PT IS S/P ORIF OF L HIP WITH CANNUALATED SCREWS ON 1/4/21  PER ORTHO, PT IS WBAT ON LLE   PROBLEMS LIST INCLUDES  has a past medical history of Alzheimer disease ([facility] [address] Anemia, BPH (benign prostatic hyperplasia), Dementia ([facility] [address] ), Hyperlipidemia, Hypertension, and Memory loss  PT IS A POOR HISTORIAN, PRIMARILY MINIMALLY VERBAL/ INCOMPREHENSIBLE SPEECH T/O SESSION- INFORMATION OBTAINED FROM PT'S CHART  PT IS FROM HOME WITH SPOUSE WHO PROVIDES ASSISTS WITH ADLS/IADLS/AMBULATION AT BASELINE HOWEVER UNKNOWN LEVEL OF ASSIST/SUPERVISION AVAILABLE  PT CURRENTLY REQUIRES OVERALL MAX A WITH ADLS, AND MAX A X2 WITH SIT<->STAND TRANSFERS AND SPT FROM BED->DROP ARM CHAIR WITHOUT USE OF AD  PT IS LIMITED 2' PAIN, FATIGUE, IMPAIRED BALANCE, FALL RISK , LIMITED SAFETY AWARENESS/INSIGHT/JUDGEMENT, DISORIENTATION, DIFFICULTY FOLLOWING COMMANDS, DIFFICULTY ATTENDING TO TASK, ORTHOPEDIC RESTRICTIONS, OVERALL WEAKNESS/DECONDITIONING , LIMITED PLOF, INACCESSIBLE HOME ENVIRONMENT and OVERALL LIMITED ACTIVITY TOLERANCE  The patient's raw score on the AM-PAC Daily Activity inpatient short form is 11, standardized score is 29 04, less than 39 4  Patients at this level are likely to benefit from discharge to post-acute rehabilitation services  Please refer to the recommendation of the Occupational Therapist for safe discharge planning  FROM AN OCCUPATIONAL THERAPY PERSPECTIVE, PT WOULD BENEFIT FROM ADDITIONAL OT SERVICES IN AN INPT REHAB SETTING UPON D/C VS POSSIBLE NEED FOR LT PLACEMENT PENDING FAMILY ABILITY TO ASSIST  WILL CONT TO FOLLOW TO ADDRESS THE BELOW DESCRIBED GOALS  Goals   Patient Goals NONE EXPRESSED 2' COG    LTG Time Frame 10-14   Long Term Goal #1 SEE BELOW    Plan   Treatment Interventions ADL retraining;Functional transfer training; Endurance training;Cognitive reorientation;Patient/family training;Equipment evaluation/education; Compensatory technique education; Energy conservation; Activityengagement   Goal Expiration Date 01/19/22   OT Frequency 2-3x/wk   Recommendation   Recommendation Geriatric Consult  (PT BEING FOLLOWED BY GERIATRICS )   OT Discharge Recommendation Post acute rehabilitation services   OT - OK to Discharge Yes   AM-PAC Daily Activity Inpatient   Lower Body Dressing 1   Bathing 2   Toileting 2   Upper Body Dressing 2   Grooming 2   Eating 2   Daily Activity Raw Score 11   Daily Activity Standardized Score (Calc for Raw Score >=11) 29 04   AM-PAC Applied Cognition Inpatient   Following a Speech/Presentation 1   Understanding Ordinary Conversation 2   Taking Medications 1   Remembering Where Things Are Placed or Put Away 1   Remembering List of 4-5 Errands 1   Taking Care of Complicated Tasks 1   Applied Cognition Raw Score 7   Applied Cognition Standardized Score 15 17   Modified [place] Scale   Modified [place] Scale 4       OCCUPATIONAL THERAPY GOALS TO BE MET WITHIN 14 DAYS:    -Pt will increase bed mobility to MOD A to participate in functional activities   -Pt will improve functional mobility and transfers to MOD A on/off all surfaces including toileting in order to lessen caregiver burden   -Pt will tolerate sitting EOB for ~20 minutes at S LEVEL in order to increase participation in self-care tasks  -Pt will increase independence in UB ADLS to MIN A in order to lessen caregiver burden   -Pt will improve activity tolerance for 20min txment sessions   -Caregiver will be 100% attentive during caregiver training in order to assist with pt safety upon d/c      Documentation completed by Julieta Abraham, [facility], OTR/L  [address] [place] [address] Certified ID# [id number]

## 2022-01-05 NOTE — ASSESSMENT & PLAN NOTE
-requires use of corrective lenses, continue use at all appropriate times  -consider large font for printed materials provided to patient

## 2022-01-05 NOTE — CONSULTS
Duplicate order, please see consult completed 1/3/21 and follow-up progress note from earlier today

## 2022-01-05 NOTE — PLAN OF CARE
Problem: OCCUPATIONAL THERAPY ADULT  Goal: Performs self-care activities at highest level of function for planned discharge setting  See evaluation for individualized goals  Description: Treatment Interventions: ADL retraining,Functional transfer training,Endurance training,Cognitive reorientation,Patient/family training,Equipment evaluation/education,Compensatory technique education,Energy conservation,Activityengagement          See flowsheet documentation for full assessment, interventions and recommendations  Note: Limitation: Decreased ADL status,Decreased Safe judgement during ADL,Decreased cognition,Decreased endurance,Decreased high-level ADLs,Decreased self-care trans  Prognosis: Fair,Guarded  Assessment: 68 YO Male SEEN FOR INITIAL OCCUPATIONAL THERAPY EVALUATION FOLLOWING ADMISSION TO [facility] S/P FALL FROM W/C RESULTING IN CLOSED FC OF NECK OF L FEMUR  PT IS S/P ORIF OF L HIP WITH CANNUALATED SCREWS ON 1/4/21  PER ORTHO, PT IS WBAT ON LLE  PROBLEMS LIST INCLUDES  has a past medical history of Alzheimer disease ([facility] [address] ), Anemia, BPH (benign prostatic hyperplasia), Dementia ([facility] [address] ), Hyperlipidemia, Hypertension, and Memory loss  PT IS A POOR HISTORIAN, PRIMARILY MINIMALLY VERBAL/ INCOMPREHENSIBLE SPEECH T/O SESSION- INFORMATION OBTAINED FROM PT'S CHART  PT IS FROM HOME WITH SPOUSE WHO PROVIDES ASSISTS WITH ADLS/IADLS/AMBULATION AT BASELINE HOWEVER UNKNOWN LEVEL OF ASSIST/SUPERVISION AVAILABLE  PT CURRENTLY REQUIRES OVERALL MAX A WITH ADLS, AND MAX A X2 WITH SIT<->STAND TRANSFERS AND SPT FROM BED->DROP ARM CHAIR WITHOUT USE OF AD  PT IS LIMITED 2' PAIN, FATIGUE, IMPAIRED BALANCE, FALL RISK , LIMITED SAFETY AWARENESS/INSIGHT/JUDGEMENT, DISORIENTATION, DIFFICULTY FOLLOWING COMMANDS, DIFFICULTY ATTENDING TO TASK, ORTHOPEDIC RESTRICTIONS, OVERALL WEAKNESS/DECONDITIONING , LIMITED PLOF, INACCESSIBLE HOME ENVIRONMENT and OVERALL LIMITED ACTIVITY TOLERANCE   The patient's raw score on the AM-PAC Daily Activity inpatient short form is 11, standardized score is 29 04, less than 39 4  Patients at this level are likely to benefit from discharge to post-acute rehabilitation services  Please refer to the recommendation of the Occupational Therapist for safe discharge planning  FROM AN OCCUPATIONAL THERAPY PERSPECTIVE, PT WOULD BENEFIT FROM ADDITIONAL OT SERVICES IN AN INPT REHAB SETTING UPON D/C VS POSSIBLE NEED FOR LT PLACEMENT PENDING FAMILY ABILITY TO ASSIST  WILL CONT TO FOLLOW TO ADDRESS THE BELOW DESCRIBED GOALS  Recommendation: Geriatric Consult (PT BEING FOLLOWED BY GERIATRICS )  OT Discharge Recommendation: Post acute rehabilitation services  OT - OK to Discharge:  Yes

## 2022-01-05 NOTE — PLAN OF CARE
Problem: PAIN - ADULT  Goal: Verbalizes/displays adequate comfort level or baseline comfort level  Description: Interventions:  - Encourage patient to monitor pain and request assistance  - Assess pain using appropriate pain scale  - Administer analgesics based on type and severity of pain and evaluate response  - Implement non-pharmacological measures as appropriate and evaluate response  - Consider cultural and social influences on pain and pain management  - Notify physician/advanced practitioner if interventions unsuccessful or patient reports new pain  Outcome: Progressing     Problem: INFECTION - ADULT  Goal: Absence or prevention of progression during hospitalization  Description: INTERVENTIONS:  - Assess and monitor for signs and symptoms of infection  - Monitor lab/diagnostic results  - Monitor all insertion sites, i e  indwelling lines, tubes, and drains  - Monitor endotracheal if appropriate and nasal secretions for changes in amount and color  - [place] appropriate cooling/warming therapies per order  - Administer medications as ordered  - Instruct and encourage patient and family to use good hand hygiene technique  - Identify and instruct in appropriate isolation precautions for identified infection/condition  Outcome: Progressing  Goal: Absence of fever/infection during neutropenic period  Description: INTERVENTIONS:  - Monitor WBC    Outcome: Progressing     Problem: SAFETY ADULT  Goal: Patient will remain free of falls  Description: INTERVENTIONS:  - Educate patient/family on patient safety including physical limitations  - Instruct patient to call for assistance with activity   - Consult OT/PT to assist with strengthening/mobility   - Keep Call bell within reach  - Keep bed low and locked with side rails adjusted as appropriate  - Keep care items and personal belongings within reach  - Initiate and maintain comfort rounds  - Make Fall Risk Sign visible to staff  - Offer Toileting every 3 Hours, in advance of need  - Initiate/Maintain bed alarm  - Obtain necessary fall risk management equipment:   - Apply yellow socks and bracelet for high fall risk patients  - Consider moving patient to room near nurses station  Outcome: Progressing  Goal: Maintain or return to baseline ADL function  Description: INTERVENTIONS:  -  Assess patient's ability to carry out ADLs; assess patient's baseline for ADL function and identify physical deficits which impact ability to perform ADLs (bathing, care of mouth/teeth, toileting, grooming, dressing, etc )  - Assess/evaluate cause of self-care deficits   - Assess range of motion  - Assess patient's mobility; develop plan if impaired  - Assess patient's need for assistive devices and provide as appropriate  - Encourage maximum independence but intervene and supervise when necessary  - Involve family in performance of ADLs  - Assess for home care needs following discharge   - Consider OT consult to assist with ADL evaluation and planning for discharge  - Provide patient education as appropriate  Outcome: Progressing  Goal: Maintains/Returns to pre admission functional level  Description: INTERVENTIONS:  - Perform BMAT or MOVE assessment daily    - Set and communicate daily mobility goal to care team and patient/family/caregiver  - Collaborate with rehabilitation services on mobility goals if consulted  - Perform Range of Motion 3 times a day  - Reposition patient every 3 hours    - Dangle patient 3 times a day  - Stand patient 3 times a day  - Ambulate patient 3 times a day  - Out of bed to chair 3 times a day   - Out of bed for meals 3 times a day  - Out of bed for toileting  - Record patient progress and toleration of activity level   Outcome: Progressing

## 2022-01-06 ENCOUNTER — PATIENT OUTREACH (OUTPATIENT)
Dept: CASE MANAGEMENT | Facility: HOSPITAL | Age: 77
End: 2022-01-06

## 2022-01-06 LAB
ANION GAP SERPL CALCULATED.3IONS-SCNC: 5 MMOL/L (ref 4–13)
BASOPHILS # BLD AUTO: 0.04 THOUSANDS/ΜL (ref 0–0.1)
BASOPHILS NFR BLD AUTO: 1 % (ref 0–1)
BUN SERPL-MCNC: 12 MG/DL (ref 5–25)
CALCIUM SERPL-MCNC: 9.2 MG/DL (ref 8.3–10.1)
CHLORIDE SERPL-SCNC: 108 MMOL/L (ref 100–108)
CO2 SERPL-SCNC: 25 MMOL/L (ref 21–32)
CREAT SERPL-MCNC: 0.47 MG/DL (ref 0.6–1.3)
EOSINOPHIL # BLD AUTO: 0.24 THOUSAND/ΜL (ref 0–0.61)
EOSINOPHIL NFR BLD AUTO: 5 % (ref 0–6)
ERYTHROCYTE [DISTWIDTH] IN BLOOD BY AUTOMATED COUNT: 13.9 % (ref 11.6–15.1)
GFR SERPL CREATININE-BSD FRML MDRD: 107 ML/MIN/1.73SQ M
GLUCOSE SERPL-MCNC: 97 MG/DL (ref 65–140)
HCT VFR BLD AUTO: 27.5 % (ref 36.5–49.3)
HGB BLD-MCNC: 8.7 G/DL (ref 12–17)
IMM GRANULOCYTES # BLD AUTO: 0.02 THOUSAND/UL (ref 0–0.2)
IMM GRANULOCYTES NFR BLD AUTO: 0 % (ref 0–2)
LYMPHOCYTES # BLD AUTO: 1.31 THOUSANDS/ΜL (ref 0.6–4.47)
LYMPHOCYTES NFR BLD AUTO: 25 % (ref 14–44)
MCH RBC QN AUTO: 28.6 PG (ref 26.8–34.3)
MCHC RBC AUTO-ENTMCNC: 31.6 G/DL (ref 31.4–37.4)
MCV RBC AUTO: 91 FL (ref 82–98)
MONOCYTES # BLD AUTO: 0.53 THOUSAND/ΜL (ref 0.17–1.22)
MONOCYTES NFR BLD AUTO: 10 % (ref 4–12)
NEUTROPHILS # BLD AUTO: 3.11 THOUSANDS/ΜL (ref 1.85–7.62)
NEUTS SEG NFR BLD AUTO: 59 % (ref 43–75)
NRBC BLD AUTO-RTO: 0 /100 WBCS
PLATELET # BLD AUTO: 209 THOUSANDS/UL (ref 149–390)
PMV BLD AUTO: 10.1 FL (ref 8.9–12.7)
POTASSIUM SERPL-SCNC: 3.6 MMOL/L (ref 3.5–5.3)
RBC # BLD AUTO: 3.04 MILLION/UL (ref 3.88–5.62)
SODIUM SERPL-SCNC: 138 MMOL/L (ref 136–145)
WBC # BLD AUTO: 5.25 THOUSAND/UL (ref 4.31–10.16)

## 2022-01-06 PROCEDURE — 85025 COMPLETE CBC W/AUTO DIFF WBC: CPT | Performed by: STUDENT IN AN ORGANIZED HEALTH CARE EDUCATION/TRAINING PROGRAM

## 2022-01-06 PROCEDURE — 99232 SBSQ HOSP IP/OBS MODERATE 35: CPT | Performed by: INTERNAL MEDICINE

## 2022-01-06 PROCEDURE — 99223 1ST HOSP IP/OBS HIGH 75: CPT | Performed by: PHYSICAL MEDICINE & REHABILITATION

## 2022-01-06 PROCEDURE — NC001 PR NO CHARGE: Performed by: ORTHOPAEDIC SURGERY

## 2022-01-06 PROCEDURE — 80048 BASIC METABOLIC PNL TOTAL CA: CPT | Performed by: STUDENT IN AN ORGANIZED HEALTH CARE EDUCATION/TRAINING PROGRAM

## 2022-01-06 RX ADMIN — SENNOSIDES AND DOCUSATE SODIUM 1 TABLET: 50; 8.6 TABLET ORAL at 21:15

## 2022-01-06 RX ADMIN — ESCITALOPRAM OXALATE 10 MG: 10 TABLET ORAL at 08:43

## 2022-01-06 RX ADMIN — BUSPIRONE HYDROCHLORIDE 5 MG: 5 TABLET ORAL at 21:15

## 2022-01-06 RX ADMIN — LIDOCAINE 5% 1 PATCH: 700 PATCH TOPICAL at 08:44

## 2022-01-06 RX ADMIN — ACETAMINOPHEN 975 MG: 325 TABLET, FILM COATED ORAL at 21:15

## 2022-01-06 RX ADMIN — ACETAMINOPHEN 975 MG: 325 TABLET, FILM COATED ORAL at 13:10

## 2022-01-06 RX ADMIN — ACETAMINOPHEN 975 MG: 325 TABLET, FILM COATED ORAL at 05:20

## 2022-01-06 RX ADMIN — DONEPEZIL HYDROCHLORIDE 10 MG: 10 TABLET ORAL at 08:43

## 2022-01-06 RX ADMIN — QUETIAPINE FUMARATE 25 MG: 25 TABLET ORAL at 21:15

## 2022-01-06 RX ADMIN — TAMSULOSIN HYDROCHLORIDE 0.4 MG: 0.4 CAPSULE ORAL at 08:43

## 2022-01-06 RX ADMIN — OXYCODONE HYDROCHLORIDE 5 MG: 5 TABLET ORAL at 17:44

## 2022-01-06 RX ADMIN — ENOXAPARIN SODIUM 40 MG: 40 INJECTION SUBCUTANEOUS at 08:43

## 2022-01-06 RX ADMIN — PRAVASTATIN SODIUM 40 MG: 40 TABLET ORAL at 17:44

## 2022-01-06 RX ADMIN — BUSPIRONE HYDROCHLORIDE 5 MG: 5 TABLET ORAL at 08:43

## 2022-01-06 RX ADMIN — FLUTICASONE PROPIONATE 1 SPRAY: 50 SPRAY, METERED NASAL at 08:44

## 2022-01-06 NOTE — ASSESSMENT & PLAN NOTE
Relatively chronic with baseline hemoglobin approximately 9-11  · Monitor for postoperative acute blood loss, Hgb 8 7 this AM  · Trend CBC and transfuse as needed

## 2022-01-06 NOTE — ASSESSMENT & PLAN NOTE
Secondary to mechanical fall   · Ortho following  · S/p left hip cannulated screw fixation on 1/4  · Continue pain control with scheduled Tylenol, Lidoderm patch, prn low dose oxycodone   · Continue with DVT ppx   · PT/OT recommending rehab, wife interested in [facility], PMR consult pending

## 2022-01-06 NOTE — PROGRESS NOTES
Progress Note - Geriatric Medicine   Que Johnson 67 y o  male MRN: [id number]  Unit/Bed#: [facility] 602-01 Encounter: [id number]      Assessment/Plan:    Ambulatory dysfunction with fall  Assessment & Plan  -reportedly mechanical fall at home 1/2/22  -head strike reported, unknown loss of conscious  -injuries as outlined below  -primarily wheelchair-bound at baseline  -continue fall precautions, PT/OT if patient able to cooperate due to his advanced dementia    * Closed fracture of neck of left femur (HCC)  Assessment & Plan  -s/p ORIF on 1/4/22  -continue neurovascular checks per protocol  -monitor for acute blood loss anemia - hemoglobin currently appears to be stable  -does not appear to be in acute pain at rest    Acute pain due to trauma  Assessment & Plan  -continue multimodal pain control per geriatric pain protocol  -recommend bowel regimen to reduce risk constipation associated with opiate pain medication use    Alzheimer's disease ([facility] [address] )  Assessment & Plan  -severe and progressive  -at baseline completely dependent for all cares and primarily nonverbal- wife is primary caregiver, very high risk caregiver burnout - looking into long-term care placement  -home regimen includes Aricept and Seroquel - may require PRN regimen with Zyprexa during hospitalization given high risk of encephalopathy/behavioral disturbances with unfamiliar environment  -hx encephalopathy/agitation during previous hospitalizations further increases risk of recurrence during current hospitalization  -maintain calm and quiet environment reduce risk overstimulation    High risk of developing delirium   Assessment & Plan  -due to age, underlying dementia and comorbidities now s/p anesthesia in patient with history of acute metabolic encephalopathy during previous hospitalizations  -continue home donepezil and Seroquel regimen, if absolutely necessary consider low-dose Zyprexa 2 5mg Q8H PRN agitation unable to be redirected with conservative measures, please ensure pain and all other physiologic needs met and addressed before consideration of administration of medication for agitation as they are frequent precipitants of encephalopathy/agitation in patient sore unable to clearly communicate these needs due to underlying dementia/cognitive impairment    Impaired vision  Assessment & Plan  -requires use of corrective lenses, encourage use at all appropriate times  -consider large font for printed materials provided to patient    Frailty syndrome in geriatric patient  Assessment & Plan  -clinical frailty scale stage 7/8, severely frail  -multifactorial including advanced dementia, age and multitude of chronic medical co-morbidities  -continue optimization of nutritional intake and chronic conditions as possible    At high risk for caregiver role strain  Assessment & Plan  -wife is primary caregiver at very high risk of caregiver burnout  -cares becoming more than able to be safely managed in the home and family looking into long-term care placement, cm following, appreciate assistance  -continue psychosocial supports of patient and caregiver    Care coordination:  Rounded with Ed Gutierrez (RN)    Subjective:     Patient seen and examined at bedside, his appear to be in any discomfort at rest, is noncommunicative but restless and irritable with cares  No acute events overnight  Review of Systems   Unable to perform ROS: Dementia     Objective:     Vitals: Blood pressure 113/74, pulse 85, temperature 99 °F (37 2 °C), resp  rate 18, SpO2 96 %  ,There is no height or weight on file to calculate BMI  Intake/Output Summary (Last 24 hours) at 1/5/2022 2011  Last data filed at 1/5/2022 1839  Gross per 24 hour   Intake 2995 42 ml   Output --   Net 2995 42 ml     Current Medications: Reviewed    Physical Exam:   Physical Exam  Vitals and nursing note reviewed  Constitutional:       General: He is not in acute distress       Comments: Thin, frail elderly male HENT:      Head: Normocephalic and atraumatic  Nose: Nose normal       Mouth/Throat:      Mouth: Mucous membranes are dry  Eyes:      General: No scleral icterus  Right eye: No discharge  Left eye: No discharge  Conjunctiva/sclera: Conjunctivae normal    Neck:      Comments: Trachea midline   Cardiovascular:      Rate and Rhythm: Normal rate and regular rhythm  Pulses: Normal pulses  Pulmonary:      Effort: Pulmonary effort is normal  No respiratory distress  Abdominal:      General: There is no distension  Palpations: Abdomen is soft  Musculoskeletal:      Cervical back: Neck supple  Right lower leg: No edema  Left lower leg: No edema  Comments: Diffuse subcutaneous fat and muscle wasting   Skin:     General: Skin is warm and dry  Comments: Scattered ecchymoses distal RUE   Neurological:      Mental Status: He is alert  Comments: Awake and alert, confused and irritable, does not follow directions does not answer questions   Psychiatric:         Mood and Affect: Affect is labile  Behavior: Behavior is uncooperative  Cognition and Memory: Cognition is impaired  Judgment: Judgment is impulsive  Invasive Devices  Report    Peripheral Intravenous Line            Peripheral IV 01/04/22 Left Forearm 1 day          Drain            External Urinary Catheter Large <1 day              Lab, Imaging and other studies: I have personally reviewed pertinent reports

## 2022-01-06 NOTE — [facility] ADMISSION
This writer received referral on patients case for possible [facility] placement  [facility] admissions team will continue to review and follow patients progress and correspond with PT/OT therapies / [facility] physician and case management until a determination of acceptance or denial to [facility] is made   Awaiting PM&R consult recommendations at this time

## 2022-01-06 NOTE — WOUND OSTOMY CARE
Consult Note - Wound   Baltazar Belcher 67 y o  male MRN: [id number]  [facility] Encounter: [id number]      History and Present Illness:  Patient is a 67year old male with alzheimer demential who presented s/p fall with L hip fracture  He is awake, alert and oriented in bed, no verbal and non cooperative for assessment  Assessment Findings:   1-Bilatera sacro-buttock region with moisture related skin damage with some thin yellow slough at base of wound  Patient has been experiencing loose stool and is incontinent, he has advance stage dementia with severe immobilty  Heels are intact, no other skin issues, primary RN at bed side during assessment and aware of findings  Skin care plans:  1-Calazime to sacrum, buttocks TID and PRN  2-Hydraguard to bilateral heel BID and PRN  3-Elevate heels to offload pressure  4-Ehob cushion when out of bed  5-Turn/repoisiton q2h or when medically stable for pressure re-distribution on skin  6-Moisturize skin daily with skin nourishing cream    Vitals: Blood pressure 122/56, pulse 55, temperature (!) 97 2 °F (36 2 °C), resp  rate 17, SpO2 100 %  ,There is no height or weight on file to calculate BMI  Wound 11/27/21 MASD Sacrum Mid (Active)   Wound Image   01/06/22 1110   Wound Description Slough; Yellow 01/06/22 1110   Bindu-wound Assessment Erythema; Maceration 01/06/22 1110   Wound Length (cm) 2 5 cm 01/06/22 1110   Wound Width (cm) 6 cm 01/06/22 1110   Wound Surface Area (cm^2) 15 cm^2 01/06/22 1110   Drainage Amount Small 01/06/22 1110   Drainage Description Tan 01/06/22 1110   Non-staged Wound Description Partial thickness 01/06/22 1110   Treatments Cleansed;Site care 01/06/22 1110   Patient Tolerance Tolerated poorly 01/06/22 1110       Wound 01/04/22 Hip Left (Active)   Wound Description Clean;Dry; Intact 01/06/22 0708   Bindu-wound Assessment Clean;Dry; Intact 01/05/22 2010   Wound Site Closure [place]; Open to air 01/06/22 0708   Drainage Amount None 01/05/22 2010   Dressing Open to air 01/06/22 0708   Dressing Status Clean;Dry; Intact 01/06/22 0708           Our skin care recommendations are placed as nursing orders, wound care to continue following, please call or tiger text with questions and concerns          Zeke Tovar, RN, BSN, [facility]

## 2022-01-06 NOTE — ASSESSMENT & PLAN NOTE
Secondary to mechanical fall   · Ortho following  · S/p left hip cannulated screw fixation on 1/4  · Continue pain control with scheduled Tylenol, Lidoderm patch, prn low dose oxycodone   · Continue with DVT ppx   · PT/OT recommending rehab, not appropriate for acute level, SNF recommending  · CM following

## 2022-01-06 NOTE — PROGRESS NOTES
Progress Note - Geriatric Medicine   Otto Bridegroom 67 y o  male MRN: [id number]  Unit/Bed#: [facility] 602-01 Encounter: [id number]      Assessment/Plan:    Ambulatory dysfunction with fall  Assessment & Plan  -reportedly mechanical fall at home 1/2/22  -head strike reported, unknown loss of conscious  -injuries as outlined below  -primarily wheelchair-bound at baseline   -continue fall precautions, PT/OT if patient able to cooperate due to his advanced dementia    * Closed fracture of neck of left femur (HCC)  Assessment & Plan  -s/p ORIF on 1/4/22  -continue neurovascular checks per protocol  -monitor for acute blood loss anemia - hemoglobin currently appears to be stable  -does not appear to be in acute pain at rest    Acute pain due to trauma  Assessment & Plan  -continue multimodal pain control per geriatric pain protocol  -recommend bowel regimen to reduce risk constipation associated with opiate pain medication use    Alzheimer's disease ([facility] [address] )  Assessment & Plan  -severe and progressive  -at baseline completely dependent for all cares and primarily nonverbal- wife is primary caregiver, very high risk caregiver burnout - looking into long-term care placement - will compile caregiver resource packet for wife discharge  -home regimen includes Aricept and Seroquel - if PRN for agitation is needed during hospitalization would consider low-dose Zyprexa  -patient frequently pulling at lines and sheets etc, to engager tactile senses and preoccupy hands patient was provided with dementia sensory nut and both toy which seems to have been helpful  -maintain calm and quiet environment reduce risk overstimulation    High risk of developing delirium   Assessment & Plan  -due to age, underlying dementia and comorbidities now s/p anesthesia in patient with history of acute metabolic encephalopathy during previous hospitalizations  -continue home donepezil and Seroquel regimen, if absolutely necessary consider low-dose Zyprexa 2 5mg Q8H PRN agitation unable to be redirected with conservative measures, please ensure pain and all other physiologic needs met and addressed before consideration of administration of medication for agitation as they are frequent precipitants of encephalopathy/agitation in patient sore unable to clearly communicate these needs due to underlying dementia/cognitive impairment    Impaired vision  Assessment & Plan  -requires use of corrective lenses, continue use at all appropriate times  -consider large font for printed materials provided to patient    Frailty syndrome in geriatric patient  Assessment & Plan  -clinical frailty scale stage 7/8, severely frail  -multifactorial including advanced dementia, age and multitude of chronic medical co-morbidities  -continue optimization of nutritional intake and chronic conditions as possible  -continue to ensure that treatments and interventions along with patient and family wishes and goals of care    At high risk for caregiver role strain  Assessment & Plan  -wife is primary caregiver at very high risk of caregiver burnout  -cares becoming more than able to be safely managed in the home and family looking into long-term care placement, cm following, appreciate assistance  -continue psychosocial supports of patient and caregiver - caregiver resource packet compiled and will be provided to family on discharge    Care coordination:  Rounded with Brit Haas (RN) and John Sierra (SLIM AP)    Subjective:     Patient seen and examined bedside where he sitting resting playing, he appears comfortable in no acute distress, nursing reports no acute events overnight  He is awaiting accepting facility for STR on discharge  Review of Systems   Unable to perform ROS: Dementia     Objective:     Vitals: Blood pressure 122/56, pulse 55, temperature (!) 97 2 °F (36 2 °C), resp  rate 17, SpO2 100 %  ,There is no height or weight on file to calculate BMI        Intake/Output Summary (Last 24 hours) at 1/6/2022 1401  Last data filed at 1/6/2022 1229  Gross per 24 hour   Intake 1704 17 ml   Output 950 ml   Net 754 17 ml     Current Medications: Reviewed    Physical Exam:   Physical Exam  Vitals and nursing note reviewed  Constitutional:       General: He is not in acute distress  Comments: Thin frail elderly male   HENT:      Head: Normocephalic  Nose: Nose normal       Mouth/Throat:      Mouth: Mucous membranes are moist    Eyes:      General: No scleral icterus  Right eye: No discharge  Left eye: No discharge  Conjunctiva/sclera: Conjunctivae normal       Comments: Wearing glasses   Cardiovascular:      Rate and Rhythm: Normal rate  Rhythm irregular  Pulmonary:      Effort: Pulmonary effort is normal  No respiratory distress  Abdominal:      General: There is no distension  Palpations: Abdomen is soft  Musculoskeletal:      Cervical back: Neck supple  Comments: Reduced overall muscle mass   Skin:     General: Skin is warm and dry  Comments: Thin and friable   Neurological:      Mental Status: He is alert  Psychiatric:         Mood and Affect: Mood normal          Behavior: Behavior normal          Cognition and Memory: Cognition is impaired  Invasive Devices  Report    Peripheral Intravenous Line            Peripheral IV 01/04/22 Left Forearm 1 day          Drain            External Urinary Catheter Large 1 day              Lab, Imaging and other studies: I have personally reviewed pertinent reports

## 2022-01-06 NOTE — CONSULTS
PHYSICAL MEDICINE AND REHABILITATION CONSULT NOTE  Angelia Martin 67 y o  male MRN: [id number]  Unit/Bed#: [facility] [place] 602-01 Encounter: [id number]      CC: left femoral neck fracture     History of Present Illness:   Angelia Martin is a 67 y o  male with h/o Alzheimer's disease who presented to the [address] after fall from wheel chair with complaint of left hip pain and inability to bear weight   Patient was found to have a left femoral neck fracture therefore underwent ORIF with cannulated screws by Dr Paresh Strauss on 1/4/22      Location: left hip    Quality: fracture  Severity: severe   Duration: presented on 1/2/22  Timing: acute   Context: fall from wheelchair   Modifying factors: ORIF   Associating signs & symptoms: left hip pain        Subjective: patient is minimally verbal at baseline due to Alzheimer's    Review of Systems: patient is minimally verbal at baseline due to Alzheimer's    Plan:     L femoral neck fracture: s/p ORIF with cannulated screws on 1/4/22 by Dr Paresh Strauss, WBAT per ortho    Ambulatory/ADL dysfunction: patient was requiring assistance with ADLs and functional mobility PTA and lives with spouse in a 1 SH with PHYLLIS, currently patient is max assist x 2 for transfer & max assist for ADLs; given current & prior function as well support system, baseline mental status due to Alzheimer's and home setup recommend subacute rehab in SNF setting        Alzheimer's/mood disorder: at baseline minimally verbal and has sundowning; currently on aricept, lexapro, seroquel, and buspar    BPH: on flomax currently    DL: currently on pravastatin     Pain: currently rating pain as a 6 and has not used any IV pain medications in the last 24 hours   Drug regimen reviewed, all potential adverse effects identified and addressed:    Scheduled Meds:  Current Facility-Administered Medications   Medication Dose Route Frequency Provider Last Rate    acetaminophen  975 mg Oral [facility] Rodrigo Torres MD      busPIRone  5 mg Oral BID Jeremy Rubalcava MD      donepezil  10 mg Oral Daily Jeremy Rubalcava MD      enoxaparin  40 mg Subcutaneous Daily Jeremy Rubalcava MD      escitalopram  10 mg Oral Daily Jeremy Rubalcava MD      fluticasone  1 spray Nasal Daily Jeremy Rubalcava MD      HYDROmorphone  0 2 mg Intravenous Q2H PRN Jeremy Rubalcava MD      lactated ringers  75 mL/hr Intravenous Continuous Pauline Epperson CRNA      lactated ringers  125 mL/hr Intravenous Continuous Jeremy Rubalcava MD Stopped (01/05/22 2010)    lidocaine  1 patch Topical Daily Jeremy Rubalcava MD      ondansetron  4 mg Intravenous Q6H PRN Jeremy Rubalcava MD      oxyCODONE  10 mg Oral Q4H PRN Jeremy Rubalcava MD      oxyCODONE  5 mg Oral Q4H PRN Jeremy Rubalcava MD      pravastatin  40 mg Oral Daily With Billie Lobo MD      QUEtiapine  25 mg Oral HS Jeremy Rubalcava MD      senna-docusate sodium  1 tablet Oral HS Jeremy Rubalcava MD      tamsulosin  0 4 mg Oral Daily Jeremy Rubalcava MD                 Physical Exam:  Vitals:    01/06/22 0620   BP: 122/56   Pulse: 55   Resp: 17   SpO2: 100%         General: NAD  HEENT:  Eye: Normal external eye, conjunctiva, lids cornea  Ears: Normal external ears  Nose: Normal external nose, mucus membranes  Pulmonary: respirations unlabored   Cardiovascular: +S1/2  Abdomen: soft NT   Extremity/skin: no cyanosis or clubbing  Neurologic: patient minimally verbal due to Alzheimer's dz, minimal command following therefore exam limited   Psych: patient minimally verbal due to Alzheimer's dz         Laboratory:    Results from last 7 days   Lab Units 01/06/22  0441 01/05/22  0440 01/04/22  0443   HEMOGLOBIN g/dL 8 7* 9 3* 10 5*   HEMATOCRIT % 27 5* 30 4* 34 1*   WBC Thousand/uL 5 25 5 75 6 14     Results from last 7 days   Lab Units 01/06/22  0441 01/05/22  0440 01/04/22  0443   BUN mg/dL 12 16 13   SODIUM mmol/L 138 142 139   POTASSIUM mmol/L 3 6 4 6 3  6   CHLORIDE mmol/L 108 111* 112*   CREATININE mg/dL 0 47* 0 59* 0 66     Results from last 7 days   Lab Units 22  1808   PROTIME seconds 12 6   INR  0 98            Past Medical History:   Past Surgical History:   Family History:   Social history:   Past Medical History:   Diagnosis Date    [facility] [address] [facility]     Anemia     BPH (benign prostatic hyperplasia)     Dementia ([facility] [address] [facility]     Hyperlipidemia     Hypertension     Memory loss     Past Surgical History:   Procedure Laterality Date    COLONOSCOPY  2019    5 years    NE OPEN RX FEMUR FX+INTRAMED JOSE Right 2021    Procedure: INSERTION LONG NAIL IM FEMUR ANTEGRADE (TROCHANTERIC);   Surgeon: Rosa Del Toro DO;  [facility];  Service: Orthopedics    REPLACEMENT TOTAL KNEE Right 2019     Family History   Problem Relation Age of Onset    Lung disease Father         coal workers' pneumoconiosis      Social History     Socioeconomic History    Marital status: /Civil Union     Spouse name: None    Number of children: None    Years of education: None    Highest education level: None   Occupational History    None   Tobacco Use    Smoking status: Former Smoker     Packs/day: 0 25     Years: 10 00     Pack years: 2 50    Smokeless tobacco: Never Used    Tobacco comment: 1PPW   Vaping Use    Vaping Use: Never used   Substance and Sexual Activity    Alcohol use: Not Currently    Drug use: Not Currently    Sexual activity: Not Currently   Other Topics Concern    None   Social History Narrative    History of Holter Monitor: 2019    History of ECHO: 2019    · Most recent tobacco use screenin2019      · Do you currently or have you served in the [employer]:   No      Social Determinants of Health     Financial Resource Strain: Not on file   Food Insecurity: Not on file   Transportation Needs: Not on file   Physical Activity: Not on file   Stress: Not on file   Social Connections: Not on file Intimate Partner Violence: Not on file   Housing Stability: Not on file          Current Medical Diagnosis Allergies   Patient Active Problem List   Diagnosis    [facility]'s disease ([facility] [address] [facility]    Memory impairment    Hypercholesterolemia    Benign prostatic hyperplasia without lower urinary tract symptoms    Essential hypertension    Elevated fasting glucose    Fall    Closed right hip fracture ([facility] [address] [facility]    Dysphagia    S/P total knee replacement, right    Anemia    Encephalopathy    Sacral decubitus ulcer, stage II (HCC)    Moderate protein-calorie malnutrition (HCC)    Closed fracture of neck of left femur ([facility])    Ambulatory dysfunction    Acute pain due to trauma    At high risk for caregiver role strain    Frailty syndrome in geriatric patient    Impaired vision    High risk of developing delirium     No Known Allergies

## 2022-01-06 NOTE — DISCHARGE INSTR - OTHER ORDERS
Wound Care Plan:   1-Apply Hydraguard lotion to bilateral heels twice daily for skin protection  2-Elevate heels off of bed/chair surface to offload pressure  3-Offloading air cushion in chair when out of bed  4-Moisturize skin daily with skin nourishing cream   5-Turn/reposition every 2 hours while in bed and weight shift frequently while in chair for pressure re-distribution on skin  6-Sacrum/buttocks--cleanse with soap and water, pat dry  Apply thin layer of Calazime paste three times daily and as needed with incontinence care

## 2022-01-06 NOTE — PROGRESS NOTES
[facility]  Progress Note - Radha Barron 1945, 67 y o  male MRN: [id number]  Unit/Bed#: [facility] 602-01 Encounter: [id number]  Primary Care Provider: HORACIO Wu   Date and time admitted to hospital: 1/2/2022  2:07 PM    * Closed fracture of neck of left femur [facility])  Assessment & Plan  Secondary to mechanical fall   · Ortho following  · S/p left hip cannulated screw fixation on 1/4  · Continue pain control with scheduled Tylenol, Lidoderm patch, prn low dose oxycodone   · Continue with DVT ppx   · PT/OT recommending rehab, wife interested in [facility], PMR consult pending     Anemia  Assessment & Plan  Relatively chronic with baseline hemoglobin approximately 9-11  · Monitor for postoperative acute blood loss, Hgb 8 7 this AM  · Trend CBC and transfuse as needed     Alzheimer's disease ([facility] [address] )  Assessment & Plan  At baseline patient is nonverbal with agitation at night  · Continue home meds  · Continue supportive care  · Geriatrics following     Ambulatory dysfunction  Assessment & Plan  Previous history of right hip fracture status post ORIF on 11/5/2021 and now with left femoral fracture as above   · Fall precautions, safe ambulation  · PT/OT, needs rehab     Sacral decubitus ulcer, stage II ([facility] [address] )  Assessment & Plan  · Continue local wound care        VTE Pharmacologic Prophylaxis: VTE Score: 10 Moderate Risk (Score 3-4) - Pharmacological DVT Prophylaxis Ordered: enoxaparin (Lovenox)  Patient Centered Rounds: I performed bedside rounds with nursing staff today  Discussions with Specialists or Other Care Team Provider:  Case management    Education and Discussions with Family / Patient: Updated  (wife) via phone  Time Spent for Care: 30 minutes  More than 50% of total time spent on counseling and coordination of care as described above      Current Length of Stay: 4 day(s)  Current Patient Status: Inpatient   Certification Statement: The patient will continue to require additional inpatient hospital stay due to rehab placement   Discharge Plan: DC when rehab found    Code Status: Level 3 - DNAR and DNI    Subjective:   Cannot provide much history  No issues per RN  Appears comfortable  Objective:     Vitals:   Temp (24hrs), Av 2 °F (36 8 °C), Min:97 2 °F (36 2 °C), Max:99 °F (37 2 °C)    Temp:  [97 2 °F (36 2 °C)-99 °F (37 2 °C)] 97 2 °F (36 2 °C)  HR:  [55-85] 55  Resp:  [17-18] 17  BP: (113-137)/() 122/56  SpO2:  [96 %-100 %] 100 %  There is no height or weight on file to calculate BMI  Input and Output Summary (last 24 hours): Intake/Output Summary (Last 24 hours) at 2022 0916  Last data filed at 2022 0620  Gross per 24 hour   Intake 1964 17 ml   Output 950 ml   Net 1014 17 ml       Physical Exam:   Physical Exam  Vitals and nursing note reviewed  Constitutional:       General: He is not in acute distress  Cardiovascular:      Rate and Rhythm: Normal rate  Abdominal:      General: There is no distension  Tenderness: There is no abdominal tenderness     Genitourinary:     Comments: Condom cath in place  Neurological:      Comments: Minimally verbal, moves all extremities    Psychiatric:      Comments: Calm, appears pleasant           Additional Data:     Labs:  Results from last 7 days   Lab Units 22  0441   WBC Thousand/uL 5 25   HEMOGLOBIN g/dL 8 7*   HEMATOCRIT % 27 5*   PLATELETS Thousands/uL 209   NEUTROS PCT % 59   LYMPHS PCT % 25   MONOS PCT % 10   EOS PCT % 5     Results from last 7 days   Lab Units 22  0441   SODIUM mmol/L 138   POTASSIUM mmol/L 3 6   CHLORIDE mmol/L 108   CO2 mmol/L 25   BUN mg/dL 12   CREATININE mg/dL 0 47*   ANION GAP mmol/L 5   CALCIUM mg/dL 9 2   GLUCOSE RANDOM mg/dL 97     Results from last 7 days   Lab Units 22  1808   INR  0 98     Results from last 7 days   Lab Units 22  1615   POC GLUCOSE mg/dl 85               Lines/Drains:  Invasive Devices  Report Peripheral Intravenous Line            Peripheral IV 01/04/22 Left Forearm 1 day          Drain            External Urinary Catheter Large 1 day                      Imaging: No pertinent imaging reviewed  Recent Cultures (last 7 days):         Last 24 Hours Medication List:   Current Facility-Administered Medications   Medication Dose Route Frequency Provider Last Rate    acetaminophen  975 mg Oral [facility] María Caballero MD      busPIRone  5 mg Oral BID María Caballero MD      donepezil  10 mg Oral Daily María Caballero MD      enoxaparin  40 mg Subcutaneous Daily María Caballero MD      escitalopram  10 mg Oral Daily María Caballero MD      fluticasone  1 spray Nasal Daily María Caballero MD      HYDROmorphone  0 2 mg Intravenous Q2H PRN María Caballero MD      lactated ringers  75 mL/hr Intravenous Continuous Heena Farnsworth CRNA      lactated ringers  125 mL/hr Intravenous Continuous María Caballero MD Stopped (01/05/22 2010)    lidocaine  1 patch Topical Daily María Caballero MD      ondansetron  4 mg Intravenous Q6H PRN María Caballero MD      oxyCODONE  10 mg Oral Q4H PRN María Caballero MD      oxyCODONE  5 mg Oral Q4H PRN María Caballero MD      pravastatin  40 mg Oral Daily With Arlyce Simmonds, MD      QUEtiapine  25 mg Oral HS María Caballero MD      senna-docusate sodium  1 tablet Oral HS María Caballero MD      tamsulosin  0 4 mg Oral Daily María Caballero MD          Today, Patient Was Seen By: Vinicius Hansen PA-C    **Please Note: This note may have been constructed using a voice recognition system  **

## 2022-01-06 NOTE — ASSESSMENT & PLAN NOTE
At baseline patient is nonverbal with agitation at night  · Continue home meds  · Continue supportive care  · Geriatrics following

## 2022-01-06 NOTE — ASSESSMENT & PLAN NOTE
Previous history of right hip fracture status post ORIF on 11/5/2021 and now with left femoral fracture as above   · Fall precautions, safe ambulation  · PT/OT, needs rehab

## 2022-01-06 NOTE — PLAN OF CARE
Problem: PAIN - ADULT  Goal: Verbalizes/displays adequate comfort level or baseline comfort level  Description: Interventions:  - Encourage patient to monitor pain and request assistance  - Assess pain using appropriate pain scale  - Administer analgesics based on type and severity of pain and evaluate response  - Implement non-pharmacological measures as appropriate and evaluate response  - Consider cultural and social influences on pain and pain management  - Notify physician/advanced practitioner if interventions unsuccessful or patient reports new pain  Outcome: Progressing     Problem: INFECTION - ADULT  Goal: Absence or prevention of progression during hospitalization  Description: INTERVENTIONS:  - Assess and monitor for signs and symptoms of infection  - Monitor lab/diagnostic results  - Monitor all insertion sites, i e  indwelling lines, tubes, and drains  - Monitor endotracheal if appropriate and nasal secretions for changes in amount and color  - [place] appropriate cooling/warming therapies per order  - Administer medications as ordered  - Instruct and encourage patient and family to use good hand hygiene technique  - Identify and instruct in appropriate isolation precautions for identified infection/condition  Outcome: Progressing  Goal: Absence of fever/infection during neutropenic period  Description: INTERVENTIONS:  - Monitor WBC    Outcome: Progressing

## 2022-01-06 NOTE — PROGRESS NOTES
Patient remains hospitalized at time of this chart review, LOS=4d  Patient underwent ORIF of left hip 1/4/22  IP CM notes indicate referral placed to [facility] as per request of patient's wife  This CM will continue to monitor via chart review throughout hospitalization

## 2022-01-06 NOTE — CASE MANAGEMENT
Case Management Discharge Planning Note    Patient name Santo Mortensen  [facility] [id number]  : 1945 Date 2022       Current Admission Date: 2022  Current Admission Diagnosis:Closed fracture of neck of left femur [facility])   Patient Active Problem List    Diagnosis Date Noted    Acute pain due to trauma 2022    At high risk for caregiver role strain 2022    Frailty syndrome in geriatric patient 2022    Impaired vision 2022    High risk of developing delirium  2022    Closed fracture of neck of left femur ([facility] [address] ) 2022    Ambulatory dysfunction 2022    Moderate protein-calorie malnutrition ([facility] [address] ) 2021    Sacral decubitus ulcer, stage II ([facility] [address] ) 2021    Encephalopathy 2021    Fall 2021    Closed right hip fracture ([facility] [address] ) 2021    Dysphagia 2021    S/P total knee replacement, right 2021    Anemia 2021    Alzheimer's disease ([facility] [address] ) 2020    Memory impairment 2020    Hypercholesterolemia 2020    Benign prostatic hyperplasia without lower urinary tract symptoms 2020    Essential hypertension 2020    Elevated fasting glucose 2020      LOS (days): 4  Geometric Mean LOS (GMLOS) (days): 4 30  Days to GMLOS:0 6     OBJECTIVE:  Risk of Unplanned Readmission Score: 23         Current admission status: Inpatient   Preferred Pharmacy:   [id number] [facility] [address]  Phone: [phone] Fax: [phone]    Primary Care Provider: HORACIO Hodgson    Primary Insurance: MEDICARE  Secondary Insurance: [facility] DETAILS:    Discharge planning discussed with[de-identified] WifeIrene [place] of Choice: Yes  Comments - Freedom of Choice: discussed FOC  CM contacted family/caregiver?: Yes  Were Treatment Team discharge recommendations reviewed with patient/caregiver?: Yes  Did patient/caregiver verbalize understanding of patient care needs?: Yes  Were patient/caregiver advised of the risks associated with not following Treatment Team discharge recommendations?: Yes    Contacts  Patient Contacts: Mary Jo Mirza  Contact Method: Phone  Phone Number: [phone]  Reason/Outcome: Continuity of [address]          Is the patient interested in [place] [address] at discharge?: No    DME Referral Provided  Referral made for DME?: No    Other Referral/Resources/Interventions Provided:  Referral Comments: Wife requesting referral to [facility], referral entered in [facility]

## 2022-01-06 NOTE — PROGRESS NOTES
Progress Note - Orthopedics   Ej Davis 67 y o  male MRN: [id number]  [facility]      Subjective:    No acute events overnight  No fevers or chills       Labs:  0   Lab Value Date/Time    HCT 27 5 (L) 01/06/2022 0441    HCT 30 4 (L) 01/05/2022 0440    HCT 34 1 (L) 01/04/2022 0443    HGB 8 7 (L) 01/06/2022 0441    HGB 9 3 (L) 01/05/2022 0440    HGB 10 5 (L) 01/04/2022 0443    INR 0 98 01/02/2022 1808    WBC 5 25 01/06/2022 0441    WBC 5 75 01/05/2022 0440    WBC 6 14 01/04/2022 0443       Meds:    Current Facility-Administered Medications:     acetaminophen (TYLENOL) tablet 975 mg, 975 mg, Oral, Q8H [address], Jordon Connolly MD, 975 mg at 01/06/22 0520    busPIRone (BUSPAR) tablet 5 mg, 5 mg, Oral, BID, Jordon Connolly MD, 5 mg at 01/05/22 2213    donepezil (ARICEPT) tablet 10 mg, 10 mg, Oral, Daily, Jordon Connolly MD, 10 mg at 01/05/22 0815    enoxaparin (LOVENOX) subcutaneous injection 40 mg, 40 mg, Subcutaneous, Daily, Jordon Connolly MD, 40 mg at 01/05/22 0815    escitalopram (LEXAPRO) tablet 10 mg, 10 mg, Oral, Daily, Jordon Connolly MD, 10 mg at 01/05/22 0815    fluticasone (FLONASE) 50 mcg/act nasal spray 1 spray, 1 spray, Nasal, Daily, Jordon Connolly MD, 1 spray at 01/05/22 0816    HYDROmorphone HCl (DILAUDID) injection 0 2 mg, 0 2 mg, Intravenous, Q2H PRN, Jordon Connolly MD    lactated ringers infusion, 75 mL/hr, Intravenous, Continuous, Estevan Ackerman CRNA    lactated ringers infusion, 125 mL/hr, Intravenous, Continuous, Jordon Connolly MD, Stopped at 01/05/22 2010    lidocaine (LIDODERM) 5 % patch 1 patch, 1 patch, Topical, Daily, Jordon Connolly MD, 1 patch at 01/05/22 0816    ondansetron (ZOFRAN) injection 4 mg, 4 mg, Intravenous, Q6H PRN, Jordon Connolly MD    oxyCODONE (ROXICODONE) immediate release tablet 10 mg, 10 mg, Oral, Q4H PRN, Jordon Connolly MD    oxyCODONE (ROXICODONE) IR tablet 5 mg, 5 mg, Oral, Q4H PRN, Jordon Connolly MD, 5 mg at 01/05/22 0927    pravastatin (PRAVACHOL) tablet 40 mg, 40 mg, Oral, Daily With Ramon Garcia MD, 40 mg at 01/05/22 1720    QUEtiapine (SEROquel) tablet 25 mg, 25 mg, Oral, HS, Jam Martini MD, 25 mg at 01/05/22 2213    senna-docusate sodium (SENOKOT S) 8 6-50 mg per tablet 1 tablet, 1 tablet, Oral, HS, Polk MD Lianet, 1 tablet at 01/05/22 2214    tamsulosin (FLOMAX) capsule 0 4 mg, 0 4 mg, Oral, Daily, Jam Martini MD, 0 4 mg at 01/05/22 0815    Blood Culture:   No results found for: BLOODCX    Wound Culture:   No results found for: WOUNDCULT    Ins and Outs:  I/O last 24 hours: In: 3999 6 [P O :1160; I V :2739 6; IV Piggyback:100]  Out: 550 [Urine:550]          Physical:  Vitals:    01/06/22 0224   BP: 119/59   Pulse: 71   Resp: 18   Temp: 97 6 °F (36 4 °C)   SpO2: 99%     Musculoskeletal: left Lower Extremity  · Skin intact   · TTP left hip as expected   · Unable to assess motor and sensory secondary to AMS     Assessment:    67 y  o male POD 2 left hip cannulated screw fixation        Plan:  · WBAT LLE   · PT/OT   · Pain control  · DVT ppx   · Dispo: Ortho will follow    Jojo Wu MD

## 2022-01-07 LAB
ANION GAP SERPL CALCULATED.3IONS-SCNC: 7 MMOL/L (ref 4–13)
BASOPHILS # BLD AUTO: 0.03 THOUSANDS/ΜL (ref 0–0.1)
BASOPHILS NFR BLD AUTO: 1 % (ref 0–1)
BUN SERPL-MCNC: 15 MG/DL (ref 5–25)
CALCIUM SERPL-MCNC: 8.8 MG/DL (ref 8.3–10.1)
CHLORIDE SERPL-SCNC: 108 MMOL/L (ref 100–108)
CO2 SERPL-SCNC: 22 MMOL/L (ref 21–32)
CREAT SERPL-MCNC: 0.42 MG/DL (ref 0.6–1.3)
EOSINOPHIL # BLD AUTO: 0.22 THOUSAND/ΜL (ref 0–0.61)
EOSINOPHIL NFR BLD AUTO: 4 % (ref 0–6)
ERYTHROCYTE [DISTWIDTH] IN BLOOD BY AUTOMATED COUNT: 13.7 % (ref 11.6–15.1)
FLUAV RNA RESP QL NAA+PROBE: NEGATIVE
FLUBV RNA RESP QL NAA+PROBE: NEGATIVE
GFR SERPL CREATININE-BSD FRML MDRD: 112 ML/MIN/1.73SQ M
GLUCOSE SERPL-MCNC: 88 MG/DL (ref 65–140)
HCT VFR BLD AUTO: 29.3 % (ref 36.5–49.3)
HGB BLD-MCNC: 9.4 G/DL (ref 12–17)
IMM GRANULOCYTES # BLD AUTO: 0.02 THOUSAND/UL (ref 0–0.2)
IMM GRANULOCYTES NFR BLD AUTO: 0 % (ref 0–2)
LYMPHOCYTES # BLD AUTO: 1.34 THOUSANDS/ΜL (ref 0.6–4.47)
LYMPHOCYTES NFR BLD AUTO: 22 % (ref 14–44)
MCH RBC QN AUTO: 28.2 PG (ref 26.8–34.3)
MCHC RBC AUTO-ENTMCNC: 32.1 G/DL (ref 31.4–37.4)
MCV RBC AUTO: 88 FL (ref 82–98)
MONOCYTES # BLD AUTO: 0.42 THOUSAND/ΜL (ref 0.17–1.22)
MONOCYTES NFR BLD AUTO: 7 % (ref 4–12)
NEUTROPHILS # BLD AUTO: 4.01 THOUSANDS/ΜL (ref 1.85–7.62)
NEUTS SEG NFR BLD AUTO: 66 % (ref 43–75)
NRBC BLD AUTO-RTO: 0 /100 WBCS
PLATELET # BLD AUTO: 236 THOUSANDS/UL (ref 149–390)
PMV BLD AUTO: 10.2 FL (ref 8.9–12.7)
POTASSIUM SERPL-SCNC: 3.6 MMOL/L (ref 3.5–5.3)
RBC # BLD AUTO: 3.33 MILLION/UL (ref 3.88–5.62)
SARS-COV-2 RNA RESP QL NAA+PROBE: NEGATIVE
SODIUM SERPL-SCNC: 137 MMOL/L (ref 136–145)
WBC # BLD AUTO: 6.04 THOUSAND/UL (ref 4.31–10.16)

## 2022-01-07 PROCEDURE — 85025 COMPLETE CBC W/AUTO DIFF WBC: CPT | Performed by: STUDENT IN AN ORGANIZED HEALTH CARE EDUCATION/TRAINING PROGRAM

## 2022-01-07 PROCEDURE — 99232 SBSQ HOSP IP/OBS MODERATE 35: CPT | Performed by: INTERNAL MEDICINE

## 2022-01-07 PROCEDURE — 80048 BASIC METABOLIC PNL TOTAL CA: CPT | Performed by: STUDENT IN AN ORGANIZED HEALTH CARE EDUCATION/TRAINING PROGRAM

## 2022-01-07 PROCEDURE — 97535 SELF CARE MNGMENT TRAINING: CPT

## 2022-01-07 RX ADMIN — ESCITALOPRAM OXALATE 10 MG: 10 TABLET ORAL at 08:18

## 2022-01-07 RX ADMIN — FLUTICASONE PROPIONATE 1 SPRAY: 50 SPRAY, METERED NASAL at 08:18

## 2022-01-07 RX ADMIN — SENNOSIDES AND DOCUSATE SODIUM 1 TABLET: 50; 8.6 TABLET ORAL at 21:26

## 2022-01-07 RX ADMIN — OXYCODONE HYDROCHLORIDE 5 MG: 5 TABLET ORAL at 17:20

## 2022-01-07 RX ADMIN — ACETAMINOPHEN 975 MG: 325 TABLET, FILM COATED ORAL at 13:03

## 2022-01-07 RX ADMIN — LIDOCAINE 5% 1 PATCH: 700 PATCH TOPICAL at 08:18

## 2022-01-07 RX ADMIN — ENOXAPARIN SODIUM 40 MG: 40 INJECTION SUBCUTANEOUS at 08:18

## 2022-01-07 RX ADMIN — DONEPEZIL HYDROCHLORIDE 10 MG: 10 TABLET ORAL at 08:18

## 2022-01-07 RX ADMIN — ACETAMINOPHEN 975 MG: 325 TABLET, FILM COATED ORAL at 05:23

## 2022-01-07 RX ADMIN — BUSPIRONE HYDROCHLORIDE 5 MG: 5 TABLET ORAL at 21:26

## 2022-01-07 RX ADMIN — BUSPIRONE HYDROCHLORIDE 5 MG: 5 TABLET ORAL at 08:18

## 2022-01-07 RX ADMIN — OXYCODONE HYDROCHLORIDE 5 MG: 5 TABLET ORAL at 05:23

## 2022-01-07 RX ADMIN — ACETAMINOPHEN 975 MG: 325 TABLET, FILM COATED ORAL at 21:26

## 2022-01-07 RX ADMIN — QUETIAPINE FUMARATE 25 MG: 25 TABLET ORAL at 21:27

## 2022-01-07 RX ADMIN — TAMSULOSIN HYDROCHLORIDE 0.4 MG: 0.4 CAPSULE ORAL at 08:18

## 2022-01-07 RX ADMIN — PRAVASTATIN SODIUM 40 MG: 40 TABLET ORAL at 17:19

## 2022-01-07 NOTE — PROGRESS NOTES
[address] [facility]  Progress Note - Corbin Gian 1945, 67 y o  male MRN: [id number]  Unit/Bed#: [facility] 602-01 Encounter: [id number]  Primary Care Provider: HORACIO Mcneil   Date and time admitted to hospital: 1/2/2022  2:07 PM    * Closed fracture of neck of left femur [facility])  Assessment & Plan  Secondary to mechanical fall   · Ortho following  · S/p left hip cannulated screw fixation on 1/4  · Continue pain control with scheduled Tylenol, Lidoderm patch, prn low dose oxycodone   · Continue with DVT ppx   · PT/OT recommending rehab, not appropriate for acute level, SNF recommending  · CM following     Anemia  Assessment & Plan  Relatively chronic with baseline hemoglobin approximately 9-11  · Monitor for postoperative acute blood loss, Hgb stable this morning   · Trend CBC and transfuse as needed     Alzheimer's disease ([facility] [address] )  Assessment & Plan  At baseline patient is nonverbal with agitation at night  · Continue home meds  · Continue supportive care  · Geriatrics following     Ambulatory dysfunction  Assessment & Plan  Previous history of right hip fracture status post ORIF on 11/5/2021 and now with left femoral fracture as above   · Fall precautions, safe ambulation  · PT/OT, needs rehab     Sacral decubitus ulcer, stage II ([facility] [address] )  Assessment & Plan  · Continue local wound care        VTE Pharmacologic Prophylaxis: VTE Score: 10 Moderate Risk (Score 3-4) - Pharmacological DVT Prophylaxis Ordered: enoxaparin (Lovenox)  Patient Centered Rounds: I performed bedside rounds with nursing staff today  Discussions with Specialists or Other Care Team Provider: discussed with case management  Education and Discussions with Family / Patient: Updated  (wife) via phone  Time Spent for Care: 30 minutes  More than 50% of total time spent on counseling and coordination of care as described above      Current Length of Stay: 5 day(s)  Current Patient Status: Inpatient Certification Statement: The patient will continue to require additional inpatient hospital stay due to placement in SNF for rehab   Discharge Plan: dc whenever rehab bed available     Code Status: Level 3 - DNAR and DNI    Subjective:   Does not provide history  No distress  Appears comfortable  Objective:     Vitals:   Temp (24hrs), Av 7 °F (36 5 °C), Min:96 8 °F (36 °C), Max:98 5 °F (36 9 °C)    Temp:  [96 8 °F (36 °C)-98 5 °F (36 9 °C)] 96 8 °F (36 °C)  HR:  [59-86] 86  Resp:  [16-20] 18  BP: (118-149)/() 118/66  SpO2:  [83 %-100 %] 83 %  There is no height or weight on file to calculate BMI  Input and Output Summary (last 24 hours): Intake/Output Summary (Last 24 hours) at 2022 0752  Last data filed at 2022 0501  Gross per 24 hour   Intake 850 ml   Output --   Net 850 ml       Physical Exam:   Physical Exam  Vitals and nursing note reviewed  Constitutional:       General: He is not in acute distress  Comments: On RA    Cardiovascular:      Rate and Rhythm: Normal rate and regular rhythm  Abdominal:      General: There is no distension  Tenderness: There is no abdominal tenderness  Musculoskeletal:      Right lower leg: No edema  Left lower leg: No edema  Neurological:      Comments: Minimally verbal, does not follow commands  No distress   A&O x0           Additional Data:     Labs:  Results from last 7 days   Lab Units 22  0515   WBC Thousand/uL 6 04   HEMOGLOBIN g/dL 9 4*   HEMATOCRIT % 29 3*   PLATELETS Thousands/uL 236   NEUTROS PCT % 66   LYMPHS PCT % 22   MONOS PCT % 7   EOS PCT % 4     Results from last 7 days   Lab Units 22  0515   SODIUM mmol/L 137   POTASSIUM mmol/L 3 6   CHLORIDE mmol/L 108   CO2 mmol/L 22   BUN mg/dL 15   CREATININE mg/dL 0 42*   ANION GAP mmol/L 7   CALCIUM mg/dL 8 8   GLUCOSE RANDOM mg/dL 88     Results from last 7 days   Lab Units 22  1808   INR  0 98     Results from last 7 days   Lab Units 22  1615 POC GLUCOSE mg/dl 85               Lines/Drains:  Invasive Devices  Report    Peripheral Intravenous Line            Peripheral IV 01/04/22 Left Forearm 2 days                      Imaging: No pertinent imaging reviewed  Recent Cultures (last 7 days):         Last 24 Hours Medication List:   Current Facility-Administered Medications   Medication Dose Route Frequency Provider Last Rate    acetaminophen  975 mg Oral [facility] Sonal Alatorre MD      busPIRone  5 mg Oral BID Sonal Alatorre MD      donepezil  10 mg Oral Daily Sonal Alatorre MD      enoxaparin  40 mg Subcutaneous Daily Sonal Alatorre MD      escitalopram  10 mg Oral Daily Sonal Alatorre MD      fluticasone  1 spray Nasal Daily Sonal Alatorre MD      lactated ringers  75 mL/hr Intravenous Continuous Penny Humphreys CRNA      lactated ringers  125 mL/hr Intravenous Continuous Sonal Alatorre MD Stopped (01/05/22 2010)    lidocaine  1 patch Topical Daily Sonal Alatorre MD      ondansetron  4 mg Intravenous Q6H PRN Sonal Alatorre MD      oxyCODONE  10 mg Oral Q4H PRN Sonal Alatorre MD      oxyCODONE  5 mg Oral Q4H PRN Sonal Alatorre MD      pravastatin  40 mg Oral Daily With Sy Magallanes MD      QUEtiapine  25 mg Oral HS Sonal Alatorre MD      senna-docusate sodium  1 tablet Oral HS Sonal Alatorre MD      tamsulosin  0 4 mg Oral Daily Sonal Alatorre MD          Today, Patient Was Seen By: Pedro Mejia PA-C    **Please Note: This note may have been constructed using a voice recognition system  **

## 2022-01-07 NOTE — PLAN OF CARE
Problem: OCCUPATIONAL THERAPY ADULT  Goal: Performs self-care activities at highest level of function for planned discharge setting  See evaluation for individualized goals  Description: Treatment Interventions: ADL retraining,Functional transfer training,Endurance training,Cognitive reorientation,Patient/family training,Equipment evaluation/education,Compensatory technique education,Energy conservation,Activityengagement          See flowsheet documentation for full assessment, interventions and recommendations  Outcome: Progressing  Note: Limitation: Decreased ADL status,Decreased Safe judgement during ADL,Decreased cognition,Decreased endurance,Decreased high-level ADLs,Decreased self-care trans  Prognosis: Fair,Guarded  Assessment: pt participated in am ot session and was seen focusing on attmepts at grooming, drinking on own, direction following and spt  pt was alert but difficult to direct / initaite task requested  pt required max a x 2 for spt and for rolling r and l in bed  pt was cooperative for most portion of tasks  does best when items are placed into hands ie fidgets inorder to occupy hands  pt was ntoed to say " looks slippery" when positioned to look out windo at snow  pt requires max to ta for all ub and lb adls grooming and ta for toileting  Recommendation: Geriatric Consult (PT BEING FOLLOWED BY GERIATRICS )  OT Discharge Recommendation: Post acute rehabilitation services  OT - OK to Discharge:  Yes     April A Storm, HOLT

## 2022-01-07 NOTE — ASSESSMENT & PLAN NOTE
Relatively chronic with baseline hemoglobin approximately 9-11  · Monitor for postoperative acute blood loss, Hgb stable this morning   · Trend CBC and transfuse as needed

## 2022-01-07 NOTE — ASSESSMENT & PLAN NOTE
· Continue local wound care Patient requests all Lab and Radiology Results on their Discharge Instructions

## 2022-01-07 NOTE — OCCUPATIONAL THERAPY NOTE
Occupational Therapy Treatment Note:       01/07/22 1535   OT Last Visit   OT Visit Date 01/07/22   Note Type   Note Type Treatment   Restrictions/Precautions   LLE Weight Bearing Per Order WBAT   Other Precautions Cognitive; Chair Alarm; Bed Alarm;WBS;Fall Risk;Pain   Pain Assessment   Pain Assessment Tool FLACC   Pain Rating: FLACC (Rest) - Face 0   Pain Rating: FLACC (Rest) - Legs 0   Pain Rating: FLACC (Rest) - Activity 0   Pain Rating: FLACC (Rest) - Cry 0   Pain Rating: FLACC (Rest) - Consolability 0   Score: FLACC (Rest) 0   Pain Rating: FLACC (Activity) - Face 0   Pain Rating: FLACC (Activity) - Legs 0   Pain Rating: FLACC (Activity) - Activity 0   Pain Rating: FLACC (Activity) - Cry 0   Pain Rating: FLACC (Activity) - Consolability 0   Score: FLACC (Activity) 0   ADL   Eating Assistance 3  Moderate Assistance   Eating Comments asst to place cup / straw into pts l hand  pt was able to bring to mouth and drink with placement asst of straw  asst to terminate drinking for short rest   Grooming Assistance 1  Total Assistance   Grooming Comments despite domingo washing pts face he did not use towel to dry himself  Toileting Comments ta to max asst for all adls  pt able to place and hold arms for short periods of time   Bed Mobility   Supine to Sit 2  Maximal assistance   Transfers   Sit to Stand 2  Maximal assistance   Additional items Assist x 2   Stand to Sit 2  Maximal assistance   Additional items Assist x 2   Sit pivot   (pt perfored few side scoots on own, unable to follow task)   Additional Comments pt noted to ressit movements guidined by therapist  he was able to scoot own body x 3 on eob however when assisted was ntoed to go into trunk extension  pt is limited by cognition and immobility  pt with flexed up left leg/ bend knee  whicfh was difficult to relax   l le in more normal position after sitting in chair most of day   Cognition   Overall Cognitive Status Impaired   Arousal/Participation Alert Attention Difficulty attending to directions   Memory Unable to assess   Following Commands Follows one step commands inconsistently   Comments pt with much difficulty following verbal commands for adls  he was however able to drink from cup / straw c asst when placed into hand, hold and fiddle with plastic screw/bolt  pt also noted to hold and slide hands along blantes  and grabs hold of therpists fingers /wrists during mobility needing physical asst/ intervention  Assessment   Assessment pt participated in am ot session and was seen focusing on attmepts at grooming, drinking on own, direction following and spt  pt was alert but difficult to direct / initaite task requested  pt required max a x 2 for spt and for rolling r and l in bed  pt was cooperative for most portion of tasks  does best when items are placed into hands ie fidgets inorder to occupy hands  pt was ntoed to say " looks slippery" when positioned to look out windo at snow  pt requires max to ta for all ub and lb adls grooming and ta for toileting   Plan   Treatment Interventions ADL retraining;Functional transfer training; Endurance training;Cognitive reorientation;Patient/family training;Equipment evaluation/education; Activityengagement   Goal Expiration Date 01/19/22   OT Treatment Day 1   OT Frequency 2-3x/wk   Recommendation   OT Discharge Recommendation Post acute rehabilitation services   OT - OK to Discharge Yes   April A Storm, HOLT

## 2022-01-07 NOTE — PROGRESS NOTES
Progress Note - Geriatric Medicine   Joanna Esqueda 67 y o  male MRN: [id number]  Unit/Bed#: [facility] 602-01 Encounter: [id number]      Assessment/Plan:    Alzheimer's disease ([facility] [address] )  Assessment & Plan  -severe and progressive, currently dependent for all cares  -home regimen includes Aricept and Seroquel - if PRN for agitation is needed during hospitalization would consider low-dose Zyprexa  -patient frequently pulling at lines and sheets etc, to engager tactile senses and preoccupy hands patient was provided with dementia sensory nut and both toy which seems to have been helpful and patient is still utilizing today - nursing reports has been good distraction for patient  -maintain calm and quiet environment reduce risk overstimulation    At high risk for caregiver role strain  Assessment & Plan  -wife is primary caregiver at very high risk of caregiver burnout -considering long-term placement  -CM assisting with hospital dc planning, if bed at long term place of choice not available recommend SNF STR in the interim while family arranging long term placement plan  -continue psychosocial supports of patient and caregiver - caregiver resource packet compiled and will be provided to family on discharge    High risk of developing delirium   Assessment & Plan  -due to age, underlying dementia and comorbidities now s/p anesthesia in patient with history of acute metabolic encephalopathy during previous hospitalizations  -continue home donepezil and Seroquel regimen, if needed in short-term good consider low-dose Zyprexa 2 5mg Q8H PRN agitation unable to be redirected with conservative measures, please ensure pain and all other physiologic needs met and addressed before consideration of administration of medication for agitation as they are frequent precipitants of encephalopathy/agitation in patient sore unable to clearly communicate these needs due to underlying dementia/cognitive impairment    Impaired vision  Assessment & Plan  -requires use of corrective lenses, continue use at all appropriate times  -consider large font for printed materials provided to patient    Frailty syndrome in geriatric patient  Assessment & Plan  -clinical frailty scale stage 7/8, severely frail  -multifactorial including advanced dementia, age and multitude of chronic medical co-morbidities  -continue optimization of nutritional intake and chronic conditions as possible  -continue to ensure that treatments and interventions along with patient and family wishes and goals of care    Ambulatory dysfunction  Assessment & Plan  -reportedly mechanical fall at home 1/2/22  -head strike reported, unknown loss of conscious  -injuries as outlined below  -primarily wheelchair-bound at baseline and remains high risk recurrent falls in future due to deconditioning debility, poor safety awareness, impulsivity and underlying dementia  -continue fall precautions  -PT and OT following, appreciate input    * Closed fracture of neck of left femur ([facility] [address] )  Assessment & Plan  -s/p ORIF on 1/4/22  -continue neurovascular checks per protocol  -no acute blood loss anemia  -does not appear to be in acute pain at rest    Acute pain due to trauma  Assessment & Plan  -acute pain appears to have resolved, do not anticipate need for opiate medications on discharge    Care coordination:  Rounded with Josué Jones (RN)    Subjective:     Patient seen and examined at bedside where he is sitting resting comfortably watching television  He states "im good", nursing reports no acute events overnight  Review of Systems   Unable to perform ROS: Dementia     Objective:     Vitals: Blood pressure 118/66, pulse 86, temperature (!) 96 8 °F (36 °C), resp  rate 18, SpO2 (!) 83 %  ,There is no height or weight on file to calculate BMI        Intake/Output Summary (Last 24 hours) at 1/7/2022 1001  Last data filed at 1/7/2022 0900  Gross per 24 hour   Intake 990 ml   Output --   Net 990 ml     Current Medications: Reviewed    Physical Exam:   Physical Exam  Vitals and nursing note reviewed  Constitutional:       General: He is not in acute distress  Appearance: Normal appearance  Comments: Thin elderly male in no acute distress   HENT:      Head: Normocephalic  Comments: Wearing glasses     Nose: Nose normal       Mouth/Throat:      Mouth: Mucous membranes are moist       Comments: Dentition intact  Eyes:      General: No scleral icterus  Conjunctiva/sclera: Conjunctivae normal    Cardiovascular:      Rate and Rhythm: Normal rate and regular rhythm  Pulses: Normal pulses  Pulmonary:      Effort: Pulmonary effort is normal  No respiratory distress  Abdominal:      General: There is no distension  Palpations: Abdomen is soft  Tenderness: There is no abdominal tenderness  Musculoskeletal:      Cervical back: Neck supple  Right lower leg: No edema  Left lower leg: No edema  Comments: Moves all 4 extremities spontaneously     Skin:     General: Skin is warm and dry  Comments: Scattered ecchymosis bilateral upper extremities   Neurological:      Mental Status: He is alert  Comments: Awake and alert, pleasantly confused   Psychiatric:         Attention and Perception: He is inattentive  Cognition and Memory: Cognition is impaired  Judgment: Judgment is impulsive  Invasive Devices  Report    Peripheral Intravenous Line            Peripheral IV 01/04/22 Left Forearm 2 days              Lab, Imaging and other studies: I have personally reviewed pertinent reports

## 2022-01-08 PROCEDURE — 99232 SBSQ HOSP IP/OBS MODERATE 35: CPT | Performed by: INTERNAL MEDICINE

## 2022-01-08 RX ADMIN — ENOXAPARIN SODIUM 40 MG: 40 INJECTION SUBCUTANEOUS at 08:15

## 2022-01-08 RX ADMIN — DONEPEZIL HYDROCHLORIDE 10 MG: 10 TABLET ORAL at 08:14

## 2022-01-08 RX ADMIN — TAMSULOSIN HYDROCHLORIDE 0.4 MG: 0.4 CAPSULE ORAL at 08:14

## 2022-01-08 RX ADMIN — LIDOCAINE 5% 1 PATCH: 700 PATCH TOPICAL at 08:15

## 2022-01-08 RX ADMIN — SENNOSIDES AND DOCUSATE SODIUM 1 TABLET: 50; 8.6 TABLET ORAL at 22:07

## 2022-01-08 RX ADMIN — ACETAMINOPHEN 975 MG: 325 TABLET, FILM COATED ORAL at 22:05

## 2022-01-08 RX ADMIN — BUSPIRONE HYDROCHLORIDE 5 MG: 5 TABLET ORAL at 22:04

## 2022-01-08 RX ADMIN — ACETAMINOPHEN 975 MG: 325 TABLET, FILM COATED ORAL at 14:32

## 2022-01-08 RX ADMIN — FLUTICASONE PROPIONATE 1 SPRAY: 50 SPRAY, METERED NASAL at 08:16

## 2022-01-08 RX ADMIN — BUSPIRONE HYDROCHLORIDE 5 MG: 5 TABLET ORAL at 08:15

## 2022-01-08 RX ADMIN — QUETIAPINE FUMARATE 25 MG: 25 TABLET ORAL at 22:04

## 2022-01-08 RX ADMIN — ESCITALOPRAM OXALATE 10 MG: 10 TABLET ORAL at 08:15

## 2022-01-08 RX ADMIN — ACETAMINOPHEN 975 MG: 325 TABLET, FILM COATED ORAL at 06:52

## 2022-01-08 NOTE — PLAN OF CARE
Problem: PAIN - ADULT  Goal: Verbalizes/displays adequate comfort level or baseline comfort level  Description: Interventions:  - Encourage patient to monitor pain and request assistance  - Assess pain using appropriate pain scale  - Administer analgesics based on type and severity of pain and evaluate response  - Implement non-pharmacological measures as appropriate and evaluate response  - Consider cultural and social influences on pain and pain management  - Notify physician/advanced practitioner if interventions unsuccessful or patient reports new pain  1/8/2022 1046 by Evi Morel RN  Outcome: Progressing  1/8/2022 1042 by Evi Morel RN  Outcome: Progressing     Problem: INFECTION - ADULT  Goal: Absence or prevention of progression during hospitalization  Description: INTERVENTIONS:  - Assess and monitor for signs and symptoms of infection  - Monitor lab/diagnostic results  - Monitor all insertion sites, i e  indwelling lines, tubes, and drains  - Monitor endotracheal if appropriate and nasal secretions for changes in amount and color  - [place] appropriate cooling/warming therapies per order  - Administer medications as ordered  - Instruct and encourage patient and family to use good hand hygiene technique  - Identify and instruct in appropriate isolation precautions for identified infection/condition  1/8/2022 1046 by Evi Morel RN  Outcome: Progressing  1/8/2022 1042 by Evi Morel RN  Outcome: Progressing  Goal: Absence of fever/infection during neutropenic period  Description: INTERVENTIONS:  - Monitor WBC    1/8/2022 1046 by Evi Morel RN  Outcome: Progressing  1/8/2022 1042 by Evi Morel RN  Outcome: Progressing     Problem: SAFETY ADULT  Goal: Patient will remain free of falls  Description: INTERVENTIONS:  - Educate patient/family on patient safety including physical limitations  - Instruct patient to call for assistance with activity   - Consult OT/PT to assist with strengthening/mobility   - Keep Call bell within reach  - Keep bed low and locked with side rails adjusted as appropriate  - Keep care items and personal belongings within reach  - Initiate and maintain comfort rounds  - Make Fall Risk Sign visible to staff  - Offer Toileting every **2* Hours, in advance of need  - Initiate/Maintain **bed/chair *alarm  - Obtain necessary fall risk management equipment:   - Apply yellow socks and bracelet for high fall risk patients  - Consider moving patient to room near nurses station  1/8/2022 1046 by Zacarias Hayden RN  Outcome: Progressing  1/8/2022 1042 by Zacarias Hayden RN  Outcome: Progressing  Goal: Maintain or return to baseline ADL function  Description: INTERVENTIONS:  -  Assess patient's ability to carry out ADLs; assess patient's baseline for ADL function and identify physical deficits which impact ability to perform ADLs (bathing, care of mouth/teeth, toileting, grooming, dressing, etc )  - Assess/evaluate cause of self-care deficits   - Assess range of motion  - Assess patient's mobility; develop plan if impaired  - Assess patient's need for assistive devices and provide as appropriate  - Encourage maximum independence but intervene and supervise when necessary  - Involve family in performance of ADLs  - Assess for home care needs following discharge   - Consider OT consult to assist with ADL evaluation and planning for discharge  - Provide patient education as appropriate  1/8/2022 1046 by Zacarias Hayden RN  Outcome: Progressing  1/8/2022 1042 by Zacarias Hayden RN  Outcome: Progressing  Goal: Maintains/Returns to pre admission functional level  Description: INTERVENTIONS:  - Perform BMAT or MOVE assessment daily    - Set and communicate daily mobility goal to care team and patient/family/caregiver  - Collaborate with rehabilitation services on mobility goals if consulted  - Perform Range of Motion **3* times a day    - Reposition patient every **2* hours   - Dangle patient **3* times a day  - Stand patient *3** times a day  - Ambulate patient *3** times a day  - Out of bed to chair *3** times a day   - Out of bed for meals **3* times a day  - Out of bed for toileting  - Record patient progress and toleration of activity level   1/8/2022 1046 by Eliseo Britton RN  Outcome: Progressing  1/8/2022 1042 by Eliseo Britton RN  Outcome: Progressing     Problem: DISCHARGE PLANNING  Goal: Discharge to home or other facility with appropriate resources  Description: INTERVENTIONS:  - Identify barriers to discharge w/patient and caregiver  - Arrange for needed discharge resources and transportation as appropriate  - Identify discharge learning needs (meds, wound care, etc )  - Arrange for interpretive services to assist at discharge as needed  - Refer to Case Management Department for coordinating discharge planning if the patient needs post-hospital services based on physician/advanced practitioner order or complex needs related to functional status, cognitive ability, or social support system  1/8/2022 1046 by Eliseo Britton RN  Outcome: Progressing  1/8/2022 1042 by Eliseo Britton RN  Outcome: Progressing     Problem: Knowledge Deficit  Goal: Patient/family/caregiver demonstrates understanding of disease process, treatment plan, medications, and discharge instructions  Description: Complete learning assessment and assess knowledge base    Interventions:  - Provide teaching at level of understanding  - Provide teaching via preferred learning methods  1/8/2022 1046 by Eliseo Britton RN  Outcome: Progressing  1/8/2022 1042 by Eliseo Britton RN  Outcome: Progressing     Problem: Potential for Falls  Goal: Patient will remain free of falls  Description: INTERVENTIONS:  - Educate patient/family on patient safety including physical limitations  - Instruct patient to call for assistance with activity   - Consult OT/PT to assist with strengthening/mobility   - Keep Call bell within reach  - Keep bed low and locked with side rails adjusted as appropriate  - Keep care items and personal belongings within reach  - Initiate and maintain comfort rounds  - Make Fall Risk Sign visible to staff  - Offer Toileting every *2** Hours, in advance of need  - Initiate/Maintain *bed/chair **alarm  - Obtain necessary fall risk management equipment:  - Apply yellow socks and bracelet for high fall risk patients  - Consider moving patient to room near nurses station  1/8/2022 1046 by Lynn Zhang RN  Outcome: Progressing  1/8/2022 1042 by Lynn Zhang RN  Outcome: Progressing     Problem: Prexisting or High Potential for Compromised Skin Integrity  Goal: Skin integrity is maintained or improved  Description: INTERVENTIONS:  - Identify patients at risk for skin breakdown  - Assess and monitor skin integrity  - Assess and monitor nutrition and hydration status  - Monitor labs   - Assess for incontinence   - Turn and reposition patient  - Assist with mobility/ambulation  - Relieve pressure over bony prominences  - Avoid friction and shearing  - Provide appropriate hygiene as needed including keeping skin clean and dry  - Evaluate need for skin moisturizer/barrier cream  - Collaborate with interdisciplinary team   - Patient/family teaching  - Consider wound care consult   1/8/2022 1046 by Lynn Zhang RN  Outcome: Progressing  1/8/2022 1042 by Lynn Zhang RN  Outcome: Progressing     Problem: MOBILITY - ADULT  Goal: Maintain or return to baseline ADL function  Description: INTERVENTIONS:  -  Assess patient's ability to carry out ADLs; assess patient's baseline for ADL function and identify physical deficits which impact ability to perform ADLs (bathing, care of mouth/teeth, toileting, grooming, dressing, etc )  - Assess/evaluate cause of self-care deficits   - Assess range of motion  - Assess patient's mobility; develop plan if impaired  - Assess patient's need for assistive devices and provide as appropriate  - Encourage maximum independence but intervene and supervise when necessary  - Involve family in performance of ADLs  - Assess for home care needs following discharge   - Consider OT consult to assist with ADL evaluation and planning for discharge  - Provide patient education as appropriate  1/8/2022 1046 by Stanley Barker RN  Outcome: Progressing  1/8/2022 1042 by Stanley Barker RN  Outcome: Progressing  Goal: Maintains/Returns to pre admission functional level  Description: INTERVENTIONS:  - Perform BMAT or MOVE assessment daily    - Set and communicate daily mobility goal to care team and patient/family/caregiver  - Collaborate with rehabilitation services on mobility goals if consulted  - Perform Range of Motion **3* times a day  - Reposition patient every **2* hours    - Dangle patient *3** times a day  - Stand patient **3* times a day  - Ambulate patient *3** times a day  - Out of bed to chair *3** times a day   - Out of bed for meals *3** times a day  - Out of bed for toileting  - Record patient progress and toleration of activity level   1/8/2022 1046 by Stanley Barker RN  Outcome: Progressing  1/8/2022 1042 by Stanley Barker RN  Outcome: Progressing

## 2022-01-08 NOTE — ASSESSMENT & PLAN NOTE
At baseline patient is nonverbal with agitation at night  · Continue home meds  · Continue supportive care  · Geriatrics following Implemented All Universal Safety Interventions:  [place] to call system. Call bell, personal items and telephone within reach. Instruct patient to call for assistance. Room bathroom lighting operational. Non-slip footwear when patient is off stretcher. Physically safe environment: no spills, clutter or unnecessary equipment. Stretcher in lowest position, wheels locked, appropriate side rails in place.

## 2022-01-08 NOTE — PROGRESS NOTES
[facility]  Progress Note - Stewart Zee 1945, 67 y o  male MRN: [id number]  Unit/Bed#: [facility] 602-01 Encounter: [id number]  Primary Care Provider: HORACIO Arredondo   Date and time admitted to hospital: 1/2/2022  2:07 PM    * Closed fracture of neck of left femur [facility])  Assessment & Plan  Secondary to mechanical fall   · Ortho following  · S/p left hip cannulated screw fixation on 1/4  · Continue pain control with scheduled Tylenol, Lidoderm patch, prn low dose oxycodone   · Continue with DVT ppx   · PT/OT recommending rehab, not appropriate for acute level, SNF recommending  · CM following     Anemia  Assessment & Plan  Relatively chronic with baseline hemoglobin approximately 9-11  · Monitor for postoperative acute blood loss, Hgb stable as of last check  · Monitor CBC PRN and transfuse as needed     Alzheimer's disease ([facility] [address] )  Assessment & Plan  At baseline patient is nonverbal with agitation at night  · Continue home meds  · Continue supportive care  · Geriatrics following     Ambulatory dysfunction  Assessment & Plan  Previous history of right hip fracture status post ORIF on 11/5/2021 and now with left femoral fracture as above   · Fall precautions, safe ambulation  · PT/OT, needs rehab     Sacral decubitus ulcer, stage II ([facility] [address] )  Assessment & Plan  · Continue local wound care          VTE Pharmacologic Prophylaxis: VTE Score: 10 Moderate Risk (Score 3-4) - Pharmacological DVT Prophylaxis Ordered: enoxaparin (Lovenox)  Patient Centered Rounds: I performed bedside rounds with nursing staff today  Discussions with Specialists or Other Care Team Provider: D/W case management  Education and Discussions with Family / Patient: Updated  (wife) via phone  Time Spent for Care: 30 minutes  More than 50% of total time spent on counseling and coordination of care as described above      Current Length of Stay: 6 day(s)  Current Patient Status: Inpatient Certification Statement: The patient will continue to require additional inpatient hospital stay due to Medically stable pending rehab placement  Discharge Plan: Anticipate discharge as soon as rehab bed found, patient is medically stable    Code Status: Level 3 - DNAR and DNI    Subjective:   Patient unable to provide any significant history secondary to baseline dementia  No events per nursing  Playing with nut/bolt sensory toy  Objective:     Vitals:   Temp (24hrs), Av 8 °F (36 °C), Min:96 5 °F (35 8 °C), Max:97 °F (36 1 °C)    Temp:  [96 5 °F (35 8 °C)-97 °F (36 1 °C)] 97 °F (36 1 °C)  HR:  [61-93] 69  Resp:  [16-19] 16  BP: (131-141)/(64-85) 139/71  SpO2:  [85 %-98 %] 91 %  There is no height or weight on file to calculate BMI  Input and Output Summary (last 24 hours): Intake/Output Summary (Last 24 hours) at 2022 0755  Last data filed at 2022 0239  Gross per 24 hour   Intake 472 ml   Output 485 ml   Net -13 ml       Physical Exam:   Physical Exam  Vitals and nursing note reviewed  Constitutional:       General: He is awake  Cardiovascular:      Rate and Rhythm: Normal rate and regular rhythm  Pulmonary:      Effort: No respiratory distress  Abdominal:      General: There is no distension  Tenderness: There is no abdominal tenderness  Musculoskeletal:      Right lower leg: No edema  Left lower leg: No edema  Neurological:      Mental Status: He is alert        Comments: A&Ox0, minimally verbal (baseline)          Additional Data:     Labs:  Results from last 7 days   Lab Units 22  0515   WBC Thousand/uL 6 04   HEMOGLOBIN g/dL 9 4*   HEMATOCRIT % 29 3*   PLATELETS Thousands/uL 236   NEUTROS PCT % 66   LYMPHS PCT % 22   MONOS PCT % 7   EOS PCT % 4     Results from last 7 days   Lab Units 22  0515   SODIUM mmol/L 137   POTASSIUM mmol/L 3 6   CHLORIDE mmol/L 108   CO2 mmol/L 22   BUN mg/dL 15   CREATININE mg/dL 0 42*   ANION GAP mmol/L 7   CALCIUM mg/dL 8  8   GLUCOSE RANDOM mg/dL 88     Results from last 7 days   Lab Units 01/02/22  1808   INR  0 98     Results from last 7 days   Lab Units 01/04/22  1615   POC GLUCOSE mg/dl 85               Lines/Drains:  Invasive Devices  Report    Peripheral Intravenous Line            Peripheral IV 01/04/22 Left Forearm 3 days                      Imaging: No pertinent imaging reviewed  Recent Cultures (last 7 days):         Last 24 Hours Medication List:   Current Facility-Administered Medications   Medication Dose Route Frequency Provider Last Rate    acetaminophen  975 mg Oral [facility] Polina Sánchez MD      busPIRone  5 mg Oral BID Polina Sánchez MD      donepezil  10 mg Oral Daily Polina Sánchez MD      enoxaparin  40 mg Subcutaneous Daily Polina Sánchez MD      escitalopram  10 mg Oral Daily Polina Sánchez MD      fluticasone  1 spray Nasal Daily Polina Sánchez MD      lidocaine  1 patch Topical Daily Polina Sánchez MD      ondansetron  4 mg Intravenous Q6H PRN Polina Sánchez MD      oxyCODONE  10 mg Oral Q4H PRN Polina Sánchez MD      oxyCODONE  5 mg Oral Q4H PRN Polina Sánchez MD      pravastatin  40 mg Oral Daily With Luis Grimes MD      QUEtiapine  25 mg Oral HS Polina Sánchez MD      senna-docusate sodium  1 tablet Oral HS Polina Sánchez MD      tamsulosin  0 4 mg Oral Daily Polina Sánchez MD          Today, Patient Was Seen By: Mookie Marie PA-C    **Please Note: This note may have been constructed using a voice recognition system  **

## 2022-01-08 NOTE — ASSESSMENT & PLAN NOTE
Relatively chronic with baseline hemoglobin approximately 9-11  · Monitor for postoperative acute blood loss, Hgb stable as of last check  · Monitor CBC PRN and transfuse as needed

## 2022-01-08 NOTE — PLAN OF CARE
Problem: PAIN - ADULT  Goal: Verbalizes/displays adequate comfort level or baseline comfort level  Description: Interventions:  - Encourage patient to monitor pain and request assistance  - Assess pain using appropriate pain scale  - Administer analgesics based on type and severity of pain and evaluate response  - Implement non-pharmacological measures as appropriate and evaluate response  - Consider cultural and social influences on pain and pain management  - Notify physician/advanced practitioner if interventions unsuccessful or patient reports new pain  Outcome: Progressing     Problem: INFECTION - ADULT  Goal: Absence or prevention of progression during hospitalization  Description: INTERVENTIONS:  - Assess and monitor for signs and symptoms of infection  - Monitor lab/diagnostic results  - Monitor all insertion sites, i e  indwelling lines, tubes, and drains  - Monitor endotracheal if appropriate and nasal secretions for changes in amount and color  - [place] appropriate cooling/warming therapies per order  - Administer medications as ordered  - Instruct and encourage patient and family to use good hand hygiene technique  - Identify and instruct in appropriate isolation precautions for identified infection/condition  Outcome: Progressing  Goal: Absence of fever/infection during neutropenic period  Description: INTERVENTIONS:  - Monitor WBC    Outcome: Progressing     Problem: SAFETY ADULT  Goal: Patient will remain free of falls  Description: INTERVENTIONS:  - Educate patient/family on patient safety including physical limitations  - Instruct patient to call for assistance with activity   - Consult OT/PT to assist with strengthening/mobility   - Keep Call bell within reach  - Keep bed low and locked with side rails adjusted as appropriate  - Keep care items and personal belongings within reach  - Initiate and maintain comfort rounds  - Make Fall Risk Sign visible to staff  - Offer Toileting every **2* Hours, in advance of need  - Initiate/Maintain *bed/chair **alarm  - Obtain necessary fall risk management equipment:   - Apply yellow socks and bracelet for high fall risk patients  - Consider moving patient to room near nurses station  Outcome: Progressing  Goal: Maintain or return to baseline ADL function  Description: INTERVENTIONS:  -  Assess patient's ability to carry out ADLs; assess patient's baseline for ADL function and identify physical deficits which impact ability to perform ADLs (bathing, care of mouth/teeth, toileting, grooming, dressing, etc )  - Assess/evaluate cause of self-care deficits   - Assess range of motion  - Assess patient's mobility; develop plan if impaired  - Assess patient's need for assistive devices and provide as appropriate  - Encourage maximum independence but intervene and supervise when necessary  - Involve family in performance of ADLs  - Assess for home care needs following discharge   - Consider OT consult to assist with ADL evaluation and planning for discharge  - Provide patient education as appropriate  Outcome: Progressing  Goal: Maintains/Returns to pre admission functional level  Description: INTERVENTIONS:  - Perform BMAT or MOVE assessment daily    - Set and communicate daily mobility goal to care team and patient/family/caregiver  - Collaborate with rehabilitation services on mobility goals if consulted  - Perform Range of Motion *3** times a day  - Reposition patient every *2** hours    - Dangle patient **3* times a day  - Stand patient **3* times a day  - Ambulate patient **3* times a day  - Out of bed to chair **3* times a day   - Out of bed for meals **3* times a day  - Out of bed for toileting  - Record patient progress and toleration of activity level   Outcome: Progressing     Problem: DISCHARGE PLANNING  Goal: Discharge to home or other facility with appropriate resources  Description: INTERVENTIONS:  - Identify barriers to discharge w/patient and caregiver  - Arrange for needed discharge resources and transportation as appropriate  - Identify discharge learning needs (meds, wound care, etc )  - Arrange for interpretive services to assist at discharge as needed  - Refer to Case Management Department for coordinating discharge planning if the patient needs post-hospital services based on physician/advanced practitioner order or complex needs related to functional status, cognitive ability, or social support system  Outcome: Progressing     Problem: Knowledge Deficit  Goal: Patient/family/caregiver demonstrates understanding of disease process, treatment plan, medications, and discharge instructions  Description: Complete learning assessment and assess knowledge base    Interventions:  - Provide teaching at level of understanding  - Provide teaching via preferred learning methods  Outcome: Progressing     Problem: Potential for Falls  Goal: Patient will remain free of falls  Description: INTERVENTIONS:  - Educate patient/family on patient safety including physical limitations  - Instruct patient to call for assistance with activity   - Consult OT/PT to assist with strengthening/mobility   - Keep Call bell within reach  - Keep bed low and locked with side rails adjusted as appropriate  - Keep care items and personal belongings within reach  - Initiate and maintain comfort rounds  - Make Fall Risk Sign visible to staff  - Offer Toileting every **2* Hours, in advance of need  - Initiate/Maintain **bed/chair *alarm  - Obtain necessary fall risk management equipment:  - Apply yellow socks and bracelet for high fall risk patients  - Consider moving patient to room near nurses station  Outcome: Progressing     Problem: Prexisting or High Potential for Compromised Skin Integrity  Goal: Skin integrity is maintained or improved  Description: INTERVENTIONS:  - Identify patients at risk for skin breakdown  - Assess and monitor skin integrity  - Assess and monitor nutrition and hydration status  - Monitor labs   - Assess for incontinence   - Turn and reposition patient  - Assist with mobility/ambulation  - Relieve pressure over bony prominences  - Avoid friction and shearing  - Provide appropriate hygiene as needed including keeping skin clean and dry  - Evaluate need for skin moisturizer/barrier cream  - Collaborate with interdisciplinary team   - Patient/family teaching  - Consider wound care consult   Outcome: Progressing     Problem: MOBILITY - ADULT  Goal: Maintain or return to baseline ADL function  Description: INTERVENTIONS:  -  Assess patient's ability to carry out ADLs; assess patient's baseline for ADL function and identify physical deficits which impact ability to perform ADLs (bathing, care of mouth/teeth, toileting, grooming, dressing, etc )  - Assess/evaluate cause of self-care deficits   - Assess range of motion  - Assess patient's mobility; develop plan if impaired  - Assess patient's need for assistive devices and provide as appropriate  - Encourage maximum independence but intervene and supervise when necessary  - Involve family in performance of ADLs  - Assess for home care needs following discharge   - Consider OT consult to assist with ADL evaluation and planning for discharge  - Provide patient education as appropriate  Outcome: Progressing  Goal: Maintains/Returns to pre admission functional level  Description: INTERVENTIONS:  - Perform BMAT or MOVE assessment daily    - Set and communicate daily mobility goal to care team and patient/family/caregiver  - Collaborate with rehabilitation services on mobility goals if consulted  - Perform Range of Motion **3* times a day  - Reposition patient every **2* hours    - Dangle patient *3** times a day  - Stand patient *3** times a day  - Ambulate patient **3* times a day  - Out of bed to chair *3** times a day   - Out of bed for meals *3** times a day  - Out of bed for toileting  - Record patient progress and toleration of activity level   Outcome: Progressing

## 2022-01-09 LAB
ANION GAP SERPL CALCULATED.3IONS-SCNC: 8 MMOL/L (ref 4–13)
BASOPHILS # BLD AUTO: 0.05 THOUSANDS/ΜL (ref 0–0.1)
BASOPHILS NFR BLD AUTO: 1 % (ref 0–1)
BUN SERPL-MCNC: 17 MG/DL (ref 5–25)
CALCIUM SERPL-MCNC: 9.1 MG/DL (ref 8.3–10.1)
CHLORIDE SERPL-SCNC: 110 MMOL/L (ref 100–108)
CO2 SERPL-SCNC: 22 MMOL/L (ref 21–32)
CREAT SERPL-MCNC: 0.54 MG/DL (ref 0.6–1.3)
EOSINOPHIL # BLD AUTO: 0.17 THOUSAND/ΜL (ref 0–0.61)
EOSINOPHIL NFR BLD AUTO: 2 % (ref 0–6)
ERYTHROCYTE [DISTWIDTH] IN BLOOD BY AUTOMATED COUNT: 13.6 % (ref 11.6–15.1)
GFR SERPL CREATININE-BSD FRML MDRD: 101 ML/MIN/1.73SQ M
GLUCOSE SERPL-MCNC: 81 MG/DL (ref 65–140)
HCT VFR BLD AUTO: 31.4 % (ref 36.5–49.3)
HGB BLD-MCNC: 10 G/DL (ref 12–17)
IMM GRANULOCYTES # BLD AUTO: 0.03 THOUSAND/UL (ref 0–0.2)
IMM GRANULOCYTES NFR BLD AUTO: 0 % (ref 0–2)
LYMPHOCYTES # BLD AUTO: 1.49 THOUSANDS/ΜL (ref 0.6–4.47)
LYMPHOCYTES NFR BLD AUTO: 21 % (ref 14–44)
MCH RBC QN AUTO: 28.5 PG (ref 26.8–34.3)
MCHC RBC AUTO-ENTMCNC: 31.8 G/DL (ref 31.4–37.4)
MCV RBC AUTO: 90 FL (ref 82–98)
MONOCYTES # BLD AUTO: 0.53 THOUSAND/ΜL (ref 0.17–1.22)
MONOCYTES NFR BLD AUTO: 8 % (ref 4–12)
NEUTROPHILS # BLD AUTO: 4.79 THOUSANDS/ΜL (ref 1.85–7.62)
NEUTS SEG NFR BLD AUTO: 68 % (ref 43–75)
NRBC BLD AUTO-RTO: 0 /100 WBCS
PLATELET # BLD AUTO: 283 THOUSANDS/UL (ref 149–390)
PMV BLD AUTO: 10.2 FL (ref 8.9–12.7)
POTASSIUM SERPL-SCNC: 3.8 MMOL/L (ref 3.5–5.3)
RBC # BLD AUTO: 3.51 MILLION/UL (ref 3.88–5.62)
SODIUM SERPL-SCNC: 140 MMOL/L (ref 136–145)
WBC # BLD AUTO: 7.06 THOUSAND/UL (ref 4.31–10.16)

## 2022-01-09 PROCEDURE — 97112 NEUROMUSCULAR REEDUCATION: CPT

## 2022-01-09 PROCEDURE — 97530 THERAPEUTIC ACTIVITIES: CPT

## 2022-01-09 PROCEDURE — 97110 THERAPEUTIC EXERCISES: CPT

## 2022-01-09 PROCEDURE — 80048 BASIC METABOLIC PNL TOTAL CA: CPT | Performed by: INTERNAL MEDICINE

## 2022-01-09 PROCEDURE — 85025 COMPLETE CBC W/AUTO DIFF WBC: CPT | Performed by: INTERNAL MEDICINE

## 2022-01-09 PROCEDURE — 99232 SBSQ HOSP IP/OBS MODERATE 35: CPT | Performed by: INTERNAL MEDICINE

## 2022-01-09 RX ADMIN — TAMSULOSIN HYDROCHLORIDE 0.4 MG: 0.4 CAPSULE ORAL at 08:21

## 2022-01-09 RX ADMIN — PRAVASTATIN SODIUM 40 MG: 40 TABLET ORAL at 22:09

## 2022-01-09 RX ADMIN — ACETAMINOPHEN 975 MG: 325 TABLET, FILM COATED ORAL at 05:30

## 2022-01-09 RX ADMIN — ENOXAPARIN SODIUM 40 MG: 40 INJECTION SUBCUTANEOUS at 08:20

## 2022-01-09 RX ADMIN — BUSPIRONE HYDROCHLORIDE 5 MG: 5 TABLET ORAL at 08:21

## 2022-01-09 RX ADMIN — BUSPIRONE HYDROCHLORIDE 5 MG: 5 TABLET ORAL at 22:03

## 2022-01-09 RX ADMIN — ESCITALOPRAM OXALATE 10 MG: 10 TABLET ORAL at 08:21

## 2022-01-09 RX ADMIN — DONEPEZIL HYDROCHLORIDE 10 MG: 10 TABLET ORAL at 08:21

## 2022-01-09 RX ADMIN — LIDOCAINE 5% 1 PATCH: 700 PATCH TOPICAL at 08:20

## 2022-01-09 RX ADMIN — QUETIAPINE FUMARATE 25 MG: 25 TABLET ORAL at 22:03

## 2022-01-09 NOTE — PROGRESS NOTES
[facility]  Progress Note - Radha Barron 1945, 67 y o  male MRN: [id number]  Unit/Bed#: [facility] 602-01 Encounter: [id number]  Primary Care Provider: HORACIO Wu   Date and time admitted to hospital: 1/2/2022  2:07 PM    * Closed fracture of neck of left femur [facility])  Assessment & Plan  Secondary to mechanical fall   · Ortho following  · S/p left hip cannulated screw fixation on 1/4  · Continue pain control with scheduled Tylenol, Lidoderm patch, prn low dose oxycodone   · Continue with DVT ppx   · PT/OT recommending rehab, has been medically stable, CM following    Anemia  Assessment & Plan  Relatively chronic with baseline hemoglobin approximately 9-11  · Monitor for postoperative acute blood loss, Hgb stable as of last check  · Monitor CBC PRN and transfuse as needed     Alzheimer's disease ([facility] [address] )  Assessment & Plan  At baseline patient is minimally verbal with occasional agitation at night  · Continue home meds  · Continue supportive care  · Geriatrics following   · Has been doing well here    Ambulatory dysfunction  Assessment & Plan  Previous history of right hip fracture status post ORIF on 11/5/2021 and now with left femoral fracture as above   · Fall precautions, safe ambulation  · PT/OT, needs rehab     Sacral decubitus ulcer, stage II ([address] )  Assessment & Plan  · Continue local wound care          VTE Pharmacologic Prophylaxis: VTE Score: 10 Moderate Risk (Score 3-4) - Pharmacological DVT Prophylaxis Ordered: enoxaparin (Lovenox)  Patient Centered Rounds: I performed bedside rounds with nursing staff today  Discussions with Specialists or Other Care Team Provider: Case management    Education and Discussions with Family / Patient: i did not call wife today as there are no new medical updates, i asked CM to call her today with updates on rehab progress  she was given last medical update on 1/8  Time Spent for Care: 30 minutes   More than 50% of total time spent on counseling and coordination of care as described above  Current Length of Stay: 7 day(s)  Current Patient Status: Inpatient   Certification Statement: The patient will continue to require additional inpatient hospital stay due to placement   Discharge Plan: stable pending placement     Code Status: Level 3 - DNAR and DNI    Subjective:   Does not provide much history given dementia  No issues per RN  Gets a bit agitated when RN tries to change him  Objective:     Vitals:   Temp (24hrs), Av 6 °F (36 4 °C), Min:97 3 °F (36 3 °C), Max:98 2 °F (36 8 °C)    Temp:  [97 3 °F (36 3 °C)-98 2 °F (36 8 °C)] 98 2 °F (36 8 °C)  HR:  [60-87] 80  Resp:  [16-18] 18  BP: (123-153)/(63-97) 153/72  SpO2:  [86 %-99 %] 96 %  There is no height or weight on file to calculate BMI  Input and Output Summary (last 24 hours): Intake/Output Summary (Last 24 hours) at 2022 1125  Last data filed at 2022 2259  Gross per 24 hour   Intake 238 ml   Output 630 ml   Net -392 ml       Physical Exam:   Physical Exam  Vitals and nursing note reviewed  Constitutional:       General: He is not in acute distress  Cardiovascular:      Rate and Rhythm: Normal rate  Pulmonary:      Effort: No respiratory distress  Abdominal:      General: There is no distension  Tenderness: There is no abdominal tenderness  Musculoskeletal:      Comments: L hip incision c/d/i    Neurological:      Comments: minimally verbal, A&O x0   Psychiatric:      Comments:  Withdrawn           Additional Data:     Labs:  Results from last 7 days   Lab Units 22  0442   WBC Thousand/uL 7 06   HEMOGLOBIN g/dL 10 0*   HEMATOCRIT % 31 4*   PLATELETS Thousands/uL 283   NEUTROS PCT % 68   LYMPHS PCT % 21   MONOS PCT % 8   EOS PCT % 2     Results from last 7 days   Lab Units 22  0442   SODIUM mmol/L 140   POTASSIUM mmol/L 3 8   CHLORIDE mmol/L 110*   CO2 mmol/L 22   BUN mg/dL 17   CREATININE mg/dL 0 54*   ANION GAP mmol/L 8 CALCIUM mg/dL 9 1   GLUCOSE RANDOM mg/dL 81     Results from last 7 days   Lab Units 01/02/22  1808   INR  0 98     Results from last 7 days   Lab Units 01/04/22  1615   POC GLUCOSE mg/dl 85               Lines/Drains:  Invasive Devices  Report    None                       Imaging: No pertinent imaging reviewed  Recent Cultures (last 7 days):         Last 24 Hours Medication List:   Current Facility-Administered Medications   Medication Dose Route Frequency Provider Last Rate    acetaminophen  975 mg Oral Pending sale to [facility] Marybeth Martinez MD      busPIRone  5 mg Oral BID Marybeth Martinez MD      donepezil  10 mg Oral Daily Marybeth Martinez MD      enoxaparin  40 mg Subcutaneous Daily Marybeth Martinez MD      escitalopram  10 mg Oral Daily Marybeth Martinez MD      fluticasone  1 spray Nasal Daily Marybeth Martinez MD      lidocaine  1 patch Topical Daily Marybeth Martinez MD      ondansetron  4 mg Intravenous Q6H PRN Marybeth Martinez MD      oxyCODONE  10 mg Oral Q4H PRN Marybeth Martinez MD      oxyCODONE  5 mg Oral Q4H PRN Marybeth Martinez MD      pravastatin  40 mg Oral Daily With Kizzy Hutchison MD      QUEtiapine  25 mg Oral HS Marybeth Martinez MD      senna-docusate sodium  1 tablet Oral HS Marybeth Martinez MD      tamsulosin  0 4 mg Oral Daily Marybeth Martinez MD          Today, Patient Was Seen By: Bettye Esparza PA-C    **Please Note: This note may have been constructed using a voice recognition system  **

## 2022-01-09 NOTE — PLAN OF CARE
Problem: PAIN - ADULT  Goal: Verbalizes/displays adequate comfort level or baseline comfort level  Description: Interventions:  - Encourage patient to monitor pain and request assistance  - Assess pain using appropriate pain scale  - Administer analgesics based on type and severity of pain and evaluate response  - Implement non-pharmacological measures as appropriate and evaluate response  - Consider cultural and social influences on pain and pain management  - Notify physician/advanced practitioner if interventions unsuccessful or patient reports new pain  Outcome: Not Progressing     Problem: INFECTION - ADULT  Goal: Absence or prevention of progression during hospitalization  Description: INTERVENTIONS:  - Assess and monitor for signs and symptoms of infection  - Monitor lab/diagnostic results  - Monitor all insertion sites, i e  indwelling lines, tubes, and drains  - Monitor endotracheal if appropriate and nasal secretions for changes in amount and color  - Codorus appropriate cooling/warming therapies per order  - Administer medications as ordered  - Instruct and encourage patient and family to use good hand hygiene technique  - Identify and instruct in appropriate isolation precautions for identified infection/condition  Outcome: Not Progressing  Goal: Absence of fever/infection during neutropenic period  Description: INTERVENTIONS:  - Monitor WBC    Outcome: Not Progressing     Problem: SAFETY ADULT  Goal: Patient will remain free of falls  Description: INTERVENTIONS:  - Educate patient/family on patient safety including physical limitations  - Instruct patient to call for assistance with activity   - Consult OT/PT to assist with strengthening/mobility   - Keep Call bell within reach  - Keep bed low and locked with side rails adjusted as appropriate  - Keep care items and personal belongings within reach  - Initiate and maintain comfort rounds  - Make Fall Risk Sign visible to staff  - Offer Toileting every 2 Hours, in advance of need  - Initiate/Maintain alarm  - Obtain necessary fall risk management equipment:   - Apply yellow socks and bracelet for high fall risk patients  - Consider moving patient to room near nurses station  Outcome: Not Progressing  Goal: Maintain or return to baseline ADL function  Description: INTERVENTIONS:  -  Assess patient's ability to carry out ADLs; assess patient's baseline for ADL function and identify physical deficits which impact ability to perform ADLs (bathing, care of mouth/teeth, toileting, grooming, dressing, etc )  - Assess/evaluate cause of self-care deficits   - Assess range of motion  - Assess patient's mobility; develop plan if impaired  - Assess patient's need for assistive devices and provide as appropriate  - Encourage maximum independence but intervene and supervise when necessary  - Involve family in performance of ADLs  - Assess for home care needs following discharge   - Consider OT consult to assist with ADL evaluation and planning for discharge  - Provide patient education as appropriate  Outcome: Not Progressing  Goal: Maintains/Returns to pre admission functional level  Description: INTERVENTIONS:  - Perform BMAT or MOVE assessment daily    - Set and communicate daily mobility goal to care team and patient/family/caregiver  - Collaborate with rehabilitation services on mobility goals if consulted  - Perform Range of Motion 3 times a day  - Reposition patient every 2 hours    - Dangle patient 3 times a day  - Stand patient 3 times a day  - Ambulate patient 3 times a day  - Out of bed to chair 3 times a day   - Out of bed for meals 3 times a day  - Out of bed for toileting  - Record patient progress and toleration of activity level   Outcome: Not Progressing     Problem: DISCHARGE PLANNING  Goal: Discharge to home or other facility with appropriate resources  Description: INTERVENTIONS:  - Identify barriers to discharge w/patient and caregiver  - Arrange for needed discharge resources and transportation as appropriate  - Identify discharge learning needs (meds, wound care, etc )  - Arrange for interpretive services to assist at discharge as needed  - Refer to Case Management Department for coordinating discharge planning if the patient needs post-hospital services based on physician/advanced practitioner order or complex needs related to functional status, cognitive ability, or social support system  Outcome: Not Progressing     Problem: Knowledge Deficit  Goal: Patient/family/caregiver demonstrates understanding of disease process, treatment plan, medications, and discharge instructions  Description: Complete learning assessment and assess knowledge base    Interventions:  - Provide teaching at level of understanding  - Provide teaching via preferred learning methods  Outcome: Not Progressing     Problem: Potential for Falls  Goal: Patient will remain free of falls  Description: INTERVENTIONS:  - Educate patient/family on patient safety including physical limitations  - Instruct patient to call for assistance with activity   - Consult OT/PT to assist with strengthening/mobility   - Keep Call bell within reach  - Keep bed low and locked with side rails adjusted as appropriate  - Keep care items and personal belongings within reach  - Initiate and maintain comfort rounds  - Make Fall Risk Sign visible to staff  - Offer Toileting every 2 Hours, in advance of need  - Initiate/Maintain alarm  - Obtain necessary fall risk management equipment:   - Apply yellow socks and bracelet for high fall risk patients  - Consider moving patient to room near nurses station  Outcome: Not Progressing     Problem: Prexisting or High Potential for Compromised Skin Integrity  Goal: Skin integrity is maintained or improved  Description: INTERVENTIONS:  - Identify patients at risk for skin breakdown  - Assess and monitor skin integrity  - Assess and monitor nutrition and hydration status  - Monitor labs   - Assess for incontinence   - Turn and reposition patient  - Assist with mobility/ambulation  - Relieve pressure over bony prominences  - Avoid friction and shearing  - Provide appropriate hygiene as needed including keeping skin clean and dry  - Evaluate need for skin moisturizer/barrier cream  - Collaborate with interdisciplinary team   - Patient/family teaching  - Consider wound care consult   Outcome: Not Progressing     Problem: MOBILITY - ADULT  Goal: Maintain or return to baseline ADL function  Description: INTERVENTIONS:  -  Assess patient's ability to carry out ADLs; assess patient's baseline for ADL function and identify physical deficits which impact ability to perform ADLs (bathing, care of mouth/teeth, toileting, grooming, dressing, etc )  - Assess/evaluate cause of self-care deficits   - Assess range of motion  - Assess patient's mobility; develop plan if impaired  - Assess patient's need for assistive devices and provide as appropriate  - Encourage maximum independence but intervene and supervise when necessary  - Involve family in performance of ADLs  - Assess for home care needs following discharge   - Consider OT consult to assist with ADL evaluation and planning for discharge  - Provide patient education as appropriate  Outcome: Not Progressing  Goal: Maintains/Returns to pre admission functional level  Description: INTERVENTIONS:  - Perform BMAT or MOVE assessment daily    - Set and communicate daily mobility goal to care team and patient/family/caregiver  - Collaborate with rehabilitation services on mobility goals if consulted  - Perform Range of Motion  times a day  - Reposition patient every  hours    - Dangle patient  times a day  - Stand patient  times a day  - Ambulate patient  times a day  - Out of bed to chair  times a day   - Out of bed for meals  times a day  - Out of bed for toileting  - Record patient progress and toleration of activity level   Outcome: Not Progressing

## 2022-01-09 NOTE — ASSESSMENT & PLAN NOTE
Secondary to mechanical fall   · Ortho following  · S/p left hip cannulated screw fixation on 1/4  · Continue pain control with scheduled Tylenol, Lidoderm patch, prn low dose oxycodone   · Continue with DVT ppx   · PT/OT recommending rehab, has been medically stable, CM following

## 2022-01-09 NOTE — ASSESSMENT & PLAN NOTE
At baseline patient is minimally verbal with occasional agitation at night  · Continue home meds  · Continue supportive care  · Geriatrics following   · Has been doing well here

## 2022-01-09 NOTE — PHYSICAL THERAPY NOTE
Physical Therapy Progress Note     01/09/22 1000   PT Last Visit   PT Visit Date 01/09/22   Note Type   Note Type Treatment   Pain Assessment   Pain Assessment Tool FLACC   Pain Rating: FLACC (Rest) - Face 0   Pain Rating: FLACC (Rest) - Legs 0   Pain Rating: FLACC (Rest) - Activity 0   Pain Rating: FLACC (Rest) - Cry 0   Pain Rating: FLACC (Rest) - Consolability 0   Score: FLACC (Rest) 0   Pain Rating: FLACC (Activity) - Face 0   Pain Rating: FLACC (Activity) - Legs 0   Pain Rating: FLACC (Activity) - Activity 0   Pain Rating: FLACC (Activity) - Cry 0   Pain Rating: FLACC (Activity) - Consolability 0   Score: FLACC (Activity) 0   Restrictions/Precautions   LLE Weight Bearing Per Order WBAT   Other Precautions Cognitive; Chair Alarm; Bed Alarm; Fall Risk  (Alarm active post session )   Subjective   Subjective The pt  notes "yum" with Latvian toast    Bed Mobility   Rolling R 1  Dependent   Additional items Assist x 1; Increased time required;Verbal cues;LE management  (Pt  resistant to all mobility )   Rolling L 1  Dependent   Additional items Assist x 1; Increased time required;Verbal cues;LE management  (Pt  resistant to all mobility )   Supine to Sit 1  Dependent   Additional items Assist x 1; Increased time required;HOB elevated;Verbal cues;LE management; Other;Assist x 2  (Pt  resistant to all mobility )   Transfers   Other 1  Dependent   Additional items Assist x 1;Assist x 2; Increased time required;Verbal cues   Additional Comments One lateral scoot along the edge of the bed with bilateral knees blocked and modified hammock technique  Ambulation/Elevation   Gait pattern Not appropriate   Balance   Static Sitting Poor -   Dynamic Sitting Poor -   Activity Tolerance   Activity Tolerance Patient tolerated treatment well   Exercises   THR Supine;Bilateral;PROM;AAROM;10 reps   Assessment   Prognosis Guarded   Problem List Decreased strength;Decreased range of motion;Decreased endurance; Impaired balance;Decreased mobility; Decreased cognition; Impaired judgement;Decreased safety awareness;Pain   Assessment The pt  was resistant with all mobility today  With attempts to roll or sit up he would maximally resist in the opposite direction  Utilized the head of the bed all of the way up and then pivoted him to the edge of the bed where this behaviour continued  Pulled the patient laterally once with his knees blocked between therapist's knees and use of bed pad to control hips and shoulder to block his torso  The pt  continued to resist and he was returned to supine  The patient continued to resist maximally with rolling and repositioning in the bed  Ultimately the patient was placed in the reclined chair position in bed where he began to eat breakfast  Alarm active and all needs within reach post session  Barriers to Discharge Inaccessible home environment;Decreased caregiver support   Goals   Patient Goals None expressed  STG Expiration Date 01/15/22   PT Treatment Day 1   Plan   Treatment/Interventions Functional transfer training;LE strengthening/ROM; Therapeutic exercise; Endurance training;Patient/family training;Cognitive reorientation; Bed mobility   Progress No functional improvements   PT Frequency 2-3x/wk   Recommendation   PT Discharge Recommendation Post acute rehabilitation services   AM-PAC Basic Mobility Inpatient   Turning in Bed Without Bedrails 1   Lying on Back to Sitting on Edge of Flat Bed 1   Moving Bed to Chair 1   Standing Up From Chair 1   Walk in Room 1   Climb 3-5 Stairs 1   Basic Mobility Inpatient Raw Score 6   Turning Head Towards Sound 4   Follow Simple Instructions 1   Low Function Basic Mobility Raw Score 11   Low Function Basic Mobility Standardized Score 16 55   Highest Level Of Mobility   JH-HLM Goal 2: Bed activities/Dependent transfer   JH-HLM Highest Level of Mobility 3: Sit at edge of bed   JH-HLM Goal Achieved Yes     An AM-PAC Basic Mobility standardized score less than 40 78 suggests the patient may benefit from discharge to post-acute rehab services      Kisha Pan PTA

## 2022-01-09 NOTE — PLAN OF CARE
Problem: PHYSICAL THERAPY ADULT  Goal: Performs mobility at highest level of function for planned discharge setting  See evaluation for individualized goals  Description: Treatment/Interventions: Functional transfer training,LE strengthening/ROM,Therapeutic exercise,Endurance training,Patient/family training,Equipment eval/education,Bed mobility,Continued evaluation,Spoke to nursing,OT  Equipment Recommended:  (continue to assess )       See flowsheet documentation for full assessment, interventions and recommendations  Outcome: Not Progressing  Note: Prognosis: Guarded  Problem List: Decreased strength,Decreased range of motion,Decreased endurance,Impaired balance,Decreased mobility,Decreased cognition,Impaired judgement,Decreased safety awareness,Pain  Assessment: The pt  was resistant with all mobility today  With attempts to roll or sit up he would maximally resist in the opposite direction  Utilized the head of the bed all of the way up and then pivoted him to the edge of the bed where this behaviour continued  Pulled the patient laterally once with his knees blocked between therapist's knees and use of bed pad to control hips and shoulder to block his torso  The pt  continued to resist and he was returned to supine  The patient continued to resist maximally with rolling and repositioning in the bed  Ultimately the patient was placed in the reclined chair position in bed where he began to eat breakfast  Alarm active and all needs within reach post session  Barriers to Discharge: Inaccessible home environment,Decreased caregiver support        PT Discharge Recommendation: Post acute rehabilitation services          See flowsheet documentation for full assessment

## 2022-01-09 NOTE — CASE MANAGEMENT
Case Management Discharge Planning Note    Patient name Pippa Melton  Location [address] [place] [address] 602/[facility] [id number] MRN [id number]  : 1945 Date 2022       Current Admission Date: 2022  Current Admission Diagnosis:Closed fracture of neck of left femur [facility])   Patient Active Problem List    Diagnosis Date Noted    Frailty syndrome in geriatric patient 2022    Impaired vision 2022    High risk of developing delirium  2022    Closed fracture of neck of left femur ([address] ) 2022    Ambulatory dysfunction 2022    Moderate protein-calorie malnutrition ([facility] [address] ) 2021    Sacral decubitus ulcer, stage II ([facility] [address] ) 2021    Encephalopathy 2021    Fall 2021    Closed right hip fracture ([facility] [address] ) 2021    Dysphagia 2021    S/P total knee replacement, right 2021    Anemia 2021    Alzheimer's disease ([facility] [address] ) 2020    Memory impairment 2020    Hypercholesterolemia 2020    Benign prostatic hyperplasia without lower urinary tract symptoms 2020    Essential hypertension 2020    Elevated fasting glucose 2020      LOS (days): 7  Geometric Mean LOS (GMLOS) (days): 4 30  Days to GMLOS:-2 6     OBJECTIVE:  Risk of Unplanned Readmission Score: 23         Current admission status: Inpatient   Preferred Pharmacy:   [id number] Jorgensen [address] [place] [address]  Phone: [phone] Fax: [phone]    Primary Care Provider: Sapna Gamble    Primary Insurance: MEDICARE  Secondary Insurance: [address] [place] [address] Dr:    Other Referral/Resources/Interventions Provided:  Referral Comments: Per [facility] communication [facility] unable to accept patient at this time as "slower paced therapy is reccomended " SNF referral's in   spoke with patients spouse Gali Gonzalez in order to provide update on same   Spouse provided narrowed down choices for possible accepting facilities  Spouse requested patient to remain in the [place] area for STR placement excluding [facility] facilities at this time  Per [address] communication referrals remain under clinical review at this time  CM department will continue to follow for accepting facilities

## 2022-01-10 PROCEDURE — NC001 PR NO CHARGE: Performed by: ORTHOPAEDIC SURGERY

## 2022-01-10 PROCEDURE — 99232 SBSQ HOSP IP/OBS MODERATE 35: CPT | Performed by: PHYSICIAN ASSISTANT

## 2022-01-10 RX ORDER — OXYCODONE HYDROCHLORIDE 5 MG/1
5 TABLET ORAL EVERY 4 HOURS PRN
Status: DISCONTINUED | OUTPATIENT
Start: 2022-01-10 | End: 2022-01-15 | Stop reason: HOSPADM

## 2022-01-10 RX ORDER — OXYCODONE HYDROCHLORIDE 5 MG/1
2.5 TABLET ORAL EVERY 4 HOURS PRN
Status: DISCONTINUED | OUTPATIENT
Start: 2022-01-10 | End: 2022-01-15 | Stop reason: HOSPADM

## 2022-01-10 RX ADMIN — LIDOCAINE 5% 1 PATCH: 700 PATCH TOPICAL at 08:47

## 2022-01-10 RX ADMIN — QUETIAPINE FUMARATE 25 MG: 25 TABLET ORAL at 22:12

## 2022-01-10 RX ADMIN — ACETAMINOPHEN 975 MG: 325 TABLET, FILM COATED ORAL at 17:16

## 2022-01-10 RX ADMIN — TAMSULOSIN HYDROCHLORIDE 0.4 MG: 0.4 CAPSULE ORAL at 08:47

## 2022-01-10 RX ADMIN — PRAVASTATIN SODIUM 40 MG: 40 TABLET ORAL at 17:16

## 2022-01-10 RX ADMIN — ENOXAPARIN SODIUM 40 MG: 40 INJECTION SUBCUTANEOUS at 08:47

## 2022-01-10 RX ADMIN — ACETAMINOPHEN 975 MG: 325 TABLET, FILM COATED ORAL at 22:12

## 2022-01-10 RX ADMIN — BUSPIRONE HYDROCHLORIDE 5 MG: 5 TABLET ORAL at 22:12

## 2022-01-10 RX ADMIN — BUSPIRONE HYDROCHLORIDE 5 MG: 5 TABLET ORAL at 08:47

## 2022-01-10 RX ADMIN — DONEPEZIL HYDROCHLORIDE 10 MG: 10 TABLET ORAL at 08:47

## 2022-01-10 RX ADMIN — FLUTICASONE PROPIONATE 1 SPRAY: 50 SPRAY, METERED NASAL at 08:48

## 2022-01-10 RX ADMIN — ESCITALOPRAM OXALATE 10 MG: 10 TABLET ORAL at 08:47

## 2022-01-10 NOTE — CASE MANAGEMENT
Case Management Progress Note    Patient name Stewart Zee  Location [address] [place] [address] [facility] [id number]  : 1945 Date 1/10/2022       LOS (days): 8  Geometric Mean LOS (GMLOS) (days): 4 30  Days to GMLOS:-3 4        OBJECTIVE:        Current admission status: Inpatient  Preferred Pharmacy:   [id number] [place]  [facility]  [address] [place] [address]  Phone: [phone] Fax: [phone]    Primary Care Provider: HORACIO Arredondo    Primary Insurance: MEDICARE  Secondary Insurance: [employer]    PROGRESS NOTE: Financial application uploaded to facilities in [facility] is closed to outside admissions until at least

## 2022-01-10 NOTE — PROGRESS NOTES
Progress Note - Orthopedics   [place] 67 y o  male MRN: [id number]  Unit/Bed#: [facility] 602-01      Subjective:    Overnight patient was reportedly picking at his bandages, patient placed in mittens overnight      Labs:  0   Lab Value Date/Time    HCT 31 4 (L) 01/09/2022 0442    HCT 29 3 (L) 01/07/2022 0515    HCT 27 5 (L) 01/06/2022 0441    HGB 10 0 (L) 01/09/2022 0442    HGB 9 4 (L) 01/07/2022 0515    HGB 8 7 (L) 01/06/2022 0441    INR 0 98 01/02/2022 1808    WBC 7 06 01/09/2022 0442    WBC 6 04 01/07/2022 0515    WBC 5 25 01/06/2022 0441       Meds:    Current Facility-Administered Medications:     acetaminophen (TYLENOL) tablet 975 mg, 975 mg, Oral, Q8H [address], Miguel Cintron MD, 975 mg at 01/09/22 0530    busPIRone (BUSPAR) tablet 5 mg, 5 mg, Oral, BID, Miguel Cintron MD, 5 mg at 01/09/22 2203    donepezil (ARICEPT) tablet 10 mg, 10 mg, Oral, Daily, Miguel Cintron MD, 10 mg at 01/09/22 3166    enoxaparin (LOVENOX) subcutaneous injection 40 mg, 40 mg, Subcutaneous, Daily, Miguel Cintron MD, 40 mg at 01/09/22 0820    escitalopram (LEXAPRO) tablet 10 mg, 10 mg, Oral, Daily, Miguel Cintron MD, 10 mg at 01/09/22 0821    fluticasone (FLONASE) 50 mcg/act nasal spray 1 spray, 1 spray, Nasal, Daily, Miguel Cintron MD, 1 spray at 01/08/22 0816    lidocaine (LIDODERM) 5 % patch 1 patch, 1 patch, Topical, Daily, Miguel Cintron MD, 1 patch at 01/09/22 0820    ondansetron (ZOFRAN) injection 4 mg, 4 mg, Intravenous, Q6H PRN, Miguel Cintron MD    oxyCODONE (ROXICODONE) immediate release tablet 10 mg, 10 mg, Oral, Q4H PRN, Miguel Cintron MD    oxyCODONE (ROXICODONE) IR tablet 5 mg, 5 mg, Oral, Q4H PRN, Miguel Cintron MD, 5 mg at 01/07/22 1720    pravastatin (PRAVACHOL) tablet 40 mg, 40 mg, Oral, Daily With Liya Fields MD, 40 mg at 01/09/22 2209    QUEtiapine (SEROquel) tablet 25 mg, 25 mg, Oral, HS, Miguel Cintron MD, 25 mg at 01/09/22 2203    senna-docusate sodium (SENOKOT S) 8 6-50 mg per tablet 1 tablet, 1 tablet, Oral, HS, Mihai Rojo MD, 1 tablet at 01/08/22 3787    tamsulosin [facility]) capsule 0 4 mg, 0 4 mg, Oral, Daily, Mihai Rojo MD, 0 4 mg at 01/09/22 9060    Blood Culture:   No results found for: BLOODCX    Wound Culture:   No results found for: WOUNDCULT    Ins and Outs:  I/O last 24 hours: In: 585 [P O :585]  Out: 630 [Urine:630]          Physical:  Vitals:    01/10/22 0300   BP: 135/62   Pulse: 75   Resp:    Temp: (!) 97 3 °F (36 3 °C)   SpO2: 96%     Musculoskeletal: left Lower Extremity  · Skin incision well appearing, possibly one stable removed with patient manipulation of the wound, skin remains well approximated, no significant erythema, no drainage  · TTP left hip as expected   · Unable to assess motor and sensory secondary to AMS     Assessment:    67 y  o male POD 5 left hip cannulated screw fixation        Plan:  · WBAT LLE   · Will reinforce dressings today prior to discharge  · PT/OT   · Pain control  · DVT ppx   · Dispo: [id number] Elida Silva for discharge from [facility] MD Nav

## 2022-01-10 NOTE — PROGRESS NOTES
[address] [facility]  Progress Note Jojo Bunch 1945, 67 y o  male MRN: [id number]  Unit/Bed#: [facility] 602-01 Encounter: [id number]  Primary Care Provider: HORACIO Patel   Date and time admitted to hospital: 1/2/2022  2:07 PM    * Closed fracture of neck of left femur [facility])  Assessment & Plan  Secondary to mechanical fall   · Ortho following  · S/p left hip cannulated screw fixation on 1/4  · Continue pain control with scheduled Tylenol, Lidoderm patch, prn low dose oxycodone   · Continue with DVT ppx   · PT/OT recommending rehab, has been medically stable, CM following    Ambulatory dysfunction  Assessment & Plan  Previous history of right hip fracture status post ORIF on 11/5/2021 and now with left femoral fracture as above   · Fall precautions, safe ambulation  · PT/OT, needs rehab     Alzheimer's disease ([facility] [address] )  Assessment & Plan  At baseline patient is minimally verbal with occasional agitation at night  · Continue home meds  · Continue supportive care  · Geriatrics following   · Has been doing well here  · Was placed on mitts last night, but now discontinued - D/W nursing to avoid further restraints  Sacral decubitus ulcer, stage II ([facility] [address] )  Assessment & Plan  · Continue local wound care    Anemia  Assessment & Plan  Relatively chronic with baseline hemoglobin approximately 9-11  · Monitor for postoperative acute blood loss, Hgb stable as of last check  · Monitor CBC PRN and transfuse as needed           VTE Pharmacologic Prophylaxis: VTE Score: 10 High Risk (Score >/= 5) - Pharmacological DVT Prophylaxis Ordered: enoxaparin (Lovenox)  Sequential Compression Devices Ordered  Patient Centered Rounds: I performed bedside rounds with nursing staff today  Discussions with Specialists or Other Care Team Provider: case management    Education and Discussions with Family / Patient: Updated  (wife) at bedside  Time Spent for Care: 30 minutes   More than 50% of total time spent on counseling and coordination of care as described above  Current Length of Stay: 8 day(s)  Current Patient Status: Inpatient   Certification Statement: The patient will continue to require additional inpatient hospital stay due to rehab placement  Discharge Plan: Anticipate discharge tomorrow to rehab facility  Code Status: Level 3 - DNAR and DNI    Subjective:   Patient non-verbal  [place]  Nursing reports mitts were placed last night  Patient currently comfortable, so removed  Objective:     Vitals:   Temp (24hrs), Av 6 °F (36 4 °C), Min:97 2 °F (36 2 °C), Max:98 3 °F (36 8 °C)    Temp:  [97 2 °F (36 2 °C)-98 3 °F (36 8 °C)] 97 2 °F (36 2 °C)  HR:  [75-85] 75  Resp:  [16] 16  BP: (134-149)/(60-69) 134/60  SpO2:  [96 %-98 %] 96 %  There is no height or weight on file to calculate BMI  Input and Output Summary (last 24 hours): Intake/Output Summary (Last 24 hours) at 1/10/2022 1202  Last data filed at 2022 1300  Gross per 24 hour   Intake 225 ml   Output --   Net 225 ml       Physical Exam:   Physical Exam  Vitals and nursing note reviewed  Constitutional:       Appearance: He is well-developed  HENT:      Head: Normocephalic and atraumatic  Eyes:      Conjunctiva/sclera: Conjunctivae normal    Cardiovascular:      Rate and Rhythm: Normal rate and regular rhythm  Heart sounds: No murmur heard  Pulmonary:      Effort: Pulmonary effort is normal  No respiratory distress  Breath sounds: Normal breath sounds  Abdominal:      Palpations: Abdomen is soft  Tenderness: There is no abdominal tenderness  Musculoskeletal:      Cervical back: Neck supple  Skin:     General: Skin is warm and dry  Neurological:      Mental Status: He is alert  He is disoriented  Comments: Non verbal   Psychiatric:         Cognition and Memory: Cognition is impaired  Memory is impaired            Additional Data:     Labs:  Results from last 7 days   Lab Units 01/09/22  0442   WBC Thousand/uL 7 06   HEMOGLOBIN g/dL 10 0*   HEMATOCRIT % 31 4*   PLATELETS Thousands/uL 283   NEUTROS PCT % 68   LYMPHS PCT % 21   MONOS PCT % 8   EOS PCT % 2     Results from last 7 days   Lab Units 01/09/22  0442   SODIUM mmol/L 140   POTASSIUM mmol/L 3 8   CHLORIDE mmol/L 110*   CO2 mmol/L 22   BUN mg/dL 17   CREATININE mg/dL 0 54*   ANION GAP mmol/L 8   CALCIUM mg/dL 9 1   GLUCOSE RANDOM mg/dL 81         Results from last 7 days   Lab Units 01/04/22  1615   POC GLUCOSE mg/dl 85               Lines/Drains:  Invasive Devices  Report    None                       Imaging: No pertinent imaging reviewed  Recent Cultures (last 7 days):         Last 24 Hours Medication List:   Current Facility-Administered Medications   Medication Dose Route Frequency Provider Last Rate    acetaminophen  975 mg Oral [facility] Saskia Harrison MD      busPIRone  5 mg Oral BID Saskia Harrison MD      donepezil  10 mg Oral Daily Saskia Harrison MD      enoxaparin  40 mg Subcutaneous Daily Saskia Harrison MD      escitalopram  10 mg Oral Daily Saskia Harrison MD      fluticasone  1 spray Nasal Daily Saskia Harrison MD      lidocaine  1 patch Topical Daily Saskia Harrison MD      ondansetron  4 mg Intravenous Q6H PRN Saskia Harrison MD      oxyCODONE  2 5 mg Oral Q4H PRN Quinn Rendon PA-C      oxyCODONE  5 mg Oral Q4H PRN Quinn Rendon PA-C      pravastatin  40 mg Oral Daily With Kayla Hanna MD      QUEtiapine  25 mg Oral HS Saskia Harrison MD      senna-docusate sodium  1 tablet Oral HS Saskia Harrison MD      tamsulosin  0 4 mg Oral Daily Saskia Harrison MD          Today, Patient Was Seen By: Quinn Rendon PA-C    **Please Note: This note may have been constructed using a voice recognition system  **

## 2022-01-10 NOTE — PLAN OF CARE
Problem: PAIN - ADULT  Goal: Verbalizes/displays adequate comfort level or baseline comfort level  Description: Interventions:  - Encourage patient to monitor pain and request assistance  - Assess pain using appropriate pain scale  - Administer analgesics based on type and severity of pain and evaluate response  - Implement non-pharmacological measures as appropriate and evaluate response  - Consider cultural and social influences on pain and pain management  - Notify physician/advanced practitioner if interventions unsuccessful or patient reports new pain  Outcome: Progressing     Problem: INFECTION - ADULT  Goal: Absence or prevention of progression during hospitalization  Description: INTERVENTIONS:  - Assess and monitor for signs and symptoms of infection  - Monitor lab/diagnostic results  - Monitor all insertion sites, i e  indwelling lines, tubes, and drains  - Monitor endotracheal if appropriate and nasal secretions for changes in amount and color  - [place] appropriate cooling/warming therapies per order  - Administer medications as ordered  - Instruct and encourage patient and family to use good hand hygiene technique  - Identify and instruct in appropriate isolation precautions for identified infection/condition  Outcome: Progressing  Goal: Absence of fever/infection during neutropenic period  Description: INTERVENTIONS:  - Monitor WBC    Outcome: Progressing     Problem: SAFETY ADULT  Goal: Patient will remain free of falls  Description: INTERVENTIONS:  - Educate patient/family on patient safety including physical limitations  - Instruct patient to call for assistance with activity   - Consult OT/PT to assist with strengthening/mobility   - Keep Call bell within reach  - Keep bed low and locked with side rails adjusted as appropriate  - Keep care items and personal belongings within reach  - Initiate and maintain comfort rounds  - Make Fall Risk Sign visible to staff  - Offer Toileting every 3 Hours, in advance of need  - Initiate/Maintain bed alarm  - Obtain necessary fall risk management equipment:   - Apply yellow socks and bracelet for high fall risk patients  - Consider moving patient to room near nurses station  Outcome: Progressing  Goal: Maintain or return to baseline ADL function  Description: INTERVENTIONS:  -  Assess patient's ability to carry out ADLs; assess patient's baseline for ADL function and identify physical deficits which impact ability to perform ADLs (bathing, care of mouth/teeth, toileting, grooming, dressing, etc )  - Assess/evaluate cause of self-care deficits   - Assess range of motion  - Assess patient's mobility; develop plan if impaired  - Assess patient's need for assistive devices and provide as appropriate  - Encourage maximum independence but intervene and supervise when necessary  - Involve family in performance of ADLs  - Assess for home care needs following discharge   - Consider OT consult to assist with ADL evaluation and planning for discharge  - Provide patient education as appropriate  Outcome: Progressing  Goal: Maintains/Returns to pre admission functional level  Description: INTERVENTIONS:  - Perform BMAT or MOVE assessment daily    - Set and communicate daily mobility goal to care team and patient/family/caregiver  - Collaborate with rehabilitation services on mobility goals if consulted  - Perform Range of Motion 3 times a day  - Reposition patient every 3 hours    - Dangle patient 3 times a day  - Stand patient 3 times a day  - Ambulate patient 3 times a day  - Out of bed to chair 3 times a day   - Out of bed for meals 3 times a day  - Out of bed for toileting  - Record patient progress and toleration of activity level   Outcome: Progressing

## 2022-01-10 NOTE — ASSESSMENT & PLAN NOTE
At baseline patient is minimally verbal with occasional agitation at night  · Continue home meds  · Continue supportive care  · Geriatrics following   · Has been doing well here  · Was placed on mitts last night, but now discontinued - D/W nursing to avoid further restraints

## 2022-01-11 PROCEDURE — 99232 SBSQ HOSP IP/OBS MODERATE 35: CPT | Performed by: INTERNAL MEDICINE

## 2022-01-11 PROCEDURE — 97535 SELF CARE MNGMENT TRAINING: CPT

## 2022-01-11 PROCEDURE — 99232 SBSQ HOSP IP/OBS MODERATE 35: CPT | Performed by: PHYSICIAN ASSISTANT

## 2022-01-11 RX ADMIN — OXYCODONE HYDROCHLORIDE 2.5 MG: 5 TABLET ORAL at 09:16

## 2022-01-11 RX ADMIN — ESCITALOPRAM OXALATE 10 MG: 10 TABLET ORAL at 08:57

## 2022-01-11 RX ADMIN — ACETAMINOPHEN 975 MG: 325 TABLET, FILM COATED ORAL at 13:35

## 2022-01-11 RX ADMIN — TAMSULOSIN HYDROCHLORIDE 0.4 MG: 0.4 CAPSULE ORAL at 08:57

## 2022-01-11 RX ADMIN — BUSPIRONE HYDROCHLORIDE 5 MG: 5 TABLET ORAL at 08:57

## 2022-01-11 RX ADMIN — LIDOCAINE 5% 1 PATCH: 700 PATCH TOPICAL at 09:02

## 2022-01-11 RX ADMIN — QUETIAPINE FUMARATE 25 MG: 25 TABLET ORAL at 22:31

## 2022-01-11 RX ADMIN — BUSPIRONE HYDROCHLORIDE 5 MG: 5 TABLET ORAL at 22:31

## 2022-01-11 RX ADMIN — PRAVASTATIN SODIUM 40 MG: 40 TABLET ORAL at 17:40

## 2022-01-11 RX ADMIN — ENOXAPARIN SODIUM 40 MG: 40 INJECTION SUBCUTANEOUS at 08:57

## 2022-01-11 RX ADMIN — DONEPEZIL HYDROCHLORIDE 10 MG: 10 TABLET ORAL at 08:57

## 2022-01-11 RX ADMIN — ACETAMINOPHEN 975 MG: 325 TABLET, FILM COATED ORAL at 22:31

## 2022-01-11 NOTE — PROGRESS NOTES
Progress Note - Geriatric Medicine   [facility] 67 y o  male MRN: [id number]  [facility] Encounter: [id number]      Assessment/Plan:    Ambulatory dysfunction with fall   Assessment & Plan  -reportedly mechanical fall at home 1/2/22  -head strike reported, unknown loss of conscious  -injuries as outlined below  -primarily wheelchair-bound at baseline and remains high risk recurrent falls in future due to deconditioning debility, poor safety awareness, impulsivity and underlying dementia  -continue fall precautions  -PT and OT following, appreciate input    * Closed fracture of neck of left femur (HCC)  Assessment & Plan  -s/p ORIF on 1/4/22  -continue neurovascular checks per protocol  -no acute blood loss anemia  -continue to monitor for acute and treat acute pain if present     Alzheimer's disease ([facility] [address] )  Assessment & Plan  -severe and progressive, currently dependent for all cares  -home regimen includes Aricept and Seroquel - Buspar added earlier in admission with notable benefit, continue regimen   -continue distraction techniques roney with changing and repositioning to minimize stressors to patient and grabbing at IV/lines etc as theses are most common times that pt restless resulting in restraints/mitts which will need to remain off for 24H for placement    -maintain calm and quiet environment reduce risk overstimulation    High risk of developing delirium   Assessment & Plan  -due to age, underlying dementia and comorbidities now s/p anesthesia in patient with history of acute metabolic encephalopathy during previous hospitalizations  -continue home donepezil and Seroquel regimen, if needed in short-term good consider low-dose Zyprexa 2 5mg Q8H PRN agitation unable to be redirected with conservative measures, please ensure pain and all other physiologic needs met and addressed before consideration of administration of medication for agitation as they are frequent precipitants of encephalopathy/agitation in patient sore unable to clearly communicate these needs due to underlying dementia/cognitive impairment    Impaired vision  Assessment & Plan  -requires use of corrective lenses, continue use at all appropriate times  -consider large font for printed materials provided to patient    Frailty syndrome in geriatric patient  Assessment & Plan  -clinical frailty scale stage 7/8, severely frail  -multifactorial including advanced dementia, age and multitude of chronic medical co-morbidities  -continue optimization of nutritional intake and chronic conditions as possible    Care coordination: rounded with Lore (RN) and Jessica (SLCELESTINA AP)    Subjective:     Patient seen examined bedside where is lying resting, appears comfortable in no acute stress  Nursing reports mildly restless overnight requiring meds which have since been removed and patient is now calm and cooperative  Nursing notified by patients that she had direct COVID exposure to family member at home and that she was in to visit patient yesterday before being informed of exposure by family member  Wife will be getting tested, patient will be monitored for signs/symptoms  Review of Systems   Unable to perform ROS: Mental status change     Objective:     Vitals: Blood pressure 134/80, pulse 78, temperature 97 5 °F (36 4 °C), resp  rate 18, SpO2 96 %  ,There is no height or weight on file to calculate BMI  Intake/Output Summary (Last 24 hours) at 1/11/2022 1107  Last data filed at 1/11/2022 0945  Gross per 24 hour   Intake 200 ml   Output --   Net 200 ml     Current Medications: Reviewed    Physical Exam:   Physical Exam  Vitals and nursing note reviewed  Constitutional:       General: He is not in acute distress  Comments: Thin, frail, elderly male    HENT:      Head: Normocephalic  Nose: Nose normal       Mouth/Throat:      Mouth: Mucous membranes are dry  Eyes:      General: No scleral icterus          Right eye: No discharge  Left eye: No discharge  Conjunctiva/sclera: Conjunctivae normal       Comments: Wearing glasses   Neck:      Comments: Trachea appears midline  Cardiovascular:      Rate and Rhythm: Normal rate  Pulses: Normal pulses  Pulmonary:      Effort: Pulmonary effort is normal  No respiratory distress  Breath sounds: No wheezing  Abdominal:      General: There is no distension  Palpations: Abdomen is soft  Musculoskeletal:      Cervical back: Neck supple  Right lower leg: No edema  Left lower leg: No edema  Comments: Diffuse generalized muscle loss   Skin:     General: Skin is warm and dry  Comments: Extensive ecchymosis bilateral forarms/wrists    Neurological:      Mental Status: He is alert  Comments: Awake and alert, appears to be near or at baseline   Psychiatric:      Comments: Pleasantly confused         Invasive Devices  Report    None               Lab, Imaging and other studies: I have personally reviewed pertinent reports

## 2022-01-11 NOTE — OCCUPATIONAL THERAPY NOTE
Occupational Therapy Treatment Note:       01/11/22 1530   OT Last Visit   OT Visit Date 01/11/22   Note Type   Note Type Treatment   Restrictions/Precautions   LLE Weight Bearing Per Order WBAT   Other Precautions Agitated;Cognitive; Bed Alarm;WBS;Fall Risk   Pain Assessment   Pain Assessment Tool FLACC   Pain Rating: FLACC (Rest) - Face 0   Pain Rating: FLACC (Rest) - Legs 0   Pain Rating: FLACC (Rest) - Activity 0   Pain Rating: FLACC (Rest) - Cry 0   Pain Rating: FLACC (Rest) - Consolability 0   Score: FLACC (Rest) 0   Pain Rating: FLACC (Activity) - Face 1   Pain Rating: FLACC (Activity) - Legs 0   Pain Rating: FLACC (Activity) - Activity 1   Pain Rating: FLACC (Activity) - Cry 0   Pain Rating: FLACC (Activity) - Consolability 0   Score: FLACC (Activity) 2   ADL   Eating Assistance 3  Moderate Assistance   Eating Comments pt held cup and drank from straw impulsively needing asst to terminate sucking on straw  UB Dressing Assistance 2  Maximal Assistance   UB Dressing Comments pt resisting and did not follow visual prompts  pt did take gown and attempt to push his head through large sleeve  Cognition   Overall Cognitive Status Impaired   Arousal/Participation Alert   Attention Difficulty attending to directions   Following Commands Follows one step commands inconsistently   Comments pt noted to keep hands on rubix cube  puzzle as a hand fidget for over 3 mintues this session  pt also with calmed demenor  when legs and lower body was caccooned in fitted sheet  pt is noted to remove clothes throughout day  when aggitated swings at therapist x 1 this session  pt is verbal but does not engage in conversation  pt is not oriented to place month nor situation   Assessment   Assessment pt participated in pm ot session and was seen focusing on self drinking, ub dressing, direction following and behavior management   pts bed was turned toward open window, lights were turned on and pt was provided with hand fidget tool  pt is noted to be tactile in that he removes clothes, plays with blankets etc  pt was able to drink from straw and cup with asst to controll speed / amt  pt requried max asst for ub dressing task  when approached to straighten pt in bed he was noted to swat at therapist  will follow focusing on goals from ie  Plan   Treatment Interventions ADL retraining;Functional transfer training; Endurance training;Cognitive reorientation;Patient/family training;Equipment evaluation/education; Activityengagement   Goal Expiration Date 01/19/22   OT Treatment Day 2   OT Frequency 2-3x/wk   Recommendation   OT Discharge Recommendation Post acute rehabilitation services   OT - OK to Discharge Yes   April A YANET Degroot

## 2022-01-11 NOTE — CASE MANAGEMENT
Case Management Progress Note    Patient name Corbin Springer  Location [address] [place] [address] [address] 435[address]61 MRN [id number]  : 1945 Date 2022       LOS (days): 9  Geometric Mean LOS (GMLOS) (days): 4 30  Days to GMLOS:-4 5        OBJECTIVE:        Current admission status: Inpatient  Preferred Pharmacy:   [place], [place] [address]  Phone: [phone] Fax: [phone]    Primary Care Provider: HORACIO Mcneil    Primary Insurance: MEDICARE  Secondary Insurance: [employer]    PROGRESS NOTE:  TC to CM and MV, left  to call this CM back with information as to whether facilities can accept patient for STR

## 2022-01-11 NOTE — PROGRESS NOTES
[facility] [facility]  Progress Note Lydia Kulkarni 1945, 67 y o  male MRN: [id number]  Unit/Bed#: [facility] 602-01 Encounter: [id number]  Primary Care Provider: HORACIO Mcdonald   Date and time admitted to hospital: 1/2/2022  2:07 PM    * Closed fracture of neck of left femur [facility])  Assessment & Plan  Secondary to mechanical fall   · Ortho following  · S/p left hip cannulated screw fixation on 1/4  · Continue pain control with scheduled Tylenol, Lidoderm patch, prn low dose oxycodone   · Continue with DVT ppx   · PT/OT recommending rehab, has been medically stable, CM following    Ambulatory dysfunction  Assessment & Plan  Previous history of right hip fracture status post ORIF on 11/5/2021 and now with left femoral fracture as above   · Fall precautions, safe ambulation  · PT/OT, needs rehab     Alzheimer's disease ([facility] [address] )  Assessment & Plan  At baseline patient is minimally verbal with occasional agitation at night  · Continue home meds  · Continue supportive care  · Geriatrics following   · Has been doing well here  Likes to use figit toys     Sacral decubitus ulcer, stage II ([facility] [address] )  Assessment & Plan  · Continue local wound care    Anemia  Assessment & Plan  Relatively chronic with baseline hemoglobin approximately 9-11  · Hgb stable as of last check          VTE Pharmacologic Prophylaxis: VTE Score: 10 High Risk (Score >/= 5) - Pharmacological DVT Prophylaxis Ordered: enoxaparin (Lovenox)  Sequential Compression Devices Ordered  Patient Centered Rounds: I performed bedside rounds with nursing staff today  Discussions with Specialists or Other Care Team Provider: case management    Education and Discussions with Family / Patient: Updated  (wife) via phone  Time Spent for Care: 30 minutes  More than 50% of total time spent on counseling and coordination of care as described above      Current Length of Stay: 9 day(s)  Current Patient Status: Inpatient Certification Statement: The patient will continue to require additional inpatient hospital stay due to rehab placement  Discharge Plan: Anticipate discharge tomorrow to rehab facility  Code Status: Level 3 - DNAR and DNI    Subjective:   Non events overnight per nursing  Pleasant during exam     Objective:     Vitals:   Temp (24hrs), Av 8 °F (36 6 °C), Min:97 4 °F (36 3 °C), Max:98 5 °F (36 9 °C)    Temp:  [97 4 °F (36 3 °C)-98 5 °F (36 9 °C)] 98 5 °F (36 9 °C)  HR:  [76-88] 88  Resp:  [18] 18  BP: (127-139)/(63-86) 127/63  SpO2:  [96 %-100 %] 100 %  There is no height or weight on file to calculate BMI  Input and Output Summary (last 24 hours): Intake/Output Summary (Last 24 hours) at 2022 1411  Last data filed at 2022 0945  Gross per 24 hour   Intake 200 ml   Output --   Net 200 ml       Physical Exam:   Physical Exam  Vitals and nursing note reviewed  Constitutional:       Appearance: He is well-developed  HENT:      Head: Normocephalic and atraumatic  Eyes:      Conjunctiva/sclera: Conjunctivae normal    Cardiovascular:      Rate and Rhythm: Normal rate and regular rhythm  Heart sounds: No murmur heard  Pulmonary:      Effort: Pulmonary effort is normal  No respiratory distress  Breath sounds: Normal breath sounds  Abdominal:      Palpations: Abdomen is soft  Tenderness: There is no abdominal tenderness  Musculoskeletal:      Cervical back: Neck supple  Skin:     General: Skin is warm and dry  Neurological:      Mental Status: He is alert  He is disoriented           Additional Data:     Labs:  Results from last 7 days   Lab Units 22  0442   WBC Thousand/uL 7 06   HEMOGLOBIN g/dL 10 0*   HEMATOCRIT % 31 4*   PLATELETS Thousands/uL 283   NEUTROS PCT % 68   LYMPHS PCT % 21   MONOS PCT % 8   EOS PCT % 2     Results from last 7 days   Lab Units 22  0442   SODIUM mmol/L 140   POTASSIUM mmol/L 3 8   CHLORIDE mmol/L 110*   CO2 mmol/L 22   BUN mg/dL 17   CREATININE mg/dL 0 54*   ANION GAP mmol/L 8   CALCIUM mg/dL 9 1   GLUCOSE RANDOM mg/dL 81         Results from last 7 days   Lab Units 01/04/22  1615   POC GLUCOSE mg/dl 85               Lines/Drains:  Invasive Devices  Report    None                       Imaging: No pertinent imaging reviewed  Recent Cultures (last 7 days):         Last 24 Hours Medication List:   Current Facility-Administered Medications   Medication Dose Route Frequency Provider Last Rate    acetaminophen  975 mg Oral [facility] Meghan Martínez MD      busPIRone  5 mg Oral BID Meghan Martínez MD      donepezil  10 mg Oral Daily Meghan Martínez MD      enoxaparin  40 mg Subcutaneous Daily Meghan Martínez MD      escitalopram  10 mg Oral Daily Meghan Martínez MD      fluticasone  1 spray Nasal Daily Meghan Martínez MD      lidocaine  1 patch Topical Daily Meghan Martínez MD      ondansetron  4 mg Intravenous Q6H PRN Meghan Martínez MD      oxyCODONE  2 5 mg Oral Q4H PRN Kayla MondayONESIMO      oxyCODONE  5 mg Oral Q4H PRN Kayla MondayONESIMO      pravastatin  40 mg Oral Daily With Libia King MD      QUEtiapine  25 mg Oral HS Meghan Martínez MD      senna-docusate sodium  1 tablet Oral HS Meghan Martínez MD      tamsulosin  0 4 mg Oral Daily Meghan Martínez MD          Today, Patient Was Seen By: Kayla Garner PA-C    **Please Note: This note may have been constructed using a voice recognition system  **

## 2022-01-11 NOTE — PLAN OF CARE
Problem: OCCUPATIONAL THERAPY ADULT  Goal: Performs self-care activities at highest level of function for planned discharge setting  See evaluation for individualized goals  Description: Treatment Interventions: ADL retraining,Functional transfer training,Endurance training,Cognitive reorientation,Patient/family training,Equipment evaluation/education,Compensatory technique education,Energy conservation,Activityengagement          See flowsheet documentation for full assessment, interventions and recommendations  Outcome: Progressing  Note: Limitation: Decreased ADL status,Decreased Safe judgement during ADL,Decreased cognition,Decreased endurance,Decreased high-level ADLs,Decreased self-care trans  Prognosis: Fair,Guarded  Assessment: pt participated in pm ot session and was seen focusing on self drinking, ub dressing, direction following and behavior management  pts bed was turned toward open window, lights were turned on and pt was provided with hand fidget tool  pt is noted to be tactile in that he removes clothes, plays with blankets etc  pt was able to drink from straw and cup with asst to controll speed / amt  pt requried max asst for ub dressing task  when approached to straighten pt in bed he was noted to swat at therapist  will follow focusing on goals from ie  Recommendation: Geriatric Consult (PT BEING FOLLOWED BY GERIATRICS )  OT Discharge Recommendation: Post acute rehabilitation services  OT - OK to Discharge:  Yes     April A YANET Degroot

## 2022-01-11 NOTE — ASSESSMENT & PLAN NOTE
At baseline patient is minimally verbal with occasional agitation at night  · Continue home meds  · Continue supportive care  · Geriatrics following   · Has been doing well here   Likes to use figit toys

## 2022-01-12 PROCEDURE — 99232 SBSQ HOSP IP/OBS MODERATE 35: CPT | Performed by: INTERNAL MEDICINE

## 2022-01-12 PROCEDURE — 99232 SBSQ HOSP IP/OBS MODERATE 35: CPT | Performed by: PHYSICIAN ASSISTANT

## 2022-01-12 PROCEDURE — 99239 HOSP IP/OBS DSCHRG MGMT >30: CPT | Performed by: FAMILY MEDICINE

## 2022-01-12 RX ORDER — ACETAMINOPHEN 325 MG/1
975 TABLET ORAL EVERY 8 HOURS SCHEDULED
Refills: 0 | Status: CANCELLED
Start: 2022-01-12

## 2022-01-12 RX ADMIN — ACETAMINOPHEN 975 MG: 325 TABLET, FILM COATED ORAL at 13:04

## 2022-01-12 RX ADMIN — ENOXAPARIN SODIUM 40 MG: 40 INJECTION SUBCUTANEOUS at 08:12

## 2022-01-12 RX ADMIN — ACETAMINOPHEN 975 MG: 325 TABLET, FILM COATED ORAL at 21:06

## 2022-01-12 RX ADMIN — TAMSULOSIN HYDROCHLORIDE 0.4 MG: 0.4 CAPSULE ORAL at 08:12

## 2022-01-12 RX ADMIN — BUSPIRONE HYDROCHLORIDE 5 MG: 5 TABLET ORAL at 21:07

## 2022-01-12 RX ADMIN — ACETAMINOPHEN 975 MG: 325 TABLET, FILM COATED ORAL at 05:04

## 2022-01-12 RX ADMIN — ESCITALOPRAM OXALATE 10 MG: 10 TABLET ORAL at 08:12

## 2022-01-12 RX ADMIN — BUSPIRONE HYDROCHLORIDE 5 MG: 5 TABLET ORAL at 08:11

## 2022-01-12 RX ADMIN — DONEPEZIL HYDROCHLORIDE 10 MG: 10 TABLET ORAL at 08:11

## 2022-01-12 RX ADMIN — OXYCODONE HYDROCHLORIDE 5 MG: 5 TABLET ORAL at 18:06

## 2022-01-12 RX ADMIN — FLUTICASONE PROPIONATE 1 SPRAY: 50 SPRAY, METERED NASAL at 08:12

## 2022-01-12 RX ADMIN — QUETIAPINE FUMARATE 25 MG: 25 TABLET ORAL at 21:06

## 2022-01-12 RX ADMIN — LIDOCAINE 5% 1 PATCH: 700 PATCH TOPICAL at 08:12

## 2022-01-12 RX ADMIN — PRAVASTATIN SODIUM 40 MG: 40 TABLET ORAL at 17:59

## 2022-01-12 NOTE — PLAN OF CARE
Problem: PAIN - ADULT  Goal: Verbalizes/displays adequate comfort level or baseline comfort level  Description: Interventions:  - Encourage patient to monitor pain and request assistance  - Assess pain using appropriate pain scale  - Administer analgesics based on type and severity of pain and evaluate response  - Implement non-pharmacological measures as appropriate and evaluate response  - Consider cultural and social influences on pain and pain management  - Notify physician/advanced practitioner if interventions unsuccessful or patient reports new pain  Outcome: Progressing     Problem: INFECTION - ADULT  Goal: Absence or prevention of progression during hospitalization  Description: INTERVENTIONS:  - Assess and monitor for signs and symptoms of infection  - Monitor lab/diagnostic results  - Monitor all insertion sites, i e  indwelling lines, tubes, and drains  - Monitor endotracheal if appropriate and nasal secretions for changes in amount and color  - [place] appropriate cooling/warming therapies per order  - Administer medications as ordered  - Instruct and encourage patient and family to use good hand hygiene technique  - Identify and instruct in appropriate isolation precautions for identified infection/condition  Outcome: Progressing     Problem: SAFETY ADULT  Goal: Patient will remain free of falls  Description: INTERVENTIONS:  - Educate patient/family on patient safety including physical limitations  - Instruct patient to call for assistance with activity   - Consult OT/PT to assist with strengthening/mobility   - Keep Call bell within reach  - Keep bed low and locked with side rails adjusted as appropriate  - Keep care items and personal belongings within reach  - Initiate and maintain comfort rounds  - Make Fall Risk Sign visible to staff  - Offer Toileting every 2 Hours, in advance of need  - Initiate/Maintain bed alarm  - Obtain necessary fall risk management equipment:   - Apply yellow socks and bracelet for high fall risk patients  - Consider moving patient to room near nurses station  Outcome: Progressing  Goal: Maintain or return to baseline ADL function  Description: INTERVENTIONS:  -  Assess patient's ability to carry out ADLs; assess patient's baseline for ADL function and identify physical deficits which impact ability to perform ADLs (bathing, care of mouth/teeth, toileting, grooming, dressing, etc )  - Assess/evaluate cause of self-care deficits   - Assess range of motion  - Assess patient's mobility; develop plan if impaired  - Assess patient's need for assistive devices and provide as appropriate  - Encourage maximum independence but intervene and supervise when necessary  - Involve family in performance of ADLs  - Assess for home care needs following discharge   - Consider OT consult to assist with ADL evaluation and planning for discharge  - Provide patient education as appropriate  Outcome: Progressing  Goal: Maintains/Returns to pre admission functional level  Description: INTERVENTIONS:  - Perform BMAT or MOVE assessment daily    - Set and communicate daily mobility goal to care team and patient/family/caregiver  - Collaborate with rehabilitation services on mobility goals if consulted  - Reposition patient every 2 hours    - Out of bed for toileting  - Record patient progress and toleration of activity level   Outcome: Progressing     Problem: DISCHARGE PLANNING  Goal: Discharge to home or other facility with appropriate resources  Description: INTERVENTIONS:  - Identify barriers to discharge w/patient and caregiver  - Arrange for needed discharge resources and transportation as appropriate  - Identify discharge learning needs (meds, wound care, etc )  - Arrange for interpretive services to assist at discharge as needed  - Refer to Case Management Department for coordinating discharge planning if the patient needs post-hospital services based on physician/advanced practitioner order or complex needs related to functional status, cognitive ability, or social support system  Outcome: Progressing     Problem: Knowledge Deficit  Goal: Patient/family/caregiver demonstrates understanding of disease process, treatment plan, medications, and discharge instructions  Description: Complete learning assessment and assess knowledge base    Interventions:  - Provide teaching at level of understanding  - Provide teaching via preferred learning methods  Outcome: Progressing     Problem: Potential for Falls  Goal: Patient will remain free of falls  Description: INTERVENTIONS:  - Educate patient/family on patient safety including physical limitations  - Instruct patient to call for assistance with activity   - Consult OT/PT to assist with strengthening/mobility   - Keep Call bell within reach  - Keep bed low and locked with side rails adjusted as appropriate  - Keep care items and personal belongings within reach  - Initiate and maintain comfort rounds  - Make Fall Risk Sign visible to staff  - Offer Toileting every 2 Hours, in advance of need  - Initiate/Maintain bed alarm  - Obtain necessary fall risk management equipment:   - Apply yellow socks and bracelet for high fall risk patients  - Consider moving patient to room near nurses station  Outcome: Progressing     Problem: Prexisting or High Potential for Compromised Skin Integrity  Goal: Skin integrity is maintained or improved  Description: INTERVENTIONS:  - Identify patients at risk for skin breakdown  - Assess and monitor skin integrity  - Assess and monitor nutrition and hydration status  - Monitor labs   - Assess for incontinence   - Turn and reposition patient  - Assist with mobility/ambulation  - Relieve pressure over bony prominences  - Avoid friction and shearing  - Provide appropriate hygiene as needed including keeping skin clean and dry  - Evaluate need for skin moisturizer/barrier cream  - Collaborate with interdisciplinary team   - Patient/family teaching  - Consider wound care consult   Outcome: Progressing

## 2022-01-12 NOTE — ASSESSMENT & PLAN NOTE
Previous history of right hip fracture status post ORIF on 11/5/2021 and now with left femoral fracture as above   · Fall precautions, safe ambulation  · PT/OT, needs rehab Authored by Resident/PA/NP

## 2022-01-12 NOTE — CASE MANAGEMENT
Case Management Discharge Planning Note    Patient name Roseland Bound  Location [address] 602/[facility]HP 337-08 MRN [id number]  : 1945 Date 2022       Current Admission Date: 2022  Current Admission Diagnosis:Closed fracture of neck of left femur [facility])   Patient Active Problem List    Diagnosis Date Noted    Frailty syndrome in geriatric patient 2022    Impaired vision 2022    High risk of developing delirium  2022    Closed fracture of neck of left femur ([address] ) 2022    Ambulatory dysfunction 2022    Moderate protein-calorie malnutrition ([address] ) 2021    Sacral decubitus ulcer, stage II ([address] ) 2021    Encephalopathy 2021    Fall 2021    Closed right hip fracture ([address] ) 2021    Dysphagia 2021    S/P total knee replacement, right 2021    Anemia 2021    Alzheimer's disease ([facility] [address] ) 2020    Memory impairment 2020    Hypercholesterolemia 2020    Benign prostatic hyperplasia without lower urinary tract symptoms 2020    Essential hypertension 2020    Elevated fasting glucose 2020      LOS (days): 10  Geometric Mean LOS (GMLOS) (days): 4 30  Days to GMLOS:-5 4     OBJECTIVE:  Risk of Unplanned Readmission Score: 22         Current admission status: Inpatient   Preferred Pharmacy:   [facility]/pharmacy #[id number] Riggs [address] [place] [address]  Phone: [phone] Fax: [phone]    Primary Care Provider: HORACIO Navarro    Primary Insurance: MEDICARE  Secondary Insurance: [employer]    DISCHARGE DETAILS:    Message to [address] for update on bed availability    Spoke with wife, 1st choice [facility]    IMM reviewed with wife    PASSR completed and uploaded Marylene Czech    1430-notified wife [facility] cannot accept  [facility] will advise when they can    She is agreeable to [facility] IMM Given (Date):: 01/12/22  IMM Given to[de-identified] Family

## 2022-01-12 NOTE — PROGRESS NOTES
[address] [facility]  Progress Note Jocelyn Smith 1945, 67 y o  male MRN: [id number]  Unit/Bed#: [facility] 602-01 Encounter: [id number]  Primary Care Provider: HORACIO Appiah   Date and time admitted to hospital: 1/2/2022  2:07 PM    * Closed fracture of neck of left femur [facility])  Assessment & Plan  Secondary to mechanical fall   · Ortho following  · S/p left hip cannulated screw fixation on 1/4  · Continue pain control with scheduled Tylenol, Lidoderm patch, prn low dose oxycodone   · Continue with DVT ppx   · PT/OT recommending rehab, has been medically stable, CM following    Ambulatory dysfunction  Assessment & Plan  Previous history of right hip fracture status post ORIF on 11/5/2021 and now with left femoral fracture as above   · Fall precautions, safe ambulation  · PT/OT, needs rehab     Alzheimer's disease ([facility] [address] )  Assessment & Plan  At baseline patient is minimally verbal with occasional agitation at night  · Continue home meds  · Continue supportive care  · Geriatrics following   · Has been doing well here  Likes to use figit toys     Sacral decubitus ulcer, stage II ([facility] [address] )  Assessment & Plan  · Continue local wound care    Anemia  Assessment & Plan  Relatively chronic with baseline hemoglobin approximately 9-11  · Hgb stable as of last check          VTE Pharmacologic Prophylaxis: VTE Score: 10 High Risk (Score >/= 5) - Pharmacological DVT Prophylaxis Ordered: enoxaparin (Lovenox)  Sequential Compression Devices Ordered  Patient Centered Rounds: I performed bedside rounds with nursing staff today  Discussions with Specialists or Other Care Team Provider: case management    Education and Discussions with Family / Patient: Updated  (wife) via phone  Time Spent for Care: 30 minutes  More than 50% of total time spent on counseling and coordination of care as described above      Current Length of Stay: 10 day(s)  Current Patient Status: Inpatient Certification Statement: The patient will continue to require additional inpatient hospital stay due to pending placement  Discharge Plan: Anticipate discharge tomorrow to rehab facility  Code Status: Level 3 - DNAR and DNI    Subjective:   No events overnight per nursing  Patient ate breakfast for me this morning  Objective:     Vitals:   Temp (24hrs), Av 9 °F (37 7 °C), Min:99 5 °F (37 5 °C), Max:100 3 °F (37 9 °C)    Temp:  [99 5 °F (37 5 °C)-100 3 °F (37 9 °C)] 99 5 °F (37 5 °C)  HR:  [] 77  Resp:  [15-16] 15  BP: (140-143)/(86-91) 140/86  SpO2:  [97 %-98 %] 97 %  There is no height or weight on file to calculate BMI  Input and Output Summary (last 24 hours): Intake/Output Summary (Last 24 hours) at 2022 1403  Last data filed at 2022 1020  Gross per 24 hour   Intake 478 ml   Output --   Net 478 ml       Physical Exam:   Physical Exam  Vitals and nursing note reviewed  Constitutional:       Appearance: He is well-developed  HENT:      Head: Normocephalic and atraumatic  Eyes:      Conjunctiva/sclera: Conjunctivae normal    Cardiovascular:      Rate and Rhythm: Normal rate and regular rhythm  Heart sounds: No murmur heard  Pulmonary:      Effort: Pulmonary effort is normal  No respiratory distress  Breath sounds: Normal breath sounds  Abdominal:      Palpations: Abdomen is soft  Tenderness: There is no abdominal tenderness  Musculoskeletal:      Cervical back: Neck supple  Skin:     General: Skin is warm and dry  Neurological:      Mental Status: He is alert  He is disoriented  Comments: Non verbal   Psychiatric:         Cognition and Memory: Cognition is impaired  Memory is impaired           Additional Data:     Labs:  Results from last 7 days   Lab Units 22  0442   WBC Thousand/uL 7 06   HEMOGLOBIN g/dL 10 0*   HEMATOCRIT % 31 4*   PLATELETS Thousands/uL 283   NEUTROS PCT % 68   LYMPHS PCT % 21   MONOS PCT % 8   EOS PCT % 2 Results from last 7 days   Lab Units 01/09/22  0442   SODIUM mmol/L 140   POTASSIUM mmol/L 3 8   CHLORIDE mmol/L 110*   CO2 mmol/L 22   BUN mg/dL 17   CREATININE mg/dL 0 54*   ANION GAP mmol/L 8   CALCIUM mg/dL 9 1   GLUCOSE RANDOM mg/dL 81                       Lines/Drains:  Invasive Devices  Report    None                       Imaging: No pertinent imaging reviewed  Recent Cultures (last 7 days):         Last 24 Hours Medication List:   Current Facility-Administered Medications   Medication Dose Route Frequency Provider Last Rate    acetaminophen  975 mg Oral [facility] Alan Humphreys MD      busPIRone  5 mg Oral BID Alan Humphreys MD      donepezil  10 mg Oral Daily Alan Humphreys MD      enoxaparin  40 mg Subcutaneous Daily Alan Humphreys MD      escitalopram  10 mg Oral Daily Alan Hmuphreys MD      fluticasone  1 spray Nasal Daily Alan Humphreys MD      lidocaine  1 patch Topical Daily Alan Humphreys MD      ondansetron  4 mg Intravenous Q6H PRN Alan Humphreys MD      oxyCODONE  2 5 mg Oral Q4H PRN Shane Chong PA-C      oxyCODONE  5 mg Oral Q4H PRN Shane Chong PA-C      pravastatin  40 mg Oral Daily With Bin Roque MD      QUEtiapine  25 mg Oral HS Alan Humphreys MD      senna-docusate sodium  1 tablet Oral HS Alan Humphreys MD      tamsulosin  0 4 mg Oral Daily Alan Humphreys MD          Today, Patient Was Seen By: Shane Chong PA-C    **Please Note: This note may have been constructed using a voice recognition system  **

## 2022-01-12 NOTE — PROGRESS NOTES
Progress Note - Geriatric Medicine   Corbin Springer 67 y o  male MRN: [id number]  Unit/Bed#: [facility] 602-01 Encounter: [id number]      Assessment/Plan:    Alzheimer's disease ([facility] )  Assessment & Plan  -severe and progressive, remains dependent for all cares  -home regimen includes Aricept and Seroquel - Buspar added earlier in admission with notable benefit, continue current regimen   -continue distraction techniques roney with changing and repositioning to reduce stressors to patient   -remains off soft restraints and has been doing extremely well with redirection  -maintain calm and quiet environment reduce risk overstimulation    Ambulatory dysfunction with fall  Assessment & Plan  -reportedly mechanical fall at home 1/2/22  -head strike reported, unknown loss of conscious  -injuries as outlined below  -primarily wheelchair-bound at baseline and remains high risk recurrent falls in future due to deconditioning debility, poor safety awareness, impulsivity and underlying dementia  -continue fall precautions  -PT and OT following, appreciate input    * Closed fracture of neck of left femur ([facility] )  Assessment & Plan  -s/p ORIF on 1/4/22  -continue neurovascular checks per protocol  -no acute blood loss anemia  -appears comfortable at rest, consider premedicating with Tylenol before cares in the acute setting to ensure any underlying pain or shortness is treated    Frailty syndrome in geriatric patient  Assessment & Plan  -clinical frailty scale stage 7/8, severely frail  -multifactorial including advanced dementia, age and multitude of chronic medical co-morbidities  -continue optimization of nutritional intake and chronic conditions as possible  -continue to ensure that treatments and interventions are in line with patient's and family's wishes and goals of care    Impaired vision  Assessment & Plan  -requires use of corrective lenses, continue use at all appropriate times    High risk of developing delirium   Assessment & Plan  -multifactorial including history of encephalopathy during previous hospitalizations  -continue home donepezil and Seroquel regimen, if needed in short-term good consider low-dose Zyprexa 2 5mg Q8H PRN agitation unable to be redirected with conservative measures, please ensure pain and all other physiologic needs met and addressed before consideration of administration of medication for agitation as they are frequent precipitants of encephalopathy/agitation in patient sore unable to clearly communicate these needs due to underlying dementia/cognitive impairment    Care coordination:  Rounded with Albert Iniguez (RN)    Subjective:     Patient seen examined bedside where he is lying resting, nursing reports he slept well overnight and there were no acute events  Review of Systems   Unable to perform ROS: Dementia     Objective:     Vitals: Blood pressure 140/86, pulse 77, temperature 99 5 °F (37 5 °C), resp  rate 15, SpO2 97 %  ,There is no height or weight on file to calculate BMI  Intake/Output Summary (Last 24 hours) at 1/12/2022 0732  Last data filed at 1/11/2022 1831  Gross per 24 hour   Intake 318 ml   Output --   Net 318 ml     Current Medications: Reviewed    Physical Exam:   Physical Exam  Vitals and nursing note reviewed  Constitutional:       General: He is not in acute distress  Comments: Sleeping but easily awakens to voice   HENT:      Head: Normocephalic  Nose: Nose normal       Mouth/Throat:      Mouth: Mucous membranes are moist    Eyes:      General:         Right eye: No discharge  Left eye: No discharge  Comments: Periorbital areas with mild sunken appearance   Neck:      Comments: Trachea appears midline  Cardiovascular:      Rate and Rhythm: Normal rate  Heart sounds: Murmur heard  Pulmonary:      Effort: Pulmonary effort is normal  No respiratory distress  Abdominal:      General: Bowel sounds are normal  There is no distension        Palpations: Abdomen is soft  Tenderness: There is no abdominal tenderness  Musculoskeletal:      Right lower leg: Edema ( trace) present  Left lower leg: Edema (Trace) present  Skin:     General: Skin is warm and dry  Neurological:      Comments: Being but awakens to voice, mostly nonverbal which is baseline   Psychiatric:      Comments: Cooperative        Invasive Devices  Report    None               Lab, Imaging and other studies: I have personally reviewed pertinent reports

## 2022-01-12 NOTE — PLAN OF CARE
Problem: PAIN - ADULT  Goal: Verbalizes/displays adequate comfort level or baseline comfort level  Description: Interventions:  - Encourage patient to monitor pain and request assistance  - Assess pain using appropriate pain scale  - Administer analgesics based on type and severity of pain and evaluate response  - Implement non-pharmacological measures as appropriate and evaluate response  - Consider cultural and social influences on pain and pain management  - Notify physician/advanced practitioner if interventions unsuccessful or patient reports new pain  Outcome: Progressing     Problem: INFECTION - ADULT  Goal: Absence or prevention of progression during hospitalization  Description: INTERVENTIONS:  - Assess and monitor for signs and symptoms of infection  - Monitor lab/diagnostic results  - Monitor all insertion sites, i e  indwelling lines, tubes, and drains  - Monitor endotracheal if appropriate and nasal secretions for changes in amount and color  - [place] appropriate cooling/warming therapies per order  - Administer medications as ordered  - Instruct and encourage patient and family to use good hand hygiene technique  - Identify and instruct in appropriate isolation precautions for identified infection/condition  Outcome: Progressing     Problem: SAFETY ADULT  Goal: Patient will remain free of falls  Description: INTERVENTIONS:  - Educate patient/family on patient safety including physical limitations  - Instruct patient to call for assistance with activity   - Consult OT/PT to assist with strengthening/mobility   - Keep Call bell within reach  - Keep bed low and locked with side rails adjusted as appropriate  - Keep care items and personal belongings within reach  - Initiate and maintain comfort rounds  - Make Fall Risk Sign visible to staff  - Offer Toileting every 2 Hours, in advance of need  - Initiate/Maintain bed alarm  - Obtain necessary fall risk management equipment:   - Apply yellow socks and bracelet for high fall risk patients  - Consider moving patient to room near nurses station  Outcome: Progressing  Goal: Maintain or return to baseline ADL function  Description: INTERVENTIONS:  -  Assess patient's ability to carry out ADLs; assess patient's baseline for ADL function and identify physical deficits which impact ability to perform ADLs (bathing, care of mouth/teeth, toileting, grooming, dressing, etc )  - Assess/evaluate cause of self-care deficits   - Assess range of motion  - Assess patient's mobility; develop plan if impaired  - Assess patient's need for assistive devices and provide as appropriate  - Encourage maximum independence but intervene and supervise when necessary  - Involve family in performance of ADLs  - Assess for home care needs following discharge   - Consider OT consult to assist with ADL evaluation and planning for discharge  - Provide patient education as appropriate  Outcome: Progressing  Goal: Maintains/Returns to pre admission functional level  Description: INTERVENTIONS:  - Perform BMAT or MOVE assessment daily    - Set and communicate daily mobility goal to care team and patient/family/caregiver  - Collaborate with rehabilitation services on mobility goals if consulted  - Reposition patient every 2 hours    - Out of bed for toileting  - Record patient progress and toleration of activity level   Outcome: Progressing

## 2022-01-12 NOTE — DISCHARGE SUMMARY
[facility] 1945[facility] 67 [facility]  [facility] MRN: [id number]  Unit/Bed#: [facility] 630-01 Encounter: [id number]  Primary Care Provider: HORACIO Camarena   Date and time admitted to hospital: 1/2/2022  2:07 PM    * Closed fracture of neck of left femur [facility])  Assessment & Plan  Secondary to mechanical fall   · Ortho following  · S/p left hip cannulated screw fixation on 1/4  · Continue pain control with scheduled Tylenol, Lidoderm patch, prn low dose oxycodone   · Continue with DVT ppx   · PT/OT recommending rehab, has been medically stable, CM following    COVID  Assessment & Plan  · Positive 1/13 on screening test for SNF  · asymptomatic     Ambulatory dysfunction  Assessment & Plan  Previous history of right hip fracture status post ORIF on 11/5/2021 and now with left femoral fracture as above   · Fall precautions, safe ambulation  · PT/OT, needs rehab     [facility] ([facility] )  Assessment & Plan  At baseline patient is minimally verbal with occasional agitation at night  · Continue home meds  · Continue supportive care  · Geriatrics following   · Has been doing well here   Likes to use figit toys     Sacral decubitus ulcer, stage II ([facility] )  Assessment & Plan  · Continue local wound care    Anemia  Assessment & Plan  Relatively chronic with baseline hemoglobin approximately 9-11  · Hgb stable as of last check      Medical Problems             Resolved Problems  Date Reviewed: 1/14/2022    None              Discharging Physician / Practitioner: Rick Andujar PA-C  PCP: Meg Pickering [address] [place] [address]  Admission Date:   Admission Orders (From admission, onward)     Ordered        01/02/22 1746  Inpatient Admission  Once                      Discharge Date: 1/15/2022  Disposition:    Other [facility]     Reason for Admission: fall and left hip pain    Discharge Diagnoses:   Please see assessment and plan section above for further details regarding discharge diagnoses  Consultations During Hospital Stay:  · PMR  · Dr Rob Berg  · Dr Maria D Miner    Procedures Performed:      XR left hip - Impacted left femoral neck fracture unchanged from the recent prior CT  CXR - No acute cardiopulmonary disease    CT pelvis - New acute impacted transcervical fracture of left femoral neck  Partially imaged healing internally fixed intertrochanteric fracture of right femoral neck  Significant Findings / Test Results:   · See above    Incidental Findings:   · none     Test Results Pending at Discharge (will require follow up):   · none     Outpatient Tests Requested:  · none    Complications:  none    Hospital Course:      Trini Glynn is a 67 y o  male patient who originally presented to the hospital on 1/2/2022 due to fall and left femoral neck fracture  Patient underwent left hip cannulated screw fixation on 1/4  Hospital course was prolonged by lack of short term rehab availability  Patient tested positive for COVID on 01/13 on his screening test for rehab  He is currently asymptomatic  Wife updated over the phone  Condition at Discharge: good    Discharge Day Visit / Exam:   Subjective:  Patient no verbal  No events overnight per nursing  Vitals: Blood Pressure: 129/74 (01/14/22 0814)  Pulse: 65 (01/14/22 0814)  Temperature: (!) 95 9 °F (35 5 °C) (01/14/22 0000)  Temp Source: Tympanic (01/14/22 0000)  Respirations: 16 (01/14/22 0814)  SpO2: 95 % (01/14/22 0814)  Exam:   Physical Exam  Vitals and nursing note reviewed  Constitutional:       Appearance: He is well-developed  HENT:      Head: Normocephalic and atraumatic  Eyes:      Conjunctiva/sclera: Conjunctivae normal    Cardiovascular:      Rate and Rhythm: Normal rate and regular rhythm  Heart sounds: No murmur heard  Pulmonary:      Effort: Pulmonary effort is normal  No respiratory distress  Breath sounds: Normal breath sounds  Abdominal:      Palpations: Abdomen is soft  Tenderness: There is no abdominal tenderness  Musculoskeletal:      Cervical back: Neck supple  Comments: Surgical site C/D/I   Skin:     General: Skin is warm and dry  Neurological:      Mental Status: He is alert  He is disoriented  Psychiatric:         Cognition and Memory: Cognition is impaired  Memory is impaired  Judgment: Judgment is impulsive  Discussion with Family: wife called with update    Medication Adjustments and Discharge Medications:  · Discharge Medication List: See after visit summary for reconciled discharge medications  · Medication Dosing Tapers - Please refer to Discharge Medication List for details on any medication dosing tapers (if applicable to patient)  · Summary of Medication Adjustments made as a result of this hospitalization:   · Medications being temporarily held (include recommended restart time): Wound Care Recommendations:  When applicable, please see wound care section of After Visit Summary  Instructions for any Catheters / Lines Present at Discharge (including removal date, if applicable):     Diet Recommendations at Discharge:  Diet -        Diet Orders   (From admission, onward)             Start     Ordered    01/04/22 1742  Diet Regular; Regular House  Diet effective now        References:    Nutrtion Support Algorithm Enteral vs  Parenteral   Question Answer Comment   Diet Type Regular    Regular Regular House    RD to adjust diet per protocol? Yes        01/04/22 1741                Goals of Care Discussions:  · Code Status at Discharge: Level 3 - DNAR and DNI  · Goals of care were not discussed during this admission  Discharge instructions/Information to patient and family:   See after visit summary section titled Discharge Instructions for information provided to patient and family  Planned Readmission: none      Discharge Statement:  I spent 45 minutes discharging the patient   This time was spent on the day of discharge  I had direct contact with the patient on the day of discharge  Greater than 50% of the total time was spent examining patient, answering all patient questions, arranging and discussing plan of care with patient as well as directly providing post-discharge instructions  Additional time then spent on discharge activities  **Please Note: This note may have been constructed using a voice recognition system  **

## 2022-01-13 ENCOUNTER — PATIENT OUTREACH (OUTPATIENT)
Dept: CASE MANAGEMENT | Facility: HOSPITAL | Age: 77
End: 2022-01-13

## 2022-01-13 LAB
FLUAV RNA RESP QL NAA+PROBE: NEGATIVE
FLUBV RNA RESP QL NAA+PROBE: NEGATIVE
RSV RNA RESP QL NAA+PROBE: NEGATIVE
SARS-COV-2 RNA RESP QL NAA+PROBE: POSITIVE

## 2022-01-13 PROCEDURE — 99232 SBSQ HOSP IP/OBS MODERATE 35: CPT | Performed by: PHYSICIAN ASSISTANT

## 2022-01-13 PROCEDURE — 97530 THERAPEUTIC ACTIVITIES: CPT

## 2022-01-13 PROCEDURE — 0241U HB NFCT DS VIR RESP RNA 4 TRGT: CPT | Performed by: PHYSICIAN ASSISTANT

## 2022-01-13 PROCEDURE — 97535 SELF CARE MNGMENT TRAINING: CPT

## 2022-01-13 RX ADMIN — ACETAMINOPHEN 975 MG: 325 TABLET, FILM COATED ORAL at 06:06

## 2022-01-13 RX ADMIN — ACETAMINOPHEN 975 MG: 325 TABLET, FILM COATED ORAL at 21:38

## 2022-01-13 RX ADMIN — DONEPEZIL HYDROCHLORIDE 10 MG: 10 TABLET ORAL at 10:24

## 2022-01-13 RX ADMIN — LIDOCAINE 5% 1 PATCH: 700 PATCH TOPICAL at 10:24

## 2022-01-13 RX ADMIN — OXYCODONE HYDROCHLORIDE 5 MG: 5 TABLET ORAL at 10:22

## 2022-01-13 RX ADMIN — ESCITALOPRAM OXALATE 10 MG: 10 TABLET ORAL at 10:22

## 2022-01-13 RX ADMIN — BUSPIRONE HYDROCHLORIDE 5 MG: 5 TABLET ORAL at 10:22

## 2022-01-13 RX ADMIN — QUETIAPINE FUMARATE 25 MG: 25 TABLET ORAL at 21:38

## 2022-01-13 RX ADMIN — ENOXAPARIN SODIUM 40 MG: 40 INJECTION SUBCUTANEOUS at 10:22

## 2022-01-13 RX ADMIN — TAMSULOSIN HYDROCHLORIDE 0.4 MG: 0.4 CAPSULE ORAL at 10:22

## 2022-01-13 RX ADMIN — OXYCODONE HYDROCHLORIDE 5 MG: 5 TABLET ORAL at 21:38

## 2022-01-13 RX ADMIN — BUSPIRONE HYDROCHLORIDE 5 MG: 5 TABLET ORAL at 21:38

## 2022-01-13 RX ADMIN — FLUTICASONE PROPIONATE 1 SPRAY: 50 SPRAY, METERED NASAL at 10:23

## 2022-01-13 RX ADMIN — PRAVASTATIN SODIUM 40 MG: 40 TABLET ORAL at 16:26

## 2022-01-13 NOTE — CASE MANAGEMENT
Case Management Progress Note    Patient name Evgeny Garcia  Location [address] [place] [address] [address] [id number]  : 1945 Date 2022       LOS (days): 11  Geometric Mean LOS (GMLOS) (days): 4 30  Days to GMLOS:-6 5        OBJECTIVE:        Current admission status: Inpatient  Preferred Pharmacy:   [id number]- [address] [place] [address]  Phone: [phone] Fax: [phone]    Primary Care Provider: HORACIO Whitten    Primary Insurance: MEDICARE  Secondary Insurance: [employer]    PROGRESS NOTE: Patient tested positive for COVID    TC to Pam GAMBLE to check on availability of COVID + rehab beds and return call to this MAVIS

## 2022-01-13 NOTE — PLAN OF CARE
Problem: PAIN - ADULT  Goal: Verbalizes/displays adequate comfort level or baseline comfort level  Description: Interventions:  - Encourage patient to monitor pain and request assistance  - Assess pain using appropriate pain scale  - Administer analgesics based on type and severity of pain and evaluate response  - Implement non-pharmacological measures as appropriate and evaluate response  - Consider cultural and social influences on pain and pain management  - Notify physician/advanced practitioner if interventions unsuccessful or patient reports new pain  Outcome: Progressing     Problem: INFECTION - ADULT  Goal: Absence or prevention of progression during hospitalization  Description: INTERVENTIONS:  - Assess and monitor for signs and symptoms of infection  - Monitor lab/diagnostic results  - Monitor all insertion sites, i e  indwelling lines, tubes, and drains  - Monitor endotracheal if appropriate and nasal secretions for changes in amount and color  - [place] appropriate cooling/warming therapies per order  - Administer medications as ordered  - Instruct and encourage patient and family to use good hand hygiene technique  - Identify and instruct in appropriate isolation precautions for identified infection/condition  Outcome: Progressing

## 2022-01-13 NOTE — PROGRESS NOTES
[address] [facility]  Progress Note Murray Chol 1945, 67 y o  male MRN: [id number]  Unit/Bed#: [facility] 602-01 Encounter: [id number]  Primary Care Provider: HORACIO Arredondo   Date and time admitted to hospital: 1/2/2022  2:07 PM    Addendum: Patient tested positive for Covid on his screening test for SNF  Currently without symptoms  Will monitor closely  Wife updated  Her daughter tested positive this week, but did not visit the patient   Wife did visit the patient earlier this week, but she had a negative test the day after she saw him and has a pending test from today  * Closed fracture of neck of left femur (HCC)  Assessment & Plan  Secondary to mechanical fall   · Ortho following  · S/p left hip cannulated screw fixation on 1/4  · Continue pain control with scheduled Tylenol, Lidoderm patch, prn low dose oxycodone   · Continue with DVT ppx   · PT/OT recommending rehab, has been medically stable, CM following    Ambulatory dysfunction  Assessment & Plan  Previous history of right hip fracture status post ORIF on 11/5/2021 and now with left femoral fracture as above   · Fall precautions, safe ambulation  · PT/OT, needs rehab     Alzheimer's disease ([facility] )  Assessment & Plan  At baseline patient is minimally verbal with occasional agitation at night  · Continue home meds  · Continue supportive care  · Geriatrics following   · Has been doing well here  Likes to use figit toys     Sacral decubitus ulcer, stage II ([facility] )  Assessment & Plan  · Continue local wound care    Anemia  Assessment & Plan  Relatively chronic with baseline hemoglobin approximately 9-11  · Hgb stable as of last check          VTE Pharmacologic Prophylaxis: VTE Score: 10 High Risk (Score >/= 5) - Pharmacological DVT Prophylaxis Ordered: enoxaparin (Lovenox)  Sequential Compression Devices Ordered  Patient Centered Rounds: I performed bedside rounds with nursing staff today    Discussions with Specialists or Other Care Team Provider: case management    Education and Discussions with Family / Patient: Updated  (wife) via phone  Time Spent for Care: 30 minutes  More than 50% of total time spent on counseling and coordination of care as described above  Current Length of Stay: 11 day(s)  Current Patient Status: Inpatient   Certification Statement: The patient will continue to require additional inpatient hospital stay due to placement  Discharge Plan: Anticipate discharge tomorrow to rehab facility  Code Status: Level 3 - DNAR and DNI    Subjective:   No events per nursing    Objective:     Vitals:   Temp (24hrs), Av 1 °F (36 7 °C), Min:97 3 °F (36 3 °C), Max:99 °F (37 2 °C)    Temp:  [97 3 °F (36 3 °C)-99 °F (37 2 °C)] 97 3 °F (36 3 °C)  HR:  [66-85] 69  Resp:  [16] 16  BP: (125-136)/(64-80) 125/68  SpO2:  [96 %-100 %] 100 %  There is no height or weight on file to calculate BMI  Input and Output Summary (last 24 hours): Intake/Output Summary (Last 24 hours) at 2022 1048  Last data filed at 2022 1420  Gross per 24 hour   Intake 240 ml   Output --   Net 240 ml       Physical Exam:   Physical Exam  Vitals and nursing note reviewed  Constitutional:       Appearance: He is well-developed  HENT:      Head: Normocephalic and atraumatic  Eyes:      Conjunctiva/sclera: Conjunctivae normal    Cardiovascular:      Rate and Rhythm: Normal rate and regular rhythm  Heart sounds: No murmur heard  Pulmonary:      Effort: Pulmonary effort is normal  No respiratory distress  Breath sounds: Normal breath sounds  Abdominal:      Palpations: Abdomen is soft  Tenderness: There is no abdominal tenderness  Musculoskeletal:      Cervical back: Neck supple  Skin:     General: Skin is warm and dry  Neurological:      Mental Status: He is alert  Mental status is at baseline        Comments: Non verbal   Psychiatric:         Cognition and Memory: Cognition is impaired  Judgment: Judgment is impulsive  Additional Data:     Labs:  Results from last 7 days   Lab Units 01/09/22  0442   WBC Thousand/uL 7 06   HEMOGLOBIN g/dL 10 0*   HEMATOCRIT % 31 4*   PLATELETS Thousands/uL 283   NEUTROS PCT % 68   LYMPHS PCT % 21   MONOS PCT % 8   EOS PCT % 2     Results from last 7 days   Lab Units 01/09/22  0442   SODIUM mmol/L 140   POTASSIUM mmol/L 3 8   CHLORIDE mmol/L 110*   CO2 mmol/L 22   BUN mg/dL 17   CREATININE mg/dL 0 54*   ANION GAP mmol/L 8   CALCIUM mg/dL 9 1   GLUCOSE RANDOM mg/dL 81                       Lines/Drains:  Invasive Devices  Report    None                       Imaging: No pertinent imaging reviewed  Recent Cultures (last 7 days):         Last 24 Hours Medication List:   Current Facility-Administered Medications   Medication Dose Route Frequency Provider Last Rate    acetaminophen  975 mg Oral [facility] Rodrigo Torres MD      busPIRone  5 mg Oral BID Rodrigo Torres MD      donepezil  10 mg Oral Daily Rodrigo Torres MD      enoxaparin  40 mg Subcutaneous Daily Rodrigo Torres MD      escitalopram  10 mg Oral Daily Rodrigo Torres MD      fluticasone  1 spray Nasal Daily Rodrigo Torres MD      lidocaine  1 patch Topical Daily Rodrigo Torres MD      ondansetron  4 mg Intravenous Q6H PRN Rodrigo Torres MD      oxyCODONE  2 5 mg Oral Q4H PRN Jarek Tijerina PA-C      oxyCODONE  5 mg Oral Q4H PRN Jarek Tijerina PA-C      pravastatin  40 mg Oral Daily With Gael Clemente MD      QUEtiapine  25 mg Oral HS Rodrigo Torres MD      senna-docusate sodium  1 tablet Oral HS Rodrigo Torres MD      tamsulosin  0 4 mg Oral Daily Rodrigo Torres MD          Today, Patient Was Seen By: Jarek Tijerina PA-C    **Please Note: This note may have been constructed using a voice recognition system  **

## 2022-01-13 NOTE — WOUND OSTOMY CARE
Progress Note - Wound   Dorsie Crews 67 y o  male MRN: [id number]  Unit/Bed#: [facility] 602-01 Encounter: [id number]        Assessment:   Patient seen for weekly wound care follow-up  He is sitting in recliner on offloading air cushion  Incontinent of bowel and bladder  Requires maximum assist x 2 to turn, agitated, grabbing/swinging at staff during assessment  States he is "scared "  Emotional support provided  Bilateral heels intact and blanchable  1   Sacral wound with complex etiology of pressure and moisture damage--yellow slough has resolved  Small maroon, non-blanchable area to right sacrum remains  Remainder of sacrum with blanchable erythema, scaly, dry, flaky  See flowsheet for wound details  Wound Care Plan:   1-Apply Allevyn Life foam dressing to bilateral heel for prevention  Marco Antonio with P   Peel back at least daily for skin assessment and re-apply  Change dressing every 3 days and PRN  2-Elevate heels off of bed/chair surface to offload pressure  3-Offloading air cushion in chair when out of bed  4-Moisturize skin daily with skin nourishing cream   5-Turn/reposition every 2 hours while in bed and weight shift frequently while in chair for pressure re-distribution on skin  6-Sacrum/buttocks--cleanse with soap and water, pat dry  Apply thin layer of Calazime paste three times daily and as needed with incontinence care  Wound care team to follow        Alise MARCUM, RN, [facility]

## 2022-01-13 NOTE — PLAN OF CARE
Problem: PHYSICAL THERAPY ADULT  Goal: Performs mobility at highest level of function for planned discharge setting  See evaluation for individualized goals  Description: Treatment/Interventions: Functional transfer training,LE strengthening/ROM,Therapeutic exercise,Endurance training,Patient/family training,Equipment eval/education,Bed mobility,Continued evaluation,Spoke to nursing,OT  Equipment Recommended:  (continue to assess )       See flowsheet documentation for full assessment, interventions and recommendations  Outcome: Progressing  Note: Prognosis: Guarded  Problem List: Decreased strength,Decreased range of motion,Decreased endurance,Impaired balance,Decreased mobility,Decreased cognition,Impaired judgement,Decreased safety awareness,Pain  Assessment: Pt continues to require Ax2 for all tasks including bed mobiilty & OOB trnasfers as noted above  Pt initially physically resisting all movements, but once sitting EOB, was able to maintain sitting balance with much less assist   Was then able to perform sit pviot transfer with Ax1 initially, then x2 to transition low to high seat height  Pt able to participate in task with extended time given to understand command & initiate movement  Pt Initially required total assist for trunk control upon sitting, but maintained balance with Ax1 during pivot, and sat upright inchair at conclusoin of session, appearing more comfortable then when session began  RN informed of pt mobility status, position & possible need for pain meds to alleviate discomfort & ease burden of caregivers when he returns back to bed  POC and discharge plan remain appropriate at this time  Barriers to Discharge: Inaccessible home environment,Decreased caregiver support        PT Discharge Recommendation: Post acute rehabilitation services          See flowsheet documentation for full assessment

## 2022-01-13 NOTE — OCCUPATIONAL THERAPY NOTE
Occupational Therapy Treatment Note:       01/13/22 1003   OT Last Visit   OT Visit Date 01/13/22   Note Type   Note Type Treatment   Restrictions/Precautions   LLE Weight Bearing Per Order WBAT   Pain Assessment   Pain Assessment Tool FLACC   Pain Rating: FLACC (Rest) - Face 0   Pain Rating: FLACC (Rest) - Legs 0   Pain Rating: FLACC (Rest) - Activity 0   Pain Rating: FLACC (Rest) - Cry 0   Pain Rating: FLACC (Rest) - Consolability 0   Score: FLACC (Rest) 0   Pain Rating: FLACC (Activity) - Face 1   Pain Rating: FLACC (Activity) - Legs 1   Pain Rating: FLACC (Activity) - Activity 0   Pain Rating: FLACC (Activity) - Cry 1   Pain Rating: FLACC (Activity) - Consolability 0   Score: FLACC (Activity) 3   ADL   Where Assessed Chair   Eating Assistance 4  Minimal Assistance   Eating Comments when handed a banana pt was able to peel and eat entire fruit without assistance  when handed bowl of dry cherrios he fed self and then he ate Hungarian toast cut into quarter  pt is able to drink oj and coffee from straw when placed into hand   pt is notted to    [address] 2  Maximal Assistance   UB Dressing Comments difficulty following multimodal cues to push arms through sleeves   LB Dressing Assistance 1  Total Assistance   Toileting Assistance  1  Total Assistance   Toileting Comments pt requires asst x 2 to roll inorder to clean large incontinence of bladder   Bed Mobility   Rolling R 1  Dependent   Rolling L 1  Dependent   Supine to Sit 1  Dependent   Additional items   (ax2)   Transfers   Sit pivot 3  Moderate assistance   Additional items Assist x 2   Cognition   Overall Cognitive Status Impaired   Arousal/Participation Alert   Attention Difficulty attending to directions   Orientation Level Disoriented X4   Memory Decreased short term memory;Decreased recall of recent events;Decreased recall of precautions   Following Commands Follows one step commands inconsistently   Comments when food items were placed into hand pt self initated feeding himself including peeling banana  pt with difficulty following most commands   Activity Tolerance   Activity Tolerance Patient tolerated treatment well   Assessment   Assessment pt participated in am ot session and was seen focusing on direction following attempts, self feeding and sit pivot style transfers bed to chair  pt was more sleepy this session but did wake and engaged in session  when in chair pt was more alert and fed self breakfast with min asst using simple finger foods pt withnoted improvement with sit pivot style transfer  currently however  post session was positioned towards window in reclined chair with legs up while on chair alarm   Plan   Treatment Interventions ADL retraining;Functional transfer training; Endurance training;Patient/family training;Equipment evaluation/education; Activityengagement   Goal Expiration Date 01/13/22   OT Treatment Day 3   OT Frequency 2-3x/wk   Recommendation   OT Discharge Recommendation Post acute rehabilitation services   OT - OK to Discharge Yes   AM-PAC Daily Activity Inpatient   Lower Body Dressing 1   Bathing 2   Toileting 2   Upper Body Dressing 2   Grooming 2   Eating 2   Daily Activity Raw Score 11   Daily Activity Standardized Score (Calc for Raw Score >=11) 29 04   AM-PAC Applied Cognition Inpatient   Following a Speech/Presentation 1   Understanding Ordinary Conversation 2   Taking Medications 1   Remembering Where Things Are Placed or Put Away 1   Remembering List of 4-5 Errands 1   Taking Care of Complicated Tasks 1   Applied Cognition Raw Score 7   Applied Cognition Standardized Score 15 17 April A Shyam Moreira

## 2022-01-13 NOTE — CASE MANAGEMENT
Case Management Discharge Planning Note    Patient name Penelope Mcdowell  Location [address]/PPHP 833-42 MRN [id number]  : 1945 Date 2022       Current Admission Date: 2022  Current Admission Diagnosis:Closed fracture of neck of left femur [facility])   Patient Active Problem List    Diagnosis Date Noted    Frailty syndrome in geriatric patient 2022    Impaired vision 2022    High risk of developing delirium  2022    Closed fracture of neck of left femur ([address] ) 2022    Ambulatory dysfunction 2022    Moderate protein-calorie malnutrition ([facility] ) 2021    Sacral decubitus ulcer, stage II ([facility] ) 2021    Encephalopathy 2021    Fall 2021    Closed right hip fracture ([facility] ) 2021    Dysphagia 2021    S/P total knee replacement, right 2021    Anemia 2021    Alzheimer's disease ([facility] ) 2020    Memory impairment 2020    Hypercholesterolemia 2020    Benign prostatic hyperplasia without lower urinary tract symptoms 2020    Essential hypertension 2020    Elevated fasting glucose 2020      LOS (days): 11  Geometric Mean LOS (GMLOS) (days): 4 30  Days to GMLOS:-6 4     OBJECTIVE:  Risk of Unplanned Readmission Score: 22         Current admission status: Inpatient   Preferred Pharmacy:   [id number] [place], [address] [place] [address]  Phone: [phone] Fax: [phone]    Primary Care Provider: Sapna Villalba    Primary Insurance: MEDICARE  Secondary Insurance: [address] [place] [address] Dr:    Discharge planning discussed with[de-identified] Wife Clairehaile Víctor of Choice: Yes    Contacts  Patient Contacts: Noa Arce  Relationship to Patient[de-identified] Family  Contact Method: Phone  Phone Number: [phone]  Reason/Outcome: Continuity of [address]         Is the patient interested in [facility] at discharge?: No    DME Referral Provided  Referral made for DME?: No    Other Referral/Resources/Interventions Provided:  Interventions: Short Term Rehab,SNF  Referral Comments: wife accepting of bed on 1/14 at [address] [facility] Recommendation: [id number]  Discharge Destination Plan[de-identified] [address] [facility] at Discharge : [facility]  Dispatcher Contacted: Yes  Number/Name of Dispatcher: Fariba Kaisert and Unit #):  DARREL  ETA of Transport (Date): 01/13/22  ETA of Transport (Time): Kelli Diallo 5 Name, Rajiv Keyannajl Eaton [address] [place] [address]  Receiving Facility/Agency Phone Number: [phone] ext [phone]  Facility/Agency Fax Number: [phone]

## 2022-01-13 NOTE — PROGRESS NOTES
Patient remains hospitalized at time of this chart review, LOS = 11d  Patient medically stable, pending discharge to SNF  Family requested [facility] for STR however patient was recommended for slower paced therapy, therefore not accepted to [facility]  Referral placed by IP CM for to [facility]  Spoke with patient's wife Soumya Hernandez who informed CM that patient will transfer to [facility] on [address]  Soumya Hernandez states she has been in contact with [facility] who does not have any bed availability at this and patient remains on list for LTC bed  Wife informed of New embedded CM, Denis Stewart, for PCP office and future outreaches will be conducted by her  Wife verbalized understanding and was provided with Megan's call back number as requested  Embedded CM added to care team for additional follow up  Email sent with hand off information

## 2022-01-13 NOTE — PHYSICAL THERAPY NOTE
Physical Therapy Progress Note     01/13/22 0958   PT Last Visit   PT Visit Date 01/13/22   Note Type   Note Type Treatment   Pain Assessment   Pain Assessment Tool FLACC   Pain Rating: FLACC (Rest) - Face 2   Pain Rating: FLACC (Rest) - Legs 1   Pain Rating: FLACC (Rest) - Activity 1   Pain Rating: FLACC (Rest) - Cry 1   Pain Rating: FLACC (Rest) - Consolability 0   Score: FLACC (Rest) 5   Pain Rating: FLACC (Activity) - Face 2   Pain Rating: FLACC (Activity) - Legs 2   Pain Rating: FLACC (Activity) - Activity 2   Pain Rating: FLACC (Activity) - Cry 1   Pain Rating: FLACC (Activity) - Consolability 1   Score: FLACC (Activity) 8   Restrictions/Precautions   LLE Weight Bearing Per Order WBAT   Other Precautions Cognitive; Chair Alarm; Bed Alarm;WBS;Pain; Fall Risk   Subjective   Subjective Pt encountered supine in bed, appeared fatigued, and PCA reported limited ability to eat breakfast at that time  Pt demosntrated signs of pain at rest, especially after activity, and occasionally grabbing for staff, but not swinging today  Appeared more comfortable sitting upright & in chair afterwards, feeding himself breakfast when it was handed to him by HOLT  Bed Mobility   Rolling R 1  Dependent   Rolling L 1  Dependent   Supine to Sit 2  Maximal assistance   Additional items Assist x 2   Sit to Supine 2  Maximal assistance   Additional items Assist x 2   Transfers   Sit pivot 3  Moderate assistance   Additional items Assist x 2   Balance   Static Sitting Poor   Dynamic Sitting Poor   Endurance Deficit   Endurance Deficit Yes   Endurance Deficit Description pain, fatigue   Activity Tolerance   Activity Tolerance Patient tolerated treatment well;Patient limited by fatigue;Patient limited by pain   Nurse [address], RN   Assessment   Prognosis Guarded   Problem List Decreased strength;Decreased range of motion;Decreased endurance; Impaired balance;Decreased mobility; Decreased cognition; Impaired judgement;Decreased safety awareness;Pain   Assessment Pt continues to require Ax2 for all tasks including bed mobiilty & OOB trnasfers as noted above  Pt initially physically resisting all movements, but once sitting EOB, was able to maintain sitting balance with much less assist   Was then able to perform sit pviot transfer with Ax1 initially, then x2 to transition low to high seat height  Pt able to participate in task with extended time given to understand command & initiate movement  Pt Initially required total assist for trunk control upon sitting, but maintained balance with Ax1 during pivot, and sat upright inchair at conclusoin of session, appearing more comfortable then when session began  RN informed of pt mobility status, position & possible need for pain meds to alleviate discomfort & ease burden of caregivers when he returns back to bed  POC and discharge plan remain appropriate at this time  Goals   Patient Goals none stated due to cognitive impairments   STG Expiration Date 01/15/22   PT Treatment Day 2   Plan   Treatment/Interventions Functional transfer training;LE strengthening/ROM; Therapeutic exercise; Endurance training;Cognitive reorientation;Patient/family training;Equipment eval/education; Bed mobility   Progress Slow progress, cognitive deficits   PT Frequency 2-3x/wk   Recommendation   PT Discharge Recommendation Post acute rehabilitation services   Equipment Recommended   (continue to assess)   AM-PAC Basic Mobility Inpatient   Turning in Bed Without Bedrails 1   Lying on Back to Sitting on Edge of Flat Bed 1   Moving Bed to Chair 1   Standing Up From Chair 1   Walk in Room 1   Climb 3-5 Stairs 1   Basic Mobility Inpatient Raw Score 6   Turning Head Towards Sound 4   Follow Simple Instructions 3   Low Function Basic Mobility Raw Score 13   Low Function Basic Mobility Standardized Score 20 14   Highest Level Of Mobility   [facility] Goal 2: Bed activities/Dependent transfer   The patient's AM-PAC Basic Mobility Inpatient Short Form Raw Score is 6  A Raw score of less than or equal to 16 suggests the patient may benefit from discharge to post-acute rehabilitation services  Please also refer to the recommendation of the Physical Therapist for safe discharge planning            Noa Hurt PTA

## 2022-01-13 NOTE — CASE MANAGEMENT
Case Management Progress Note    Patient name Maye Click  [address] [id number]  : 1945 Date 2022       LOS (days): 11  Geometric Mean LOS (GMLOS) (days): 4 30  Days to GMLOS:-6 6        OBJECTIVE:        Current admission status: Inpatient  Preferred Pharmacy:   [id number]- [address] [address]  Phone: [phone] Fax: [phone]    Primary Care Provider: HORACIO Armstrong    Primary Insurance: MEDICARE  Secondary Insurance: [employer]    PROGRESS NOTE: TC from [facility] [address], able to accommodate patient as early admission on Saturday AM   TC to wife, Hank Sepulveda, who is agreeable with DCP

## 2022-01-13 NOTE — PLAN OF CARE
Problem: OCCUPATIONAL THERAPY ADULT  Goal: Performs self-care activities at highest level of function for planned discharge setting  See evaluation for individualized goals  Description: Treatment Interventions: ADL retraining,Functional transfer training,Endurance training,Cognitive reorientation,Patient/family training,Equipment evaluation/education,Compensatory technique education,Energy conservation,Activityengagement          See flowsheet documentation for full assessment, interventions and recommendations  1/13/2022 1327 by YANET Hollins  Outcome: Progressing  Note: Limitation: Decreased ADL status,Decreased Safe judgement during ADL,Decreased cognition,Decreased endurance,Decreased high-level ADLs,Decreased self-care trans  Prognosis: Fair,Guarded  Assessment: pt participated in am ot session and was seen focusing on direction following attempts, self feeding and sit pivot style transfers bed to chair  pt was more sleepy this session but did wake and engaged in session  when in chair pt was more alert and fed self breakfast with min asst using simple finger foods pt withnoted improvement with sit pivot style transfer  currently however  post session was positioned towards window in reclined chair with legs up while on chair alarm  Recommendation: Geriatric Consult (PT 2014 [facility] )  OT Discharge Recommendation: Post acute rehabilitation services  OT - OK to Discharge: Yes    1/13/2022 1325 by YANET Hollins  Outcome: Progressing  Note: Limitation: Decreased ADL status,Decreased Safe judgement during ADL,Decreased cognition,Decreased endurance,Decreased high-level ADLs,Decreased self-care trans  Prognosis: Fair,Guarded  Assessment: pt participated in am ot session and was seen focusing on direction following attempts, self feeding and sit pivot style transfers bed to chair  pt was more sleepy this session but did wake and engaged in session   when in chair pt was more alert and fed self breakfast with min asst using simple finger foods pt withnoted improvement with sit pivot style transfer  currently however  post session was positioned towards window in reclined chair with legs up while on chair alarm  Recommendation: Geriatric Consult (PT [facility] )  OT Discharge Recommendation: Post acute rehabilitation services  OT - OK to Discharge:  Yes

## 2022-01-13 NOTE — PLAN OF CARE
Problem: OCCUPATIONAL THERAPY ADULT  Goal: Performs self-care activities at highest level of function for planned discharge setting  See evaluation for individualized goals  Description: Treatment Interventions: ADL retraining,Functional transfer training,Endurance training,Cognitive reorientation,Patient/family training,Equipment evaluation/education,Compensatory technique education,Energy conservation,Activityengagement          See flowsheet documentation for full assessment, interventions and recommendations  Outcome: Progressing  Note: Limitation: Decreased ADL status,Decreased Safe judgement during ADL,Decreased cognition,Decreased endurance,Decreased high-level ADLs,Decreased self-care trans  Prognosis: Fair,Guarded  Assessment: pt participated in am ot session and was seen focusing on direction following attempts, self feeding and sit pivot style transfers bed to chair  pt was more sleepy this session but did wake and engaged in session  when in chair pt was more alert and fed self breakfast with min asst using simple finger foods pt withnoted improvement with sit pivot style transfer  currently however  post session was positioned towards window in reclined chair with legs up while on chair alarm  Recommendation: Geriatric Consult (PT [address] [facility] )  OT Discharge Recommendation: Post acute rehabilitation services  OT - OK to Discharge:  Yes  YANET Hollins

## 2022-01-14 PROBLEM — U07.1 COVID: Status: ACTIVE | Noted: 2022-01-14

## 2022-01-14 PROCEDURE — 99232 SBSQ HOSP IP/OBS MODERATE 35: CPT | Performed by: PHYSICIAN ASSISTANT

## 2022-01-14 PROCEDURE — 99232 SBSQ HOSP IP/OBS MODERATE 35: CPT | Performed by: INTERNAL MEDICINE

## 2022-01-14 RX ADMIN — ENOXAPARIN SODIUM 40 MG: 40 INJECTION SUBCUTANEOUS at 08:31

## 2022-01-14 RX ADMIN — TAMSULOSIN HYDROCHLORIDE 0.4 MG: 0.4 CAPSULE ORAL at 08:30

## 2022-01-14 RX ADMIN — ACETAMINOPHEN 975 MG: 325 TABLET, FILM COATED ORAL at 14:19

## 2022-01-14 RX ADMIN — PRAVASTATIN SODIUM 40 MG: 40 TABLET ORAL at 18:12

## 2022-01-14 RX ADMIN — ACETAMINOPHEN 975 MG: 325 TABLET, FILM COATED ORAL at 21:19

## 2022-01-14 RX ADMIN — LIDOCAINE 5% 1 PATCH: 700 PATCH TOPICAL at 08:31

## 2022-01-14 RX ADMIN — QUETIAPINE FUMARATE 25 MG: 25 TABLET ORAL at 21:19

## 2022-01-14 RX ADMIN — ESCITALOPRAM OXALATE 10 MG: 10 TABLET ORAL at 08:30

## 2022-01-14 RX ADMIN — DONEPEZIL HYDROCHLORIDE 10 MG: 10 TABLET ORAL at 08:30

## 2022-01-14 RX ADMIN — OXYCODONE HYDROCHLORIDE 5 MG: 5 TABLET ORAL at 21:19

## 2022-01-14 RX ADMIN — BUSPIRONE HYDROCHLORIDE 5 MG: 5 TABLET ORAL at 21:19

## 2022-01-14 RX ADMIN — BUSPIRONE HYDROCHLORIDE 5 MG: 5 TABLET ORAL at 08:30

## 2022-01-14 RX ADMIN — ACETAMINOPHEN 975 MG: 325 TABLET, FILM COATED ORAL at 06:15

## 2022-01-14 NOTE — PLAN OF CARE
Problem: PAIN - ADULT  Goal: Verbalizes/displays adequate comfort level or baseline comfort level  Description: Interventions:  - Encourage patient to monitor pain and request assistance  - Assess pain using appropriate pain scale  - Administer analgesics based on type and severity of pain and evaluate response  - Implement non-pharmacological measures as appropriate and evaluate response  - Consider cultural and social influences on pain and pain management  - Notify physician/advanced practitioner if interventions unsuccessful or patient reports new pain  Outcome: Progressing     Problem: INFECTION - ADULT  Goal: Absence or prevention of progression during hospitalization  Description: INTERVENTIONS:  - Assess and monitor for signs and symptoms of infection  - Monitor lab/diagnostic results  - Monitor all insertion sites, i e  indwelling lines, tubes, and drains  - Monitor endotracheal if appropriate and nasal secretions for changes in amount and color  - [place] appropriate cooling/warming therapies per order  - Administer medications as ordered  - Instruct and encourage patient and family to use good hand hygiene technique  - Identify and instruct in appropriate isolation precautions for identified infection/condition  Outcome: Progressing     Problem: SAFETY ADULT  Goal: Patient will remain free of falls  Description: INTERVENTIONS:  - Educate patient/family on patient safety including physical limitations  - Instruct patient to call for assistance with activity   - Consult OT/PT to assist with strengthening/mobility   - Keep Call bell within reach  - Keep bed low and locked with side rails adjusted as appropriate  - Keep care items and personal belongings within reach  - Initiate and maintain comfort rounds  - Make Fall Risk Sign visible to staff  - Offer Toileting every 2 Hours, in advance of need  - Initiate/Maintain bed alarm  - Obtain necessary fall risk management equipment:   - Apply yellow socks and bracelet for high fall risk patients  - Consider moving patient to room near nurses station  Outcome: Progressing  Goal: Maintain or return to baseline ADL function  Description: INTERVENTIONS:  -  Assess patient's ability to carry out ADLs; assess patient's baseline for ADL function and identify physical deficits which impact ability to perform ADLs (bathing, care of mouth/teeth, toileting, grooming, dressing, etc )  - Assess/evaluate cause of self-care deficits   - Assess range of motion  - Assess patient's mobility; develop plan if impaired  - Assess patient's need for assistive devices and provide as appropriate  - Encourage maximum independence but intervene and supervise when necessary  - Involve family in performance of ADLs  - Assess for home care needs following discharge   - Consider OT consult to assist with ADL evaluation and planning for discharge  - Provide patient education as appropriate  Outcome: Progressing  Goal: Maintains/Returns to pre admission functional level  Description: INTERVENTIONS:  - Perform BMAT or MOVE assessment daily    - Set and communicate daily mobility goal to care team and patient/family/caregiver  - Collaborate with rehabilitation services on mobility goals if consulted  - Reposition patient every 2 hours    - Out of bed for toileting  - Record patient progress and toleration of activity level   Outcome: Progressing     Problem: DISCHARGE PLANNING  Goal: Discharge to home or other facility with appropriate resources  Description: INTERVENTIONS:  - Identify barriers to discharge w/patient and caregiver  - Arrange for needed discharge resources and transportation as appropriate  - Identify discharge learning needs (meds, wound care, etc )  - Arrange for interpretive services to assist at discharge as needed  - Refer to Case Management Department for coordinating discharge planning if the patient needs post-hospital services based on physician/advanced practitioner order or complex needs related to functional status, cognitive ability, or social support system  Outcome: Progressing     Problem: Knowledge Deficit  Goal: Patient/family/caregiver demonstrates understanding of disease process, treatment plan, medications, and discharge instructions  Description: Complete learning assessment and assess knowledge base    Interventions:  - Provide teaching at level of understanding  - Provide teaching via preferred learning methods  Outcome: Progressing     Problem: Potential for Falls  Goal: Patient will remain free of falls  Description: INTERVENTIONS:  - Educate patient/family on patient safety including physical limitations  - Instruct patient to call for assistance with activity   - Consult OT/PT to assist with strengthening/mobility   - Keep Call bell within reach  - Keep bed low and locked with side rails adjusted as appropriate  - Keep care items and personal belongings within reach  - Initiate and maintain comfort rounds  - Make Fall Risk Sign visible to staff  - Offer Toileting every 2 Hours, in advance of need  - Initiate/Maintain bed alarm  - Obtain necessary fall risk management equipment:   - Apply yellow socks and bracelet for high fall risk patients  - Consider moving patient to room near nurses station  Outcome: Progressing     Problem: Prexisting or High Potential for Compromised Skin Integrity  Goal: Skin integrity is maintained or improved  Description: INTERVENTIONS:  - Identify patients at risk for skin breakdown  - Assess and monitor skin integrity  - Assess and monitor nutrition and hydration status  - Monitor labs   - Assess for incontinence   - Turn and reposition patient  - Assist with mobility/ambulation  - Relieve pressure over bony prominences  - Avoid friction and shearing  - Provide appropriate hygiene as needed including keeping skin clean and dry  - Evaluate need for skin moisturizer/barrier cream  - Collaborate with interdisciplinary team   - Patient/family teaching  - Consider wound care consult   Outcome: Progressing     Problem: MOBILITY - ADULT  Goal: Maintain or return to baseline ADL function  Description: INTERVENTIONS:  -  Assess patient's ability to carry out ADLs; assess patient's baseline for ADL function and identify physical deficits which impact ability to perform ADLs (bathing, care of mouth/teeth, toileting, grooming, dressing, etc )  - Assess/evaluate cause of self-care deficits   - Assess range of motion  - Assess patient's mobility; develop plan if impaired  - Assess patient's need for assistive devices and provide as appropriate  - Encourage maximum independence but intervene and supervise when necessary  - Involve family in performance of ADLs  - Assess for home care needs following discharge   - Consider OT consult to assist with ADL evaluation and planning for discharge  - Provide patient education as appropriate  Outcome: Progressing  Goal: Maintains/Returns to pre admission functional level  Description: INTERVENTIONS:  - Perform BMAT or MOVE assessment daily    - Set and communicate daily mobility goal to care team and patient/family/caregiver  - Collaborate with rehabilitation services on mobility goals if consulted  - Reposition patient every 2 hours    - Out of bed for toileting  - Record patient progress and toleration of activity level   Outcome: Progressing

## 2022-01-14 NOTE — ASSESSMENT & PLAN NOTE
-incidental finding on screening for STR  -continues to saturate well on room air  -remains on isolation per protocol, appropriate PPE utilized for duration of encounter

## 2022-01-14 NOTE — CASE MANAGEMENT
Case Management Discharge Planning Note    Patient name Bunny [facility]  Location [address] [place] [address] 630/[facility]HP 552-59 MRN [id number]  : 1945 Date 2022       Current Admission Date: 2022  Current Admission Diagnosis:Closed fracture of neck of left femur [facility])   Patient Active Problem List    Diagnosis Date Noted    Frailty syndrome in geriatric patient 2022    Impaired vision 2022    High risk of developing delirium  2022    Closed fracture of neck of left femur ([address] ) 2022    Ambulatory dysfunction 2022    Moderate protein-calorie malnutrition ([address] ) 2021    Sacral decubitus ulcer, stage II ([address] ) 2021    Encephalopathy 2021    Fall 2021    Closed right hip fracture ([address] ) 2021    Dysphagia 2021    S/P total knee replacement, right 2021    Anemia 2021    Alzheimer's disease ([address] ) 2020    Memory impairment 2020    Hypercholesterolemia 2020    Benign prostatic hyperplasia without lower urinary tract symptoms 2020    Essential hypertension 2020    Elevated fasting glucose 2020      LOS (days): 12  Geometric Mean LOS (GMLOS) (days): 4 30  Days to GMLOS:-7 4     OBJECTIVE:  Risk of Unplanned Readmission Score: 22         Current admission status: Inpatient   Preferred Pharmacy:   [id number] [address] [place] [address]  Phone: [phone] Fax: [phone]    Primary Care Provider: HORACIO Adams    Primary Insurance: MEDICARE  Secondary Insurance: [facility] DETAILS:    Discharge planning discussed with[de-identified] Wife Amrita Marrufo of Choice: Yes     CM contacted family/caregiver?: Yes  Were Treatment Team discharge recommendations reviewed with patient/caregiver?: Yes  Did patient/caregiver verbalize understanding of patient care needs?: Yes  Were patient/caregiver advised of the risks associated with not following Treatment Team discharge recommendations?: Yes    Contacts  Patient Contacts: Sharon Zepeda  Relationship to Patient[de-identified] Family  Contact Method: Phone  Phone Number: [phone]  Reason/Outcome: Continuity of [address]         Is the patient interested in [place] [address] at discharge?: No    DME Referral Provided  Referral made for DME?: No    Other Referral/Resources/Interventions Provided:  Interventions: Short Term Rehab,SNF  Referral Comments: Per Behzad Yoo CM able to accept patient on 1/15  Transportation [facility] in [facility]          IMM Given (Date):: 01/14/22  IMM Given to[de-identified] [place] Name, Rajiv Hernandezfatou Eaton 1948 PA  Receiving Facility/Agency Phone Number: [phone] ext [phone]  Facility/Agency Fax Number: [phone]

## 2022-01-14 NOTE — PROGRESS NOTES
Progress Note - Geriatric Medicine   Maye Kerr 67 y o  male MRN: [id number]  Unit/Bed#: [facility] 630-01 Encounter: [id number]      Assessment/Plan:    COVID  Assessment & Plan  -incidental finding on screening for STR  -continues to saturate well on room air  -remains on isolation per protocol, appropriate PPE utilized for duration of encounter    Alzheimer's disease ([facility] )  Assessment & Plan  -severe and progressive, remains dependent for all cares  -home regimen includes Aricept and Seroquel - Buspar added earlier in admission with notable benefit, continue current regimen   -continue distraction techniques roney with changing and repositioning to reduce stressors to patient   -remains off soft restraints and has been doing extremely well with redirection  -maintain calm and quiet environment reduce risk overstimulation    High risk of developing delirium   Assessment & Plan  -multifactorial including history of encephalopathy during previous hospitalizations  -continue home donepezil and Seroquel regimen, if needed in short-term good consider low-dose Zyprexa 2 5mg Q8H PRN agitation unable to be redirected with conservative measures, please ensure pain and all other physiologic needs met and addressed before consideration of administration of medication for agitation as they are frequent precipitants of encephalopathy/agitation in patient sore unable to clearly communicate these needs due to underlying dementia/cognitive impairment    Ambulatory dysfunction with fall  Assessment & Plan  -reportedly mechanical fall at home 1/2/22  -head strike reported, unknown loss of conscious  -injuries as outlined below  -primarily wheelchair-bound at baseline and remains high risk recurrent falls in future due to deconditioning debility, poor safety awareness, impulsivity and underlying dementia  -continue fall precautions  -PT and OT following, appreciate input    * Closed fracture of neck of left femur ([facility] )  Assessment & Plan  -s/p ORIF on 1/4/22  -continue neurovascular checks per protocol  -no acute blood loss anemia  -appears comfortable at rest, consider premedicating with Tylenol before cares in the acute setting to ensure any underlying pain or shortness is treated    Impaired vision  Assessment & Plan  -requires use of corrective lenses, continue use at all appropriate times  -consider large font for printed materials provided to patient    Frailty syndrome in geriatric patient  Assessment & Plan  -clinical frailty scale stage 7/8, severely frail  -multifactorial including advanced dementia, age and multitude of chronic medical co-morbidities  -continue optimization of nutritional intake and chronic conditions as possible  -continue to ensure that treatments and interventions are in line with patient's and family's wishes and goals of care    Care coordination: rounded with Fany (RN)    Subjective:     Patient seen examined at bedside where he is lying resting, at time evaluation he is resting comfortably, nursing reports that he was restless for short time overnight which has since resolved  Review of Systems   Unable to perform ROS: Dementia     Objective:     Vitals: Blood pressure 129/74, pulse 65, temperature (!) 95 9 °F (35 5 °C), temperature source Tympanic, resp  rate 16, SpO2 95 %  ,There is no height or weight on file to calculate BMI  No intake or output data in the 24 hours ending 01/14/22 0945    Current Medications: Reviewed    Physical Exam:   Physical Exam  Vitals and nursing note reviewed  Constitutional:       General: He is not in acute distress  Comments: Thin, frail, elderly appearing male   HENT:      Head: Normocephalic  Nose: Nose normal       Mouth/Throat:      Mouth: Mucous membranes are dry  Comments: Dentition intact  Eyes:      General: No scleral icterus  Right eye: No discharge  Left eye: No discharge        Conjunctiva/sclera: Conjunctivae normal    Neck: Comments: Trachea midline  Cardiovascular:      Rate and Rhythm: Normal rate and regular rhythm  Pulmonary:      Effort: Pulmonary effort is normal  No respiratory distress  Abdominal:      General: Bowel sounds are normal  There is no distension  Palpations: Abdomen is soft  Tenderness: There is no abdominal tenderness  Musculoskeletal:      Cervical back: Neck supple  Right lower leg: No edema  Left lower leg: No edema  Comments: Diffuse subcutaneous fat and muscle wasting   Skin:     General: Skin is warm and dry  Neurological:      Mental Status: He is alert  Comments: Awake and alert, mostly nonverbal, does not follow commands   Psychiatric:      Comments: Inattentive         Invasive Devices  Report    None               Lab, Imaging and other studies: I have personally reviewed pertinent reports

## 2022-01-14 NOTE — PROGRESS NOTES
[facility]  Progress Note Kailey Massing 1945, 67 y o  male MRN: [id number]  Unit/Bed#: [facility] 630-01 Encounter: [id number]  Primary Care Provider: HORACIO Castillo   Date and time admitted to hospital: 1/2/2022  2:07 PM    * Closed fracture of neck of left femur [facility])  Assessment & Plan  Secondary to mechanical fall   · Ortho following  · S/p left hip cannulated screw fixation on 1/4  · Continue pain control with scheduled Tylenol, Lidoderm patch, prn low dose oxycodone   · Continue with DVT ppx   · PT/OT recommending rehab, has been medically stable, CM following    COVID  Assessment & Plan  · Positive 1/13 on screening test for SNF  · asymptomatic     Ambulatory dysfunction  Assessment & Plan  Previous history of right hip fracture status post ORIF on 11/5/2021 and now with left femoral fracture as above   · Fall precautions, safe ambulation  · PT/OT, needs rehab     Alzheimer's disease ([facility] )  Assessment & Plan  At baseline patient is minimally verbal with occasional agitation at night  · Continue home meds  · Continue supportive care  · Geriatrics following   · Has been doing well here  Likes to use figit toys     Sacral decubitus ulcer, stage II ([facility] )  Assessment & Plan  · Continue local wound care    Anemia  Assessment & Plan  Relatively chronic with baseline hemoglobin approximately 9-11  · Hgb stable as of last check          VTE Pharmacologic Prophylaxis: VTE Score: 10 High Risk (Score >/= 5) - Pharmacological DVT Prophylaxis Ordered: enoxaparin (Lovenox)  Sequential Compression Devices Ordered  Patient Centered Rounds: I performed bedside rounds with nursing staff today  Discussions with Specialists or Other Care Team Provider: case management    Education and Discussions with Family / Patient: Updated  (wife) via phone  Time Spent for Care: 30 minutes   More than 50% of total time spent on counseling and coordination of care as described above     Current Length of Stay: 12 day(s)  Current Patient Status: Inpatient   Certification Statement: The patient will continue to require additional inpatient hospital stay due to placement pending  Discharge Plan: Anticipate discharge tomorrow to rehab facility  Code Status: Level 3 - DNAR and DNI    Subjective:   No events overnight per nursing  Patient non verbal    Objective:     Vitals:   Temp (24hrs), Av 9 °F (35 5 °C), Min:95 9 °F (35 5 °C), Max:95 9 °F (35 5 °C)    Temp:  [95 9 °F (35 5 °C)] 95 9 °F (35 5 °C)  HR:  [61-90] 65  Resp:  [16-18] 16  BP: (112-129)/(55-74) 129/74  SpO2:  [95 %-99 %] 95 %  There is no height or weight on file to calculate BMI  Input and Output Summary (last 24 hours):   No intake or output data in the 24 hours ending 22 1141    Physical Exam:   Physical Exam  Vitals and nursing note reviewed  Constitutional:       Appearance: He is well-developed  HENT:      Head: Normocephalic and atraumatic  Eyes:      Conjunctiva/sclera: Conjunctivae normal    Cardiovascular:      Rate and Rhythm: Normal rate and regular rhythm  Heart sounds: No murmur heard  Pulmonary:      Effort: Pulmonary effort is normal  No respiratory distress  Breath sounds: Normal breath sounds  Abdominal:      Palpations: Abdomen is soft  Tenderness: There is no abdominal tenderness  Musculoskeletal:      Cervical back: Neck supple  Skin:     General: Skin is warm and dry  Neurological:      Mental Status: He is alert           Additional Data:     Labs:  Results from last 7 days   Lab Units 22  0442   WBC Thousand/uL 7 06   HEMOGLOBIN g/dL 10 0*   HEMATOCRIT % 31 4*   PLATELETS Thousands/uL 283   NEUTROS PCT % 68   LYMPHS PCT % 21   MONOS PCT % 8   EOS PCT % 2     Results from last 7 days   Lab Units 22  0442   SODIUM mmol/L 140   POTASSIUM mmol/L 3 8   CHLORIDE mmol/L 110*   CO2 mmol/L 22   BUN mg/dL 17   CREATININE mg/dL 0 54*   ANION GAP mmol/L 8 CALCIUM mg/dL 9 1   GLUCOSE RANDOM mg/dL 81                       Lines/Drains:  Invasive Devices  Report    None                       Imaging: No pertinent imaging reviewed  Recent Cultures (last 7 days):         Last 24 Hours Medication List:   Current Facility-Administered Medications   Medication Dose Route Frequency Provider Last Rate    acetaminophen  975 mg Oral [facility] Emiliano Lancaster MD      busPIRone  5 mg Oral BID Emiliano Lancaster MD      donepezil  10 mg Oral Daily Emiliano Lancaster MD      enoxaparin  40 mg Subcutaneous Daily Emiliano Lancaster MD      escitalopram  10 mg Oral Daily Emiliano Lancaster MD      fluticasone  1 spray Nasal Daily Emiliano Lancaster MD      lidocaine  1 patch Topical Daily Emiliano Lancaster MD      ondansetron  4 mg Intravenous Q6H PRN Emiliano Lancaster MD      oxyCODONE  2 5 mg Oral Q4H PRN Deepali Oates PA-C      oxyCODONE  5 mg Oral Q4H PRN Deepali Oates PA-C      pravastatin  40 mg Oral Daily With Carlota Lee MD      QUEtiapine  25 mg Oral HS Emiliano Lancaster MD      senna-docusate sodium  1 tablet Oral HS Emiliano Lancaster MD      tamsulosin  0 4 mg Oral Daily Emiliano Lancaster MD          Today, Patient Was Seen By: Deepali Oates PA-C    **Please Note: This note may have been constructed using a voice recognition system  **

## 2022-01-15 ENCOUNTER — TELEPHONE (OUTPATIENT)
Dept: OTHER | Facility: OTHER | Age: 77
End: 2022-01-15

## 2022-01-15 VITALS
HEART RATE: 54 BPM | TEMPERATURE: 98 F | DIASTOLIC BLOOD PRESSURE: 72 MMHG | OXYGEN SATURATION: 100 % | SYSTOLIC BLOOD PRESSURE: 124 MMHG | RESPIRATION RATE: 17 BRPM

## 2022-01-15 RX ORDER — OXYCODONE HYDROCHLORIDE 5 MG/1
5 TABLET ORAL EVERY 6 HOURS PRN
Qty: 30 TABLET | Refills: 0 | Status: SHIPPED | OUTPATIENT
Start: 2022-01-15 | End: 2022-01-25

## 2022-01-15 RX ORDER — ACETAMINOPHEN 325 MG/1
975 TABLET ORAL EVERY 8 HOURS SCHEDULED
Refills: 0
Start: 2022-01-15 | End: 2022-02-15

## 2022-01-15 RX ADMIN — DONEPEZIL HYDROCHLORIDE 10 MG: 10 TABLET ORAL at 09:02

## 2022-01-15 RX ADMIN — BUSPIRONE HYDROCHLORIDE 5 MG: 5 TABLET ORAL at 09:03

## 2022-01-15 RX ADMIN — ESCITALOPRAM OXALATE 10 MG: 10 TABLET ORAL at 09:03

## 2022-01-15 RX ADMIN — ACETAMINOPHEN 975 MG: 325 TABLET, FILM COATED ORAL at 05:13

## 2022-01-15 RX ADMIN — ENOXAPARIN SODIUM 40 MG: 40 INJECTION SUBCUTANEOUS at 09:02

## 2022-01-15 RX ADMIN — TAMSULOSIN HYDROCHLORIDE 0.4 MG: 0.4 CAPSULE ORAL at 09:03

## 2022-01-15 NOTE — CASE MANAGEMENT
Case Management Discharge Planning Note    Patient name Santo Mortensen  Location [address] [place] [address] [facility] [id number]  : 1945 Date 1/15/2022       Current Admission Date: 2022  Current Admission Diagnosis:Closed fracture of neck of left femur [facility])   Patient Active Problem List    Diagnosis Date Noted    COVID 2022    Frailty syndrome in geriatric patient 2022    Impaired vision 2022    High risk of developing delirium  2022    Closed fracture of neck of left femur ([address] ) 2022    Ambulatory dysfunction 2022    Moderate protein-calorie malnutrition ([facility] ) 2021    Sacral decubitus ulcer, stage II ([facility] ) 2021    Encephalopathy 2021    Fall 2021    Closed right hip fracture ([facility] ) 2021    Dysphagia 2021    S/P total knee replacement, right 2021    Anemia 2021    Alzheimer's disease ([facility] ) 2020    Memory impairment 2020    Hypercholesterolemia 2020    Benign prostatic hyperplasia without lower urinary tract symptoms 2020    Essential hypertension 2020    Elevated fasting glucose 2020      LOS (days): 13  Geometric Mean LOS (GMLOS) (days): 6 20  Days to GMLOS:-6 4     OBJECTIVE:  Risk of Unplanned Readmission Score: 22         Current admission status: Inpatient   Preferred Pharmacy:   [facility]pharmacy #[id number] [address]vedo [place] [address]  Phone: [phone] Fax: [phone]    Primary Care Provider: Sapna Yeung    Primary Insurance: MEDICARE  Secondary Insurance: [address] [place] [address] Dr:    Contacts  Patient Contacts: Karl Bravo  Relationship to Patient[de-identified] Family  Contact Method: Phone  Phone Number: [phone]  Reason/Outcome: Discharge Planning    Treatment Team Recommendation: Short Term Rehab  Discharge Destination Plan[de-identified] Short Term Rehab  Transport at Discharge : BLS Ambulance  Dispatcher Contacted: Yes  Number/Name of Dispatcher: [facility]  Transported by [employer] and Unit #): [facility]  ETA of Transport (Date): 01/15/22  ETA of Transport (Time): 1100    Additional Comments: Bedside RN, Spouse, Dr Tk Garg, and [facility] informed of transport time

## 2022-01-15 NOTE — ASSESSMENT & PLAN NOTE
Secondary to mechanical fall   · Ortho following  · S/p left hip cannulated screw fixation on 1/4  · Continue pain control with scheduled Tylenol, Lidoderm patch, prn low dose oxycodone   · Continue with DVT ppx   · PT/OT recommending rehab, has been medically stable, CM following  · Will be going to rehab today

## 2022-01-16 ENCOUNTER — NURSING HOME VISIT (OUTPATIENT)
Dept: GERIATRICS | Facility: OTHER | Age: 77
End: 2022-01-16
Payer: MEDICARE

## 2022-01-16 VITALS
HEART RATE: 84 BPM | WEIGHT: 155.4 LBS | BODY MASS INDEX: 22.95 KG/M2 | TEMPERATURE: 97 F | OXYGEN SATURATION: 96 % | RESPIRATION RATE: 18 BRPM

## 2022-01-16 DIAGNOSIS — S72.002D CLOSED FRACTURE OF NECK OF LEFT FEMUR WITH ROUTINE HEALING, SUBSEQUENT ENCOUNTER: Primary | ICD-10-CM

## 2022-01-16 PROCEDURE — 99305 1ST NF CARE MODERATE MDM 35: CPT | Performed by: INTERNAL MEDICINE

## 2022-01-16 NOTE — ASSESSMENT & PLAN NOTE
History of recent fall resulting in left hip fracture status post left hip cannulated screw fixation on 01/04/2022  Continue pain control with Tylenol level of patch and p r n  oxycodone  Continue PT OT  Patient remains on enoxaparin for DVT prophylaxis    Follow-up with orthopedic service

## 2022-01-16 NOTE — PROGRESS NOTES
[address], [address] [place] [address]  [id number]    Nursing Home Admission    NAME: Raymond Harmon  AGE: 67 y o  SEX: male [id number]      Patient Location     [place] [address] care was coordinated with nursing facility staff  Recent vitals, labs and updated medications were [facility]  Past Medical, surgical, social, medication and allergy history and patients previous records reviewed  Assessment/Plan:    Closed fracture of neck of left femur (HCC)  History of recent fall resulting in left hip fracture status post left hip cannulated screw fixation on 01/04/2022  Continue pain control with Tylenol level of patch and p r n  oxycodone  Continue PT OT  Patient remains on enoxaparin for DVT prophylaxis  Follow-up with orthopedic service    COVID-19 infection:  Patient tested positive for COVID-19 on routine screening  Remained asymptomatic, did not require any treatment  Will continue to monitor    Dementia with behavioral disturbances:  Patient has known history of Alzheimer's disease  He has had episodes of agitation irritability and at times combative behavior  Patient is refusing vitals and care  Pulled several staples off the incison Continue donepezil and Quetiapine  Continue redirection techniques    Anemia:  Lab Results   Component Value Date    HGB 10 0 (L) 01/09/2022   Recent hemoglobin was stable  Will continue to monitor    Sacral decubitus ulcer stage II:  Continue local care    Ambulatory dysfunction:  Continue PT OT  Chief Complaint     Closed fracture of neck of left femur status post cannulated screw fixation, COVID-19 infection, ambulatory dysfunction, Alzheimer's disease    HPI       Patient is a 67 y o  male with past medical history significant for dementia, hyperlipidemia, hypertension, BPH and anemia    Patient was hospitalized on 01/02/2020 to due to fall resulting in left femoral neck fracture  Patient underwent left hip cannulated screw fixation on 01/04/2022  Patient tested positive for COVID-19 on routine screening  Remained asymptomatic with respect to above, did not require any treatment  Patient was seen by PT OT services and subsequently discharged to [facility] where he is being seen for post hospital admission       Past Medical History:   Diagnosis Date    Alzheimer disease ([facility] [address] [facility] )     Anemia     BPH (benign prostatic hyperplasia)     Dementia ([facility] )     Hyperlipidemia     Hypertension     Memory loss        Past Surgical History:   Procedure Laterality Date    COLONOSCOPY  11/19/2019    5 years    ORIF HIP FRACTURE Left 1/4/2022    Procedure: OPEN REDUCTION W/ INTERNAL FIXATION (ORIF) HIP WITH CANNUALATED SCREWS;  Surgeon: Mariza Ambrosio MD;  [facility];  Service: Orthopedics    [place] OPEN RX FEMUR FX+INTRAMED JOSE Right 11/5/2021    Procedure: INSERTION LONG NAIL IM FEMUR ANTEGRADE (TROCHANTERIC);   Surgeon: Aida Wright DO;  Location: [facility];  Service: Orthopedics    REPLACEMENT TOTAL KNEE Right 06/19/2019       Social History     Tobacco Use   Smoking Status Former Smoker    Packs/day: 0 25    Years: 10 00    Pack years: 2 50   Smokeless Tobacco Never Used   Tobacco Comment    1PPW          Family History   Problem Relation Age of Onset    Lung disease Father         coal workers' pneumoconiosis        No Known Allergies       Current Outpatient Medications:     acetaminophen (TYLENOL) 325 mg tablet, Take 3 tablets (975 mg total) by mouth every 8 (eight) hours, Disp: , Rfl: 0    busPIRone (BUSPAR) 5 mg tablet, Take 1 tablet (5 mg total) by mouth 2 (two) times a day, Disp: 90 tablet, Rfl: 0    cyanocobalamin 1,000 mcg/mL, Inject 1 mL (1,000 mcg total) into a muscle every 30 (thirty) days for 12 doses, Disp: 12 mL, Rfl: 1    donepezil (ARICEPT) 10 mg tablet, Take 1 tablet (10 mg total) by mouth daily, Disp: 90 tablet, Rfl: 1   enoxaparin (LOVENOX) 40 mg/0 4 mL, Inject 0 4 mL (40 mg total) under the skin daily for 28 days, Disp: 11 2 mL, Rfl: 0    escitalopram (LEXAPRO) 10 mg tablet, Take 1 tablet (10 mg total) by mouth daily, Disp: 90 tablet, Rfl: 0    fluticasone (FLONASE) 50 mcg/act nasal spray, SPRAY 1 SPRAY INTO EACH NOSTRIL EVERY DAY, Disp: 16 mL, Rfl: 2    oxyCODONE (Roxicodone) 5 immediate release tablet, Take 1 tablet (5 mg total) by mouth every 6 (six) hours as needed for moderate pain for up to 10 days Max Daily Amount: 20 mg, Disp: 30 tablet, Rfl: 0    QUEtiapine (SEROquel) 25 mg tablet, Take 1 tablet (25 mg total) by mouth daily at bedtime, Disp: 90 tablet, Rfl: 0    senna-docusate sodium (SENOKOT S) 8 6-50 mg per tablet, Take 1 tablet by mouth daily at bedtime, Disp: 10 tablet, Rfl: 0    simvastatin (ZOCOR) 20 mg tablet, Take 1 tablet (20 mg total) by mouth daily at bedtime, Disp: 90 tablet, Rfl: 1    Skin Protectants, Misc  (Calazime Skin Protectant) PSTE, Apply to sacral wound daily, Disp: , Rfl: 0    Syringe, Disposable, (2-3CC SYRINGE) 3 ML MISC, Use every 30 (thirty) days for 12 doses Please provide needles  And syringes for B12 injections, Disp: 12 each, Rfl: 1    tamsulosin (FLOMAX) 0 4 mg, Take 1 capsule (0 4 mg total) by mouth daily, Disp: 90 capsule, Rfl: 1  No current facility-administered medications for this visit  Updated list was reviewed in [facility] system of facility  Patient refused vitals upon admission    Review of Systems   Unable to perform ROS: Dementia   Constitutional: Negative for chills and fever  Respiratory: Negative for shortness of breath, wheezing and stridor  Cardiovascular: Negative for leg swelling  Gastrointestinal: Negative for abdominal distention and diarrhea  Genitourinary: Negative for hematuria  Musculoskeletal: Positive for gait problem  Neurological: Positive for weakness     Psychiatric/Behavioral: Positive for agitation, behavioral problems and confusion  Physical Exam  Constitutional:       General: He is not in acute distress  Appearance: He is well-developed  He is not diaphoretic  Comments: Restless, confused   HENT:      Head: Normocephalic and atraumatic  Nose: No rhinorrhea  Eyes:      General: No scleral icterus  Right eye: No discharge  Left eye: No discharge  Cardiovascular:      Rate and Rhythm: Normal rate and regular rhythm  Pulmonary:      Breath sounds: No stridor  No wheezing  Abdominal:      General: There is no distension  Palpations: Abdomen is soft  Tenderness: There is no abdominal tenderness  Musculoskeletal:      Cervical back: Neck supple  Right lower leg: No edema  Left lower leg: No edema  Skin:     Coloration: Skin is not jaundiced or pale  Findings: Erythema (Mild adjacen to left hip surgical inciision  No cellulitis  Staples intact with few gaps from where pt pulled the staples) present  Neurological:      General: No focal deficit present  Mental Status: He is alert  Cranial Nerves: No cranial nerve deficit  Psychiatric:      Comments: Restless, agitated per nurse           Diagnostic Data       Recent labs and imaging studies were reviewed  Lab Results   Component Value Date    WBC 7 06 01/09/2022    HGB 10 0 (L) 01/09/2022    HCT 31 4 (L) 01/09/2022    MCV 90 01/09/2022     01/09/2022     Lab Results   Component Value Date    SODIUM 140 01/09/2022    K 3 8 01/09/2022     (H) 01/09/2022    CO2 22 01/09/2022    BUN 17 01/09/2022    CREATININE 0 54 (L) 01/09/2022    GLUC 81 01/09/2022    CALCIUM 9 1 01/09/2022       Code Status:       DNR    Additional notes:        Care coordinated with pt's wife over the phone  Will hold off repeating labs due to pt's restless, combative behavior  Per staff pt has been refusing vitals         This note was electronically signed by Dr Dayana Cardoza

## 2022-01-17 ENCOUNTER — PATIENT OUTREACH (OUTPATIENT)
Dept: FAMILY MEDICINE CLINIC | Facility: CLINIC | Age: 77
End: 2022-01-17

## 2022-01-17 NOTE — PROGRESS NOTES
Chart review completed for known discharge from [address] to [facility] on 1/15/22   This CM removed from [facility] added to Care Team

## 2022-01-19 ENCOUNTER — PATIENT OUTREACH (OUTPATIENT)
Dept: CASE MANAGEMENT | Facility: HOSPITAL | Age: 77
End: 2022-01-19

## 2022-01-20 ENCOUNTER — TELEPHONE (OUTPATIENT)
Dept: OBGYN CLINIC | Facility: HOSPITAL | Age: 77
End: 2022-01-20

## 2022-01-20 NOTE — TELEPHONE ENCOUNTER
Hello,  Please advise if the following patient can be forced onto the schedule:    Patient:   Sacha Blackman  :  1945  MRN: [id number]  Callback: [phone] ext   [address] ([address]) Genoveva Gardner states patient is on quarantine at their facility until   Reason for appointment:   1st p/o l hip surgery   Requested doctor/location:  German Carpio

## 2022-01-24 ENCOUNTER — NURSING HOME VISIT (OUTPATIENT)
Dept: GERIATRICS | Facility: OTHER | Age: 77
End: 2022-01-24
Payer: MEDICARE

## 2022-01-24 ENCOUNTER — TELEPHONE (OUTPATIENT)
Dept: OBGYN CLINIC | Facility: HOSPITAL | Age: 77
End: 2022-01-24

## 2022-01-24 VITALS
RESPIRATION RATE: 19 BRPM | BODY MASS INDEX: 22.95 KG/M2 | SYSTOLIC BLOOD PRESSURE: 137 MMHG | DIASTOLIC BLOOD PRESSURE: 65 MMHG | WEIGHT: 155.4 LBS | HEART RATE: 76 BPM | OXYGEN SATURATION: 98 % | TEMPERATURE: 98 F

## 2022-01-24 DIAGNOSIS — S72.002D CLOSED FRACTURE OF NECK OF LEFT FEMUR WITH ROUTINE HEALING, SUBSEQUENT ENCOUNTER: Primary | ICD-10-CM

## 2022-01-24 PROCEDURE — 99309 SBSQ NF CARE MODERATE MDM 30: CPT | Performed by: INTERNAL MEDICINE

## 2022-01-24 NOTE — TELEPHONE ENCOUNTER
Dr Mery Dodd from [facility] states that they removed the staples over the weekend  His wife would like to know if he still needs to be seen 1/25, or can it be postponed? Please advise      # [phone] ext [phone]

## 2022-01-24 NOTE — TELEPHONE ENCOUNTER
I advised that coming in for post-op evaluation and xrays may increase his weight bearing status if there is enough healing   Advised they should keep PO appt  Please advise

## 2022-01-24 NOTE — ASSESSMENT & PLAN NOTE
History of recent fall resulting in left hip fracture status post left hip cannulated screw fixation on 01/04/2022  Pain control adequate on Tylenol 975 mg q 8 hours  Will reduce dose to b i d  and follow  Continue enoxaparin for DVT prophylaxis   follow-up with orthopedic service

## 2022-01-24 NOTE — PROGRESS NOTES
[address]  [address]   [address] [place] [address], Angel Guzman U  48     Progress Note  Code SNF 31     Patient Location     [facility]    Reason for visit     Follow-up left femur fracture, dementia, COVID-19, depression, anxiety  Patients care was coordinated with nursing facility staff  Recent vitals, labs and updated medications were reviewed on [employer] system of facility  Problem List Items Addressed This Visit        Musculoskeletal and Integument    Closed fracture of neck of left femur ([address] ) - Primary     History of recent fall resulting in left hip fracture status post left hip cannulated screw fixation on 01/04/2022  Pain control adequate on Tylenol 975 mg q 8 hours  Will reduce dose to b i d  and follow  Continue enoxaparin for DVT prophylaxis  follow-up with orthopedic service             COVID-19 infection:  Patient tested positive for COVID-19 recently  Remains asymptomatic  Recently completed isolation     Will continue to monitor    Dementia with behavioral disturbances:  Patient has had episodes of irritability agitation and combative behavior  Continue escitalopram, donepezil and Quetiapine    Anxiety:  Continue escitalopram and buspirone    Ambulatory dysfunction:  Continue PT OT    Anemia:  Recent hemoglobin was 10  Further labs are not being done as patient tends to get combative with care      HPI       Patient is being seen for a follow-up visit today  Remains confused, unable to provide any meaningful history  Recently completed isolation for COVID-19  Respiratory status remains stable  Patient remains afebrile  Patient has had ongoing behavioral issues with periods of irritability agitation and aggressive behavior  Patient sustained an accidental fall few days ago  No injuries were reported  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, needing assist of 2 for transfers    No fever chills chest congestion or dyspnea    Review of Systems   Constitutional: Positive for fatigue  Respiratory: Negative for shortness of breath, wheezing and stridor  Cardiovascular: Negative for chest pain and leg swelling  Gastrointestinal: Negative for abdominal distention, diarrhea and vomiting  Musculoskeletal: Positive for gait problem  Neurological: Positive for weakness  Psychiatric/Behavioral: Positive for agitation, behavioral problems and confusion  Past Medical History:   Diagnosis Date    Alzheimer disease ([facility] )     Anemia     BPH (benign prostatic hyperplasia)     Dementia ([facility] [address] )     Hyperlipidemia     Hypertension     Memory loss        Past Surgical History:   Procedure Laterality Date    COLONOSCOPY  11/19/2019    5 years    ORIF HIP FRACTURE Left 1/4/2022    Procedure: OPEN REDUCTION W/ INTERNAL FIXATION (ORIF) HIP WITH CANNUALATED SCREWS;  Surgeon: Jean Marie Hammond MD;  [facility];  Service: Orthopedics    [place] OPEN RX FEMUR FX+INTRAMED JOSE Right 11/5/2021    Procedure: INSERTION LONG NAIL IM FEMUR ANTEGRADE (TROCHANTERIC);   Surgeon: Janelle Rocha DO;  [facility];  Service: Orthopedics    REPLACEMENT TOTAL KNEE Right 06/19/2019       Social History     Tobacco Use   Smoking Status Former Smoker    Packs/day: 0 25    Years: 10 00    Pack years: 2 50   Smokeless Tobacco Never Used   Tobacco Comment    1PPW       Family History   Problem Relation Age of Onset    Lung disease Father         coal workers' pneumoconiosis        No Known Allergies      Current Outpatient Medications:     acetaminophen (TYLENOL) 325 mg tablet, Take 3 tablets (975 mg total) by mouth every 8 (eight) hours, Disp: , Rfl: 0    busPIRone (BUSPAR) 5 mg tablet, Take 1 tablet (5 mg total) by mouth 2 (two) times a day, Disp: 90 tablet, Rfl: 0    cyanocobalamin 1,000 mcg/mL, Inject 1 mL (1,000 mcg total) into a muscle every 30 (thirty) days for 12 doses, Disp: 12 mL, Rfl: 1    donepezil (ARICEPT) 10 mg tablet, Take 1 tablet (10 mg total) by mouth daily, Disp: 90 tablet, Rfl: 1    enoxaparin (LOVENOX) 40 mg/0 4 mL, Inject 0 4 mL (40 mg total) under the skin daily for 28 days, Disp: 11 2 mL, Rfl: 0    escitalopram (LEXAPRO) 10 mg tablet, Take 1 tablet (10 mg total) by mouth daily, Disp: 90 tablet, Rfl: 0    fluticasone (FLONASE) 50 mcg/act nasal spray, SPRAY 1 SPRAY INTO EACH NOSTRIL EVERY DAY, Disp: 16 mL, Rfl: 2    oxyCODONE (Roxicodone) 5 immediate release tablet, Take 1 tablet (5 mg total) by mouth every 6 (six) hours as needed for moderate pain for up to 10 days Max Daily Amount: 20 mg, Disp: 30 tablet, Rfl: 0    QUEtiapine (SEROquel) 25 mg tablet, Take 1 tablet (25 mg total) by mouth daily at bedtime, Disp: 90 tablet, Rfl: 0    senna-docusate sodium (SENOKOT S) 8 6-50 mg per tablet, Take 1 tablet by mouth daily at bedtime, Disp: 10 tablet, Rfl: 0    simvastatin (ZOCOR) 20 mg tablet, Take 1 tablet (20 mg total) by mouth daily at bedtime, Disp: 90 tablet, Rfl: 1    Skin Protectants, Misc  (Calazime Skin Protectant) PSTE, Apply to sacral wound daily, Disp: , Rfl: 0    Syringe, Disposable, (2-3CC SYRINGE) 3 ML MISC, Use every 30 (thirty) days for 12 doses Please provide needles  And syringes for B12 injections, Disp: 12 each, Rfl: 1    tamsulosin (FLOMAX) 0 4 mg, Take 1 capsule (0 4 mg total) by mouth daily, Disp: 90 capsule, Rfl: 1    Updated list was reviewed in [facility] system of facility  Vitals:    01/24/22 0848   BP: 137/65   Pulse: 76   Resp: 19   Temp: 98 °F (36 7 °C)   SpO2: 98%       Physical Exam  HENT:      Head: Normocephalic and atraumatic  Nose: No rhinorrhea  Eyes:      General: No scleral icterus  Right eye: No discharge  Left eye: No discharge  Cardiovascular:      Rate and Rhythm: Normal rate and regular rhythm  Pulmonary:      Breath sounds: No wheezing or rhonchi  Abdominal:      Palpations: Abdomen is soft  Tenderness: There is no abdominal tenderness   There is no guarding  Musculoskeletal:      Cervical back: Neck supple  Right lower leg: No edema  Skin:     Coloration: Skin is not jaundiced  Neurological:      General: No focal deficit present  Mental Status: He is disoriented  Cranial Nerves: No cranial nerve deficit  Psychiatric:         Mood and Affect: Mood normal          Behavior: Behavior normal        Diagnostic Data:    Recent labs were reviewed    Lab Results   Component Value Date    WBC 7 06 01/09/2022    HGB 10 0 (L) 01/09/2022    HCT 31 4 (L) 01/09/2022    MCV 90 01/09/2022     01/09/2022     Lab Results   Component Value Date    SODIUM 140 01/09/2022    K 3 8 01/09/2022     (H) 01/09/2022    CO2 22 01/09/2022    BUN 17 01/09/2022    CREATININE 0 54 (L) 01/09/2022    GLUC 81 01/09/2022    CALCIUM 9 1 01/09/2022         This note was electronically signed by Dr Tommi Seip

## 2022-01-25 ENCOUNTER — HOSPITAL ENCOUNTER (OUTPATIENT)
Dept: RADIOLOGY | Facility: HOSPITAL | Age: 77
Discharge: HOME | End: 2022-01-25
Attending: ORTHOPAEDIC SURGERY
Payer: MEDICARE

## 2022-01-25 ENCOUNTER — PATIENT OUTREACH (OUTPATIENT)
Dept: CASE MANAGEMENT | Facility: HOSPITAL | Age: 77
End: 2022-01-25

## 2022-01-25 ENCOUNTER — OFFICE VISIT (OUTPATIENT)
Dept: OBGYN CLINIC | Facility: HOSPITAL | Age: 77
End: 2022-01-25

## 2022-01-25 VITALS — WEIGHT: 155 LBS | BODY MASS INDEX: 22.96 KG/M2 | HEIGHT: 69 IN

## 2022-01-25 DIAGNOSIS — S72.002D CLOSED FRACTURE OF NECK OF LEFT FEMUR WITH ROUTINE HEALING, SUBSEQUENT ENCOUNTER: ICD-10-CM

## 2022-01-25 DIAGNOSIS — S72.002A CLOSED FRACTURE OF LEFT HIP, INITIAL ENCOUNTER (HCC): ICD-10-CM

## 2022-01-25 DIAGNOSIS — S72.002A CLOSED FRACTURE OF LEFT HIP, INITIAL ENCOUNTER (HCC): Primary | ICD-10-CM

## 2022-01-25 PROCEDURE — 99024 POSTOP FOLLOW-UP VISIT: CPT | Performed by: ORTHOPAEDIC SURGERY

## 2022-01-25 PROCEDURE — 73502 X-RAY EXAM HIP UNI 2-3 VIEWS: CPT

## 2022-01-25 NOTE — ASSESSMENT & PLAN NOTE
Due to his problems with mobility his wife is asking if she can just keep him at home and not bring him back in  We will make him weight-bearing as tolerated on both lower extremities in the facility he is currently residing  He can progress along and will be happy to see her back in 6 weeks if there is any problem

## 2022-01-25 NOTE — PROGRESS NOTES
Assessment/Plan:    No problem-specific Assessment & Plan notes found for this encounter  Diagnoses and all orders for this visit:    Closed fracture of left hip, initial encounter ([facility] [address] )  -     XR hip/pelv 2-3 vws left if performed; Future    Closed fracture of neck of left femur with routine healing, subsequent encounter          Subjective:      Patient ID: Raymond Harmon is a 67 y o  male  This is a 79-year-old fellow who is status post femoral neck fracture system fixation of is impacted left femoral neck fracture about 2 weeks or so ago  He now follows up  He is in the stretcher  He has rather significant dementia  The following portions of the patient's history were reviewed and updated as appropriate: allergies, current medications, past family history, past medical history, past social history, past surgical history and problem list     Review of Systems      Objective:      Ht 5' 9" (1 753 m)   Wt 70 3 kg (155 lb)   BMI 22 89 kg/m²          Physical Exam      On physical examination his incision is nicely healed  His alignment looks excellent  There is no sign of infection  Skin is intact

## 2022-01-31 DIAGNOSIS — G30.9 ALZHEIMER'S DISEASE (HCC): ICD-10-CM

## 2022-01-31 DIAGNOSIS — F02.80 ALZHEIMER'S DISEASE (HCC): ICD-10-CM

## 2022-01-31 RX ORDER — BUSPIRONE HYDROCHLORIDE 5 MG/1
5 TABLET ORAL 2 TIMES DAILY
Qty: 180 TABLET | Refills: 0 | Status: SHIPPED | OUTPATIENT
Start: 2022-01-31

## 2022-01-31 NOTE — TELEPHONE ENCOUNTER
Pharmacy requests refill on behalf of patient  Buspirone HCL 5 mg   Medication pended for provider approval

## 2022-02-02 ENCOUNTER — PATIENT OUTREACH (OUTPATIENT)
Dept: CASE MANAGEMENT | Facility: HOSPITAL | Age: 77
End: 2022-02-02

## 2022-02-03 ENCOUNTER — NURSING HOME VISIT (OUTPATIENT)
Dept: GERIATRICS | Facility: OTHER | Age: 77
End: 2022-02-03
Payer: MEDICARE

## 2022-02-03 VITALS
TEMPERATURE: 98.8 F | HEART RATE: 74 BPM | DIASTOLIC BLOOD PRESSURE: 74 MMHG | SYSTOLIC BLOOD PRESSURE: 139 MMHG | WEIGHT: 155.4 LBS | OXYGEN SATURATION: 95 % | RESPIRATION RATE: 20 BRPM | BODY MASS INDEX: 22.95 KG/M2

## 2022-02-03 DIAGNOSIS — S72.002D CLOSED FRACTURE OF NECK OF LEFT FEMUR WITH ROUTINE HEALING, SUBSEQUENT ENCOUNTER: Primary | ICD-10-CM

## 2022-02-03 PROCEDURE — 99309 SBSQ NF CARE MODERATE MDM 30: CPT | Performed by: INTERNAL MEDICINE

## 2022-02-03 NOTE — PROGRESS NOTES
[address]  [address]   [address] [place], [place], Angel Guzman U  48     Progress Note  Code SNF 31    Patient [address] [facility] 141    Reason for visit     F U left femur fracture, Alzheimer's dementia, Anxiety, HLD    Patients care was coordinated with nursing facility staff  Recent vitals, labs and updated medications were reviewed on [facility] of facility  Problem List Items Addressed This Visit        Musculoskeletal and Integument    Closed fracture of neck of left femur ([address] ) - Primary     History of recent fall resulting in left hip fracture status post left hip cannulated screw fixation on 01/04/2022  Recent orthopedic follow up notes were reviewed  WBAT was recommended  Continue enoxaparin for DVT prophylaxis through 2/13/22  Continue PT/OT  COVID-19 infection:  Patient tested positive for COVID-19 recently  Remains asymptomatic  Recently completed isolation     Will continue to monitor     Dementia with behavioral disturbances:  Patient has had episodes of irritability agitation and combative behavior  Continue escitalopram, donepezil and Quetiapine     Anxiety:  Continue escitalopram and buspirone     Ambulatory dysfunction:  Continue PT OT     Anemia:  Recent hemoglobin was 10  Further labs are not being done as get combative with care    HPI       Patient is being seen for a follow-up visit today  Remains confused  Patient is unable to provide any meaningful history due to dementia  He is unable to make his needs known  Patient was seen wandering in the hallways 2 days ago  Wander guards were placed  Patient was recently seen by orthopedic service for a follow-up visit  Recommendations for weight-bearing as tolerated noted  Patient at times get agitated and combative patient    At the time of my evaluation patient is sleeping comfortably    Review of Systems   Unable to perform ROS: Dementia (Patient is unable to provide any history due to dementia)   Constitutional: Negative for chills, fatigue and fever  HENT: Negative for facial swelling  Respiratory: Negative for cough, shortness of breath, wheezing and stridor  Gastrointestinal: Negative for abdominal distention and vomiting  Genitourinary: Negative for hematuria  Musculoskeletal: Positive for gait problem  Neurological: Positive for weakness  Psychiatric/Behavioral: Positive for agitation, behavioral problems and confusion  Sleep disturbance: at times  The patient is nervous/anxious  Past Medical History:   Diagnosis Date    Alzheimer disease ([facility] )     Anemia     BPH (benign prostatic hyperplasia)     Dementia ([facility] )     Hyperlipidemia     Hypertension     Memory loss        Past Surgical History:   Procedure Laterality Date    COLONOSCOPY  11/19/2019    5 years    ORIF HIP FRACTURE Left 1/4/2022    Procedure: OPEN REDUCTION W/ INTERNAL FIXATION (ORIF) HIP WITH CANNUALATED SCREWS;  Surgeon: Marycarmen Nixon MD;  [facility];  Service: Orthopedics    [place] OPEN RX FEMUR FX+INTRAMED JOSE Right 11/5/2021    Procedure: INSERTION LONG NAIL IM FEMUR ANTEGRADE (TROCHANTERIC);   Surgeon: Meron Diaz DO;  Location: [facility] Main OR;  Service: Orthopedics    REPLACEMENT TOTAL KNEE Right 06/19/2019       Social History     Tobacco Use   Smoking Status Former Smoker    Packs/day: 0 25    Years: 10 00    Pack years: 2 50   Smokeless Tobacco Never Used   Tobacco Comment    1PPW       Family History   Problem Relation Age of Onset    Lung disease Father         coal workers' pneumoconiosis        No Known Allergies      Current Outpatient Medications:     acetaminophen (TYLENOL) 325 mg tablet, Take 3 tablets (975 mg total) by mouth every 8 (eight) hours, Disp: , Rfl: 0    busPIRone (BUSPAR) 5 mg tablet, Take 1 tablet (5 mg total) by mouth 2 (two) times a day, Disp: 180 tablet, Rfl: 0    cyanocobalamin 1,000 mcg/mL, Inject 1 mL (1,000 mcg total) into a muscle every 30 (thirty) days for 12 doses, Disp: 12 mL, Rfl: 1    donepezil (ARICEPT) 10 mg tablet, Take 1 tablet (10 mg total) by mouth daily, Disp: 90 tablet, Rfl: 1    enoxaparin (LOVENOX) 40 mg/0 4 mL, Inject 0 4 mL (40 mg total) under the skin daily for 28 days, Disp: 11 2 mL, Rfl: 0    escitalopram (LEXAPRO) 10 mg tablet, Take 1 tablet (10 mg total) by mouth daily, Disp: 90 tablet, Rfl: 0    fluticasone (FLONASE) 50 mcg/act nasal spray, SPRAY 1 SPRAY INTO EACH NOSTRIL EVERY DAY, Disp: 16 mL, Rfl: 2    QUEtiapine (SEROquel) 25 mg tablet, Take 1 tablet (25 mg total) by mouth daily at bedtime, Disp: 90 tablet, Rfl: 0    senna-docusate sodium (SENOKOT S) 8 6-50 mg per tablet, Take 1 tablet by mouth daily at bedtime, Disp: 10 tablet, Rfl: 0    simvastatin (ZOCOR) 20 mg tablet, Take 1 tablet (20 mg total) by mouth daily at bedtime, Disp: 90 tablet, Rfl: 1    Skin Protectants, Misc  (Calazime Skin Protectant) PSTE, Apply to sacral wound daily, Disp: , Rfl: 0    Syringe, Disposable, (2-3CC SYRINGE) 3 ML MISC, Use every 30 (thirty) days for 12 doses Please provide needles  And syringes for B12 injections, Disp: 12 each, Rfl: 1    tamsulosin (FLOMAX) 0 4 mg, Take 1 capsule (0 4 mg total) by mouth daily, Disp: 90 capsule, Rfl: 1    [facility]  Vitals:    02/03/22 2006   BP: 139/74   Pulse: 74   Resp: 20   Temp: 98 8 °F (37 1 °C)   SpO2: 95%       Physical Exam  Constitutional:       Comments: Pt appears to be weak and frail   HENT:      Head: Normocephalic and atraumatic  Nose: No rhinorrhea  Mouth/Throat:      Mouth: Mucous membranes are dry  Eyes:      General:         Right eye: No discharge  Left eye: No discharge  Cardiovascular:      Rate and Rhythm: Normal rate and regular rhythm  Abdominal:      General: There is no distension  Palpations: Abdomen is soft  Tenderness:  There is no abdominal tenderness  Musculoskeletal:      Right lower leg: No edema  Left lower leg: No edema  Skin:     Coloration: Skin is not jaundiced  Neurological:      Mental Status: He is disoriented  Motor: Weakness (Generalized) present        Comments: Oriented in month and year         Diagnostic Data:    Lab Results   Component Value Date    WBC 7 06 01/09/2022    HGB 10 0 (L) 01/09/2022    HCT 31 4 (L) 01/09/2022    MCV 90 01/09/2022     01/09/2022     Lab Results   Component Value Date    SODIUM 140 01/09/2022    K 3 8 01/09/2022     (H) 01/09/2022    CO2 22 01/09/2022    BUN 17 01/09/2022    CREATININE 0 54 (L) 01/09/2022    GLUC 81 01/09/2022    CALCIUM 9 1 01/09/2022       This note was electronically signed by Dr Mehrdad Hidalgo

## 2022-02-04 NOTE — ASSESSMENT & PLAN NOTE
History of recent fall resulting in left hip fracture status post left hip cannulated screw fixation on 01/04/2022  Recent orthopedic follow up notes were reviewed  WBAT was recommended  Continue enoxaparin for DVT prophylaxis through 2/13/22  Continue PT/OT

## 2022-02-07 ENCOUNTER — TELEMEDICINE (OUTPATIENT)
Dept: GERIATRICS | Facility: OTHER | Age: 77
End: 2022-02-07
Payer: MEDICARE

## 2022-02-07 DIAGNOSIS — S72.002D CLOSED FRACTURE OF NECK OF LEFT FEMUR WITH ROUTINE HEALING, SUBSEQUENT ENCOUNTER: Primary | ICD-10-CM

## 2022-02-07 PROCEDURE — 99308 SBSQ NF CARE LOW MDM 20: CPT | Performed by: INTERNAL MEDICINE

## 2022-02-07 NOTE — ASSESSMENT & PLAN NOTE
Status post left hip cannulated screw fixation on 01/04/2022  Patient remains on Lovenox for DVT prophylaxis  Continue PT OT  Follow-up with orthopedics  Pain control appears to be adequate on Tylenol 975 mg b i d     Will attempt dose reduction in few days

## 2022-02-07 NOTE — PROGRESS NOTES
[facility]  [address] [place] [address]  [id number]    Nursing Home progress note    NAME: Clarita Acuna  AGE: 67 y o  SEX: male [id number]      Patient Location     [place] [address] care was coordinated with nursing facility staff  Recent hospital records, EKG, vitals, labs and updated medications were reviewed on "[employer]  Past Medical, surgical, social, medication and allergy history and patients previous records reviewed  The patient was identified by name and date of birth, Clarita Acuna 1945  was informed that this is a telemedicine visit and that the visit is being conducted through [employer]   Pt and his wife cknowledged consent and understanding of privacy and security of the video platform  The patient has agreed to participate and understands they can discontinue the visit at any time  Patient is aware this is a billable service  Nurse from the nursing facility was present and facilitated the video process  Patient's care was additionally coordinated with the nurse  Recent progress notes, vitals and medications were reviewed in [facility]  Assessment/Plan:    Closed fracture of neck of left femur (HCC)  Status post left hip cannulated screw fixation on 01/04/2022  Patient remains on Lovenox for DVT prophylaxis  Continue PT OT  Follow-up with orthopedics  Pain control appears to be adequate on Tylenol 975 mg b i d     Will attempt dose reduction in few days    COVID-19 infection:  Patient tested positive for COVID-19 recently  Remains asymptomatic  Recently completed isolation     Will continue to monitor     Dementia with behavioral disturbances:  Patient remains on Quetiapine for episodes of irritability and agitation    Overall behaviors have improved Continue escitalopram, donepezil and Quetiapine     Anxiety:  Continue escitalopram and buspirone     Ambulatory dysfunction:  Continue PT OT     Anemia:  Recent hemoglobin was 10  Further labs are not being done as get combative with care  HPI     Patient is a 67 y o  male  Patient is being seen for a follow-up visit today  He is noted to be more awake and alert today sitting up on the chair  Behaviors have improved however at times patient gets combative with care  Patient remains confused, he is unable to provide any meaningful history due to dementia  Care coordinated with patient's wife and the facility nurse  Reports of any fever chills dyspnea or chest congestion   Past Medical History:   Diagnosis Date    [facility] )     Anemia     BPH (benign prostatic hyperplasia)     D[facility] ([facility] )     Hyperlipidemia     Hypertension     Memory loss        Past Surgical History:   Procedure Laterality Date    COLONOSCOPY  11/19/2019    5 years    ORIF HIP FRACTURE Left 1/4/2022    Procedure: OPEN REDUCTION W/ INTERNAL FIXATION (ORIF) HIP WITH CANNUALATED SCREWS;  Surgeon: Marycarmen Nixon MD;  Location[facility];  Service: Orthopedics    [place] OPEN RX FEMUR FX+INTRAMED JOSE Right 11/5/2021    Procedure: INSERTION LONG NAIL IM FEMUR ANTEGRADE (TROCHANTERIC);   Surgeon: Meron Diaz DO;  [facility];  Service: Orthopedics    REPLACEMENT TOTAL KNEE Right 06/19/2019       Social History     Tobacco Use   Smoking Status Former Smoker    Packs/day: 0 25    Years: 10 00    Pack years: 2 50   Smokeless Tobacco Never Used   Tobacco Comment    1PPW          Family History   Problem Relation Age of Onset    Lung disease Father         coal workers' pneumoconiosis        No Known Allergies       Current Outpatient Medications:     acetaminophen (TYLENOL) 325 mg tablet, Take 3 tablets (975 mg total) by mouth every 8 (eight) hours, Disp: , Rfl: 0    busPIRone (BUSPAR) 5 mg tablet, Take 1 tablet (5 mg total) by mouth 2 (two) times a day, Disp: 180 tablet, Rfl: 0    cyanocobalamin 1,000 mcg/mL, Inject 1 mL (1,000 mcg total) into a muscle every 30 (thirty) days for 12 doses, Disp: 12 mL, Rfl: 1    donepezil (ARICEPT) 10 mg tablet, Take 1 tablet (10 mg total) by mouth daily, Disp: 90 tablet, Rfl: 1    enoxaparin (LOVENOX) 40 mg/0 4 mL, Inject 0 4 mL (40 mg total) under the skin daily for 28 days, Disp: 11 2 mL, Rfl: 0    escitalopram (LEXAPRO) 10 mg tablet, Take 1 tablet (10 mg total) by mouth daily, Disp: 90 tablet, Rfl: 0    fluticasone (FLONASE) 50 mcg/act nasal spray, SPRAY 1 SPRAY INTO EACH NOSTRIL EVERY DAY, Disp: 16 mL, Rfl: 2    QUEtiapine (SEROquel) 25 mg tablet, Take 1 tablet (25 mg total) by mouth daily at bedtime, Disp: 90 tablet, Rfl: 0    senna-docusate sodium (SENOKOT S) 8 6-50 mg per tablet, Take 1 tablet by mouth daily at bedtime, Disp: 10 tablet, Rfl: 0    simvastatin (ZOCOR) 20 mg tablet, Take 1 tablet (20 mg total) by mouth daily at bedtime, Disp: 90 tablet, Rfl: 1    Skin Protectants, Misc  (Calazime Skin Protectant) PSTE, Apply to sacral wound daily, Disp: , Rfl: 0    Syringe, Disposable, (2-3CC SYRINGE) 3 ML MISC, Use every 30 (thirty) days for 12 doses Please provide needles  And syringes for B12 injections, Disp: 12 each, Rfl: 1    tamsulosin (FLOMAX) 0 4 mg, Take 1 capsule (0 4 mg total) by mouth daily, Disp: 90 capsule, Rfl: 1    Updated list was reviewed in [facility] of facility  There were no vitals filed for this visit  Review of Systems   Unable to perform ROS: Dementia   Constitutional: Negative for chills and fever  Respiratory: Negative for shortness of breath, wheezing and stridor  Cardiovascular: Negative for chest pain  Gastrointestinal: Negative for abdominal distention and vomiting  Genitourinary: Negative for hematuria  Neurological: Positive for weakness  Psychiatric/Behavioral: Positive for agitation, behavioral problems and confusion  Physical Exam  HENT:      Nose: No rhinorrhea  Eyes:      General:         Left eye: No discharge     Abdominal: General: There is no distension  Musculoskeletal:      Cervical back: Neck supple  Right lower leg: No edema  Left lower leg: No edema  Skin:     Coloration: Skin is not jaundiced  Neurological:      General: No focal deficit present  Mental Status: He is disoriented  Cranial Nerves: No cranial nerve deficit  Psychiatric:         Mood and Affect: Mood normal            Diagnostic Data       Previous labs were reviewed  Lab Results   Component Value Date    WBC 7 06 01/09/2022    HGB 10 0 (L) 01/09/2022    HCT 31 4 (L) 01/09/2022    MCV 90 01/09/2022     01/09/2022     Lab Results   Component Value Date    SODIUM 140 01/09/2022    K 3 8 01/09/2022     (H) 01/09/2022    CO2 22 01/09/2022    BUN 17 01/09/2022    CREATININE 0 54 (L) 01/09/2022    GLUC 81 01/09/2022    CALCIUM 9 1 01/09/2022     Additional notes:     Care coordinated with patient's wife        VIRTUAL VISIT DISCLAIMER    Pt acknowledges that she has consented to an online visit or consultation  She understands that the online visit is based solely on information provided by her, and that, in the absence of a face-to-face physical evaluation by the physician, the diagnosis she receives is both limited and provisional in terms of accuracy and completeness  This is not intended to replace a full medical face-to-face evaluation by the physician  Pt understands and accepts these terms        This note was electronically signed by Dr Zulma Orourke

## 2022-02-10 ENCOUNTER — EPISODE CHANGES (OUTPATIENT)
Dept: CASE MANAGEMENT | Facility: OTHER | Age: 77
End: 2022-02-10

## 2022-02-10 ENCOUNTER — NURSING HOME VISIT (OUTPATIENT)
Dept: GERIATRICS | Facility: OTHER | Age: 77
End: 2022-02-10
Payer: MEDICARE

## 2022-02-10 VITALS
RESPIRATION RATE: 18 BRPM | DIASTOLIC BLOOD PRESSURE: 62 MMHG | BODY MASS INDEX: 22.95 KG/M2 | HEART RATE: 86 BPM | WEIGHT: 155.4 LBS | OXYGEN SATURATION: 95 % | TEMPERATURE: 97.6 F | SYSTOLIC BLOOD PRESSURE: 114 MMHG

## 2022-02-10 DIAGNOSIS — L98.9 SKIN LESION: Primary | ICD-10-CM

## 2022-02-10 PROCEDURE — 99309 SBSQ NF CARE MODERATE MDM 30: CPT | Performed by: INTERNAL MEDICINE

## 2022-02-11 NOTE — ASSESSMENT & PLAN NOTE
Patient was noted to have few  raised nodular skin areas involving left upper chest   No adjacent erythema  Lesions are not convincing for varicella zoster  Will hold off antiviral treatment  Continue to observe for now    Nurse was advised to call me should patient develop any erythema or fluid-filled blisters

## 2022-02-11 NOTE — PROGRESS NOTES
[address]  [address]   [address] [place], [place] [address]Angel U  48     Progress Note  Code SNF 31    Patient Location     [facility]    Reason for visit     Raised skin lesions involving left side of the chest    Patients care was coordinated with nursing facility staff  Recent vitals, labs and updated medications were [facility]  Problem List Items Addressed This Visit        Musculoskeletal and Integument    Skin lesion - Primary     Patient was noted to have few  raised nodular skin areas involving left upper chest   No adjacent erythema  Lesions are not convincing for varicella zoster  Will hold off antiviral treatment  Continue to observe for now  Nurse was advised to call me should patient develop any erythema or fluid-filled blisters             Closed fracture of neck of left femur :  Status post left hip cannulated screw fixation on 01/04/2022  Patient remains on Lovenox for DVT prophylaxis  Continue PT OT  Follow-up with orthopedics  Continue Tylenol     COVID-19 infection:  Patient tested positive for COVID-19 recently  Remains asymptomatic  Recently completed isolation     Will continue to monitor     Dementia with behavioral disturbances:  Patient remains on Quetiapine for episodes of irritability and agitation  Overall behaviors have improved Continue escitalopram, donepezil and Quetiapine     Anxiety:  Continue escitalopram and buspirone      HPI         Patient is being seen for evaluation of  nodular  Skin lesion involving upper left chest   Skin lesions are noticed by nursing staff earlier  There was a question of shingles   Patient has advanced dementia  He is unable to provide any history  Does not appear to be in pain  No fever chills or chest congestion    Review of Systems   Unable to perform ROS: Dementia   Respiratory: Negative for shortness of breath, wheezing and stridor  Cardiovascular: Negative for leg swelling  Gastrointestinal: Negative for abdominal distention and vomiting  Musculoskeletal: Positive for gait problem  Skin:        Raised skin lesion over left upper chest   Neurological: Negative for seizures  Psychiatric/Behavioral: Positive for agitation (at times), behavioral problems and confusion  Past Medical History:   Diagnosis Date    [facility] ([facility] )     Anemia     BPH (benign prostatic hyperplasia)     Dementia ([facility] )     Hyperlipidemia     Hypertension     Memory loss        Past Surgical History:   Procedure Laterality Date    COLONOSCOPY  11/19/2019    5 years    ORIF HIP FRACTURE Left 1/4/2022    Procedure: OPEN REDUCTION W/ INTERNAL FIXATION (ORIF) HIP WITH CANNUALATED SCREWS;  Surgeon: Radha Iniguez MD;  [facility]: [facility];  Service: Orthopedics    OR OPEN RX FEMUR FX+INTRAMED JOSE Right 11/5/2021    Procedure: INSERTION LONG NAIL IM FEMUR ANTEGRADE (TROCHANTERIC);   Surgeon: Gregoria Hernandez DO;  [facility]: [facility];  Service: Orthopedics    REPLACEMENT TOTAL KNEE Right 06/19/2019       Social History     Tobacco Use   Smoking Status Former Smoker    Packs/day: 0 25    Years: 10 00    Pack years: 2 50   Smokeless Tobacco Never Used   Tobacco Comment    1PPW       Family History   Problem Relation Age of Onset    Lung disease Father         coal workers' pneumoconiosis        No Known Allergies      Current Outpatient Medications:     acetaminophen (TYLENOL) 325 mg tablet, Take 3 tablets (975 mg total) by mouth every 8 (eight) hours, Disp: , Rfl: 0    busPIRone (BUSPAR) 5 mg tablet, Take 1 tablet (5 mg total) by mouth 2 (two) times a day, Disp: 180 tablet, Rfl: 0    cyanocobalamin 1,000 mcg/mL, Inject 1 mL (1,000 mcg total) into a muscle every 30 (thirty) days for 12 doses, Disp: 12 mL, Rfl: 1    donepezil (ARICEPT) 10 mg tablet, Take 1 tablet (10 mg total) by mouth daily, Disp: 90 tablet, Rfl: 1    enoxaparin (LOVENOX) 40 mg/0 4 mL, Inject 0 4 mL (40 mg total) under the skin daily for 28 days, Disp: 11 2 mL, Rfl: 0    escitalopram (LEXAPRO) 10 mg tablet, Take 1 tablet (10 mg total) by mouth daily, Disp: 90 tablet, Rfl: 0    fluticasone (FLONASE) 50 mcg/act nasal spray, SPRAY 1 SPRAY INTO EACH NOSTRIL EVERY DAY, Disp: 16 mL, Rfl: 2    QUEtiapine (SEROquel) 25 mg tablet, Take 1 tablet (25 mg total) by mouth daily at bedtime, Disp: 90 tablet, Rfl: 0    senna-docusate sodium (SENOKOT S) 8 6-50 mg per tablet, Take 1 tablet by mouth daily at bedtime, Disp: 10 tablet, Rfl: 0    simvastatin (ZOCOR) 20 mg tablet, Take 1 tablet (20 mg total) by mouth daily at bedtime, Disp: 90 tablet, Rfl: 1    Skin Protectants, Misc  (Calazime Skin Protectant) PSTE, Apply to sacral wound daily, Disp: , Rfl: 0    Syringe, Disposable, (2-3CC SYRINGE) 3 ML MISC, Use every 30 (thirty) days for 12 doses Please provide needles  And syringes for B12 injections, Disp: 12 each, Rfl: 1    tamsulosin (FLOMAX) 0 4 mg, Take 1 capsule (0 4 mg total) by mouth daily, Disp: 90 capsule, Rfl: 1    Updated list was reviewed in [facility] system of facility  Vitals:    02/10/22 2111   BP: 114/62   Pulse: 86   Resp: 18   Temp: 97 6 °F (36 4 °C)   SpO2: 95%       Physical Exam  HENT:      Head: Normocephalic and atraumatic  Cardiovascular:      Rate and Rhythm: Regular rhythm  Pulmonary:      Breath sounds: No wheezing, rhonchi or rales  Abdominal:      General: There is no distension  Palpations: Abdomen is soft  Tenderness: There is no abdominal tenderness  Musculoskeletal:      Right lower leg: No edema  Left lower leg: No edema  Skin:     Coloration: Skin is not jaundiced  Comments: Few raised skin areas over left upper chest   No fluid-filled blister, no erythema   Neurological:      General: No focal deficit present  Diagnostic Data:    Recent labs were reviewed  Additional Notes:      This note was electronically signed by Dr Roger Ghotra

## 2022-02-15 ENCOUNTER — NURSING HOME VISIT (OUTPATIENT)
Dept: GERIATRICS | Facility: OTHER | Age: 77
End: 2022-02-15
Payer: MEDICARE

## 2022-02-15 VITALS
HEART RATE: 76 BPM | DIASTOLIC BLOOD PRESSURE: 69 MMHG | OXYGEN SATURATION: 94 % | RESPIRATION RATE: 20 BRPM | BODY MASS INDEX: 22.95 KG/M2 | SYSTOLIC BLOOD PRESSURE: 120 MMHG | WEIGHT: 155.4 LBS | TEMPERATURE: 97.2 F

## 2022-02-15 DIAGNOSIS — F02.80 ALZHEIMER'S DISEASE (HCC): Primary | ICD-10-CM

## 2022-02-15 DIAGNOSIS — S72.002A FRACTURE OF UNSPECIFIED PART OF NECK OF LEFT FEMUR, INITIAL ENCOUNTER FOR CLOSED FRACTURE (HCC): ICD-10-CM

## 2022-02-15 DIAGNOSIS — G30.9 ALZHEIMER'S DISEASE (HCC): Primary | ICD-10-CM

## 2022-02-15 DIAGNOSIS — G93.40 ENCEPHALOPATHY: ICD-10-CM

## 2022-02-15 PROCEDURE — 99309 SBSQ NF CARE MODERATE MDM 30: CPT | Performed by: INTERNAL MEDICINE

## 2022-02-15 RX ORDER — QUETIAPINE FUMARATE 25 MG/1
50 TABLET, FILM COATED ORAL 2 TIMES DAILY
COMMUNITY
End: 2022-04-21

## 2022-02-15 RX ORDER — ACETAMINOPHEN 325 MG/1
975 TABLET ORAL 2 TIMES DAILY
COMMUNITY
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
## 2022-02-22 ENCOUNTER — NURSING HOME VISIT (OUTPATIENT)
Dept: GERIATRICS | Facility: OTHER | Age: 77
End: 2022-02-22
Payer: MEDICARE

## 2022-02-22 VITALS
HEART RATE: 71 BPM | DIASTOLIC BLOOD PRESSURE: 69 MMHG | RESPIRATION RATE: 18 BRPM | BODY MASS INDEX: 22.95 KG/M2 | WEIGHT: 155.4 LBS | OXYGEN SATURATION: 96 % | TEMPERATURE: 97.3 F | SYSTOLIC BLOOD PRESSURE: 120 MMHG

## 2022-02-22 DIAGNOSIS — F02.80 ALZHEIMER'S DISEASE (HCC): Primary | ICD-10-CM

## 2022-02-22 DIAGNOSIS — G30.9 ALZHEIMER'S DISEASE (HCC): Primary | ICD-10-CM

## 2022-02-22 PROCEDURE — 99309 SBSQ NF CARE MODERATE MDM 30: CPT | Performed by: INTERNAL MEDICINE

## 2022-02-22 NOTE — PROGRESS NOTES
[address]  [address] [facility]   [address] [place], [place]  48     Progress Note  Code SNF 31    Patient Location     [facility]    Reason for visit     Follow-up left femur fracture, dementia with behavioral disturbances, anemia, depression    Patients care was coordinated with nursing facility staff  Recent vitals, labs and updated medications were [facility]  Problem List Items Addressed This Visit        Nervous and Auditory    Dementia with behavioral disturbances - Primary     Patient continues with behavioral issues  She is noted to have episodes of agitation and combative behavior towards the staff  Seroquel dose was recently increased to 25 mg b i d  however patient had issues with increased somnolence and lethargy  Dose was reduced to 25 mg q h s  and 12 5 mg q a m  Pravin Salas Patient additionally remains on escitalopram 10 mg daily, buspirone 5 mg b i d  and donepezil 10 mg daily  Continue same             Close fracture of neck of left femur:  Status post left hip cannulated screw fixation on 01/04/2022  Patient does not appear to have any acute issues with pain at this time  Currently receiving Tylenol 975 mg b i d     Will change to p r n  and follow    Anxiety:  Uncontrolled at times with episodes of increased irritability and agitation  Continue buspirone and escitalopram    Hyperlipidemia:  Continue simvastatin 20 mg daily    BPH:  Continue tamsulosin      HPI       Patient is being seen for a follow-up visit today  He is resting comfortably  Patient is unable to provide any history due to dementia  Care coordinated with nursing staff  Patient has had periods of irritability and agitation  At times gets combative towards the staff  He is not easily redirectable  Seroquel dose was recently increased to 25 mg bid however patient later had issues with increased lethargy and somnolence    Dose was reduced to 25 mg q h s  and 12 5 mg in the morning  Review of Systems   Unable to perform ROS: Dementia   Respiratory: Negative for shortness of breath, wheezing and stridor  Cardiovascular: Negative for leg swelling  Gastrointestinal: Negative for abdominal distention and vomiting  Genitourinary: Negative for hematuria  Musculoskeletal: Positive for gait problem  Neurological: Positive for weakness  Psychiatric/Behavioral: Positive for agitation, behavioral problems and confusion  Past Medical History:   Diagnosis Date    [facility] disease ([facility] [address] )     Anemia     BPH (benign prostatic hyperplasia)     Dementia ([facility] [address] )     Hyperlipidemia     Hypertension     Memory loss        Past Surgical History:   Procedure Laterality Date    COLONOSCOPY  11/19/2019    5 years    ORIF HIP FRACTURE Left 1/4/2022    Procedure: OPEN REDUCTION W/ INTERNAL FIXATION (ORIF) HIP WITH CANNUALATED SCREWS;  Surgeon: Jean Marie Hammond MD;  Location: [facility];  Service: Orthopedics    [place] OPEN RX FEMUR FX+INTRAMED JOSE Right 11/5/2021    Procedure: INSERTION LONG NAIL IM FEMUR ANTEGRADE (TROCHANTERIC);   Surgeon: Janelle Rocha DO;  [facility];  Service: Orthopedics    REPLACEMENT TOTAL KNEE Right 06/19/2019       Social History     Tobacco Use   Smoking Status Former Smoker    Packs/day: 0 25    Years: 10 00    Pack years: 2 50   Smokeless Tobacco Never Used   Tobacco Comment    1PPW       Family History   Problem Relation Age of Onset    Lung disease Father         coal workers' pneumoconiosis        No Known Allergies      Current Outpatient Medications:     acetaminophen (TYLENOL) 325 mg tablet, Take 975 mg by mouth 2 (two) times a day, Disp: , Rfl:     busPIRone (BUSPAR) 5 mg tablet, Take 1 tablet (5 mg total) by mouth 2 (two) times a day, Disp: 180 tablet, Rfl: 0    cyanocobalamin 1,000 mcg/mL, Inject 1 mL (1,000 mcg total) into a muscle every 30 (thirty) days for 12 doses, Disp: 12 mL, Rfl: 1    donepezil (ARICEPT) 10 mg tablet, Take 1 tablet (10 mg total) by mouth daily, Disp: 90 tablet, Rfl: 1    escitalopram (LEXAPRO) 10 mg tablet, Take 1 tablet (10 mg total) by mouth daily, Disp: 90 tablet, Rfl: 0    fluticasone (FLONASE) 50 mcg/act nasal spray, SPRAY 1 SPRAY INTO EACH NOSTRIL EVERY DAY, Disp: 16 mL, Rfl: 2    QUEtiapine (SEROquel) 25 mg tablet, Take 25 mg by mouth daily at bedtime 12 5 mg qam , Disp: , Rfl:     senna-docusate sodium (SENOKOT S) 8 6-50 mg per tablet, Take 1 tablet by mouth daily at bedtime, Disp: 10 tablet, Rfl: 0    simvastatin (ZOCOR) 20 mg tablet, Take 1 tablet (20 mg total) by mouth daily at bedtime, Disp: 90 tablet, Rfl: 1    Skin Protectants, Misc  (Calazime Skin Protectant) PSTE, Apply to sacral wound daily, Disp: , Rfl: 0    Syringe, Disposable, (2-3CC SYRINGE) 3 ML MISC, Use every 30 (thirty) days for 12 doses Please provide needles  And syringes for B12 injections, Disp: 12 each, Rfl: 1    tamsulosin (FLOMAX) 0 4 mg, Take 1 capsule (0 4 mg total) by mouth daily, Disp: 90 capsule, Rfl: 1    [facility]  Vitals:    02/22/22 1609   BP: 120/69   Pulse: 71   Resp: 18   Temp: (!) 97 3 °F (36 3 °C)   SpO2: 96%       Physical Exam  HENT:      Head: Normocephalic and atraumatic  Cardiovascular:      Rate and Rhythm: Normal rate and regular rhythm  Pulmonary:      Breath sounds: No wheezing or rales  Abdominal:      General: There is no distension  Palpations: Abdomen is soft  Tenderness: There is no abdominal tenderness  There is no guarding  Musculoskeletal:      Cervical back: Neck supple  Right lower leg: No edema  Left lower leg: No edema  Skin:     Coloration: Skin is not jaundiced  Neurological:      General: No focal deficit present  Mental Status: He is disoriented  Cranial Nerves: No cranial nerve deficit         Diagnostic Data:    Lab Results   Component Value Date WBC 7 06 01/09/2022    HGB 10 0 (L) 01/09/2022    HCT 31 4 (L) 01/09/2022    MCV 90 01/09/2022     01/09/2022     Lab Results   Component Value Date    SODIUM 140 01/09/2022    K 3 8 01/09/2022     (H) 01/09/2022    CO2 22 01/09/2022    BUN 17 01/09/2022    CREATININE 0 54 (L) 01/09/2022    GLUC 81 01/09/2022    CALCIUM 9 1 01/09/2022         This note was electronically signed by Dr Mary Ellen Ocasio

## 2022-02-22 NOTE — ASSESSMENT & PLAN NOTE
Patient continues with behavioral issues  She is noted to have episodes of agitation and combative behavior towards the staff  Seroquel dose was recently increased to 25 mg b i d  however patient had issues with increased somnolence and lethargy  Dose was reduced to 25 mg q h s  and 12 5 mg q a m  Wong Po Patient additionally remains on escitalopram 10 mg daily, buspirone 5 mg b i d  and donepezil 10 mg daily    Continue same

## 2022-03-16 ENCOUNTER — NURSING HOME VISIT (OUTPATIENT)
Dept: GERIATRICS | Facility: OTHER | Age: 77
End: 2022-03-16
Payer: MEDICARE

## 2022-03-16 VITALS
SYSTOLIC BLOOD PRESSURE: 125 MMHG | OXYGEN SATURATION: 95 % | RESPIRATION RATE: 18 BRPM | HEART RATE: 74 BPM | WEIGHT: 155.4 LBS | TEMPERATURE: 98.2 F | DIASTOLIC BLOOD PRESSURE: 66 MMHG | BODY MASS INDEX: 22.95 KG/M2

## 2022-03-16 DIAGNOSIS — G30.9 ALZHEIMER'S DISEASE (HCC): Primary | ICD-10-CM

## 2022-03-16 DIAGNOSIS — F02.80 ALZHEIMER'S DISEASE (HCC): Primary | ICD-10-CM

## 2022-03-16 PROCEDURE — 99308 SBSQ NF CARE LOW MDM 20: CPT | Performed by: INTERNAL MEDICINE

## 2022-03-17 RX ORDER — LORAZEPAM 0.5 MG/1
0.5 TABLET ORAL 2 TIMES DAILY
COMMUNITY

## 2022-03-17 NOTE — PROGRESS NOTES
Kyung Ledezma  [facility]  [address] [facility] [address] [place], [place], Angel Guzman U  48     Progress Note  Code SNF 31    Patient Location     [address]    Reason for visit     [address] [facility] care was coordinated with nursing facility staff  Recent vitals, labs and updated medications [facility]  Problem List Items Addressed This Visit        Nervous and Auditory    Dementia with behavioral disturbances - Primary     Patient continues to have behavioral issues with periods of agitation and irritability  He is combative towards the staff at times  Patient was recently evaluated by psych service  Medications were adjusted  Seroquel dose was increased to 50 mg b i d   Additionally remains on lorazepam donepezil Buspirone and escitalopram   Monitor response on above  Continue redirection techniques  Maintain adequate hydration  Relevant Medications    LORazepam (ATIVAN) 0 5 mg tablet        Close fracture of neck of left femur:  History of hip fracture status post  cannulated screw fixation on 01/04/2010     Anxiety:  Uncontrolled at times   Patient was recently s started on Lorazepam by psy service  Additionally remains on buspirone and escitalopram    BPH:  Continue tamsulosin      HPI       Patient is being seen for a follow-up visit today  Care coordinated with nursing staff  Patient remains confused  Unable to provide any history  He has had episodes of increased confusion with agitation irritability and combative behavior towards the staff  Is being followed by psych service  Meds were recently adjusted  No   Fever chills dyspnea or chest congestion      Review of Systems   Unable to perform ROS: Dementia (Patient is unable to provide any history due to dementia)   Constitutional: Negative for chills and fever  Respiratory: Negative for shortness of breath and wheezing      Cardiovascular: Negative for leg swelling  Gastrointestinal: Negative for abdominal distention  Musculoskeletal: Positive for gait problem  Neurological: Positive for weakness  Negative for seizures  Psychiatric/Behavioral: Positive for agitation, behavioral problems and confusion  Past Medical History:   Diagnosis Date    [facility] disease ([facility] [address] )     Anemia     BPH (benign prostatic hyperplasia)     Dementia ([facility] [address] )     Hyperlipidemia     Hypertension     Memory loss        Past Surgical History:   Procedure Laterality Date    COLONOSCOPY  11/19/2019    5 years    ORIF HIP FRACTURE Left 1/4/2022    Procedure: OPEN REDUCTION W/ INTERNAL FIXATION (ORIF) HIP WITH CANNUALATED SCREWS;  Surgeon: Caty Quiroz MD;  [facility];  Service: Orthopedics    [place] OPEN RX FEMUR FX+INTRAMED JOSE Right 11/5/2021    Procedure: INSERTION LONG NAIL IM FEMUR ANTEGRADE (TROCHANTERIC);   Surgeon: Zan Pina DO;  [facility]: [facility];  Service: Orthopedics    REPLACEMENT TOTAL KNEE Right 06/19/2019       Social History     Tobacco Use   Smoking Status Former Smoker    Packs/day: 0 25    Years: 10 00    Pack years: 2 50   Smokeless Tobacco Never Used   Tobacco Comment    1PPW       Family History   Problem Relation Age of Onset    Lung disease Father         coal workers' pneumoconiosis        No Known Allergies      Current Outpatient Medications:     LORazepam (ATIVAN) 0 5 mg tablet, Take 0 5 mg by mouth 2 (two) times a day, Disp: , Rfl:     acetaminophen (TYLENOL) 325 mg tablet, Take 975 mg by mouth 2 (two) times a day, Disp: , Rfl:     busPIRone (BUSPAR) 5 mg tablet, Take 1 tablet (5 mg total) by mouth 2 (two) times a day, Disp: 180 tablet, Rfl: 0    cyanocobalamin 1,000 mcg/mL, Inject 1 mL (1,000 mcg total) into a muscle every 30 (thirty) days for 12 doses, Disp: 12 mL, Rfl: 1    donepezil (ARICEPT) 10 mg tablet, Take 1 tablet (10 mg total) by mouth daily, Disp: 90 tablet, Rfl: 1    escitalopram (LEXAPRO) 10 mg tablet, Take 1 tablet (10 mg total) by mouth daily, Disp: 90 tablet, Rfl: 0    fluticasone (FLONASE) 50 mcg/act nasal spray, SPRAY 1 SPRAY INTO EACH NOSTRIL EVERY DAY, Disp: 16 mL, Rfl: 2    QUEtiapine (SEROquel) 25 mg tablet, Take 50 mg by mouth 2 (two) times a day 1 , Disp: , Rfl:     senna-docusate sodium (SENOKOT S) 8 6-50 mg per tablet, Take 1 tablet by mouth daily at bedtime, Disp: 10 tablet, Rfl: 0    Skin Protectants, Misc  (Calazime Skin Protectant) PSTE, Apply to sacral wound daily, Disp: , Rfl: 0    Syringe, Disposable, (2-3CC SYRINGE) 3 ML MISC, Use every 30 (thirty) days for 12 doses Please provide needles  And syringes for B12 injections, Disp: 12 each, Rfl: 1    tamsulosin (FLOMAX) 0 4 mg, Take 1 capsule (0 4 mg total) by mouth daily, Disp: 90 capsule, Rfl: 1    Updated list was reviewed in [facility] system of facility  Vitals:    03/12/22 2057   BP: 125/66   Pulse: 74   Resp: 18   Temp: 98 2 °F (36 8 °C)   SpO2: 95%       Physical Exam  Constitutional:       Comments: Patient appears to be weak and frail   HENT:      Head: Atraumatic  Cardiovascular:      Rate and Rhythm: Normal rate and regular rhythm  Pulmonary:      Breath sounds: No wheezing, rhonchi or rales  Abdominal:      Palpations: Abdomen is soft  Tenderness: There is no abdominal tenderness  There is no guarding  Musculoskeletal:      Cervical back: Neck supple  Right lower leg: No edema  Left lower leg: No edema  Skin:     Coloration: Skin is not jaundiced  Neurological:      Cranial Nerves: No cranial nerve deficit  Diagnostic Data:    BMP from 01/25/2022 revealed BUN of 16, creatinine 0 49, sodium 143, potassium 3 8  Further labs are not being done as patient becomes combative with care  Additional Notes:     Continue current treatment  Will discuss goals of care with patient's wife    If clinical condition continues to decline hospice care would be a reasonable option        This note was electronically signed by Dr Bruce Bey

## 2022-03-18 NOTE — ASSESSMENT & PLAN NOTE
Patient continues to have behavioral issues with periods of agitation and irritability  He is combative towards the staff at times  Patient was recently evaluated by psych service  Medications were adjusted  Seroquel dose was increased to 50 mg b i d   Additionally remains on lorazepam donepezil Buspirone and escitalopram   Monitor response on above  Continue redirection techniques  Maintain adequate hydration

## 2022-04-21 ENCOUNTER — NURSING HOME VISIT (OUTPATIENT)
Dept: GERIATRICS | Facility: OTHER | Age: 77
End: 2022-04-21
Payer: MEDICARE

## 2022-04-21 DIAGNOSIS — E44.0 MODERATE PROTEIN-CALORIE MALNUTRITION (HCC): ICD-10-CM

## 2022-04-21 DIAGNOSIS — F02.80 ALZHEIMER'S DISEASE (HCC): Primary | ICD-10-CM

## 2022-04-21 DIAGNOSIS — R54 FRAILTY SYNDROME IN GERIATRIC PATIENT: Chronic | ICD-10-CM

## 2022-04-21 DIAGNOSIS — N40.0 BENIGN PROSTATIC HYPERPLASIA WITHOUT LOWER URINARY TRACT SYMPTOMS: ICD-10-CM

## 2022-04-21 DIAGNOSIS — F41.9 ANXIETY AND DEPRESSION: ICD-10-CM

## 2022-04-21 DIAGNOSIS — F32.A ANXIETY AND DEPRESSION: ICD-10-CM

## 2022-04-21 DIAGNOSIS — G30.9 ALZHEIMER'S DISEASE (HCC): Primary | ICD-10-CM

## 2022-04-21 PROBLEM — G93.40 ENCEPHALOPATHY: Status: RESOLVED | Noted: 2021-11-27 | Resolved: 2022-04-21

## 2022-04-21 PROCEDURE — 99309 SBSQ NF CARE MODERATE MDM 30: CPT | Performed by: NURSE PRACTITIONER

## 2022-04-21 RX ORDER — QUETIAPINE FUMARATE 25 MG/1
50 TABLET, FILM COATED ORAL 3 TIMES DAILY
Start: 2022-04-21

## 2022-04-21 NOTE — PROGRESS NOTES
Facility: [place]  POS: 28 (LTC)  Progress Note    Chief Complaint/Reason for visit: LTC follow up   Code status: DNR  History obtained from patient's wife, nursing staff, and EMR  History of Present Illness:  57-year-old male seen and examined for LTC follow up of chronic medical conditions  He was seated in the wheelchair with his wife present in the room  Patient's wife provided his medical history  Patient is alert, confused and disoriented nursing reports that he displayed some aggressive behavior hitting staff in the face  He has been evaluated by psychiatrist and placed on Seroquel 50 milligrams t i d  will check routine labs CBC CMP, vitamin-D, TSH and lipid profile on 04/25/2022  Patient's wife stated that he has been eating good  He is losing muscle mass from decreased mobility  His creatinine is 0 49  Vital signs are stable  Past Medical History: unchanged from history and physical  Past Medical History:   Diagnosis Date    Alzheimer disease ([facility] [address]     Anemia     BPH (benign prostatic hyperplasia)     Dementia (HCC)     Hyperlipidemia     Hypertension     Memory loss      Family History: unchanged from history and physical  Social History: unchanged from history and physical  Resident Since:  01/15/2022  Review of systems: Review of Systems   Unable to perform ROS: Dementia   Constitutional: Negative for chills and fever  Respiratory: Negative for cough and shortness of breath  Gastrointestinal: Negative for abdominal pain  Musculoskeletal: Positive for gait problem  Psychiatric/Behavioral: Positive for agitation, behavioral problems and confusion  Medications: All medication and routine orders were reviewed and updated  Allergies: NKDA  Consults reviewed: Other  Labs/Diagnostics (reviewed by this provider): Copy in Chart    Imaging Reviewed:  None today    Physical Exam    Weight:  155  4 pounds Temp:  96 9         BP:  117/53  Pulse:  76 Resp: 18      O2 Sat: 96 percent on room air  Constitutional: Normocephalic  Orientation:  Confused and disoriented     Physical Exam  Vitals and nursing note reviewed  Constitutional:       General: He is not in acute distress  Appearance: He is not ill-appearing, toxic-appearing or diaphoretic  Comments: Elderly male who appears older than stated age and with chronic illness  HENT:      Head: Normocephalic  Nose: No congestion or rhinorrhea  Mouth/Throat:      Mouth: Mucous membranes are moist       Pharynx: No oropharyngeal exudate  Eyes:      General: No scleral icterus  Right eye: No discharge  Left eye: No discharge  Extraocular Movements: Extraocular movements intact  Conjunctiva/sclera: Conjunctivae normal       Pupils: Pupils are equal, round, and reactive to light  Cardiovascular:      Rate and Rhythm: Normal rate and regular rhythm  Pulses: Normal pulses  Pulmonary:      Effort: Pulmonary effort is normal  No respiratory distress  Breath sounds: Normal breath sounds  No wheezing, rhonchi or rales  Abdominal:      General: Bowel sounds are normal  There is no distension  Palpations: Abdomen is soft  Tenderness: There is no abdominal tenderness  There is no guarding  Musculoskeletal:      Cervical back: Neck supple  No rigidity  Right lower leg: No edema  Left lower leg: No edema  Comments: Moves all 4 extremities  Lymphadenopathy:      Cervical: No cervical adenopathy  Skin:     General: Skin is warm and dry  Capillary Refill: Capillary refill takes less than 2 seconds  Comments: Well-healed scar noted on right knee and left hip   Neurological:      Mental Status: He is alert  Mental status is at baseline         Assessment/Plan:  70-year-old male with:    Dementia with behavioral disturbances  With severe cognitive impairment  Patient recently evaluated by psychiatrist; Seroquel increased to 50 milligrams t i d  for psychosis  Recent aggressive behavior reported but patient did not injure himself  Continue supportive care, reorientation, and redirection as needed  Delirium precautions  Patient is currently on donepezil  Continue Ativan    Anxiety and depression  Patient is currently on BuSpar and escitalopram  Psychiatry following    Frailty syndrome in geriatric patient  Multifactorial including advanced dementia, age, and comorbidities  Continue supportive care at long-term care facility for ADLs; I ADLs  PT/OT/ST as needed  Ensure adequate hydration and nutrition    Moderate protein-calorie malnutrition ([address] )  Malnutrition as evidenced by low BMI, loss of muscle and subcutaneous tissue in setting of advanced dementia, recent fractures, debility  Provide nutritional support  Dietitian consult is needed  Monitor weights    Benign prostatic hyperplasia without lower urinary tract symptoms  Patient is currently on Flomax 0 4 milligrams daily  Followup with urology as needed    This note was completed in part utilizing m-[employer] fluency direct voice recognition software  Grammatical errors, random word insertion, spelling mistakes, and incomplete sentences may be an occasional consequence of the system secondary to software limitations, ambient noise and hardware issues  At the time of dictation, efforts were made to edit, clarify and/or correct errors  Please read the chart carefully and recognize, using context, where substitutions have occurred  If you have any questions or concerns about the context, text or information contained within the body of this dictation, please contact myself, the provider, for further clarification      Isamar Poole [address], [address] Jo[address] Buckley  0/31/92727:63 PM

## 2022-04-21 NOTE — ASSESSMENT & PLAN NOTE
With severe cognitive impairment  Patient recently evaluated by psychiatrist; Seroquel increased to 50 milligrams t i d  for psychosis  Recent aggressive behavior reported but patient did not injure himself  Continue supportive care, reorientation, and redirection as needed  Delirium precautions  Patient is currently on donepezil  Continue Ativan

## 2022-04-21 NOTE — ASSESSMENT & PLAN NOTE
Multifactorial including advanced dementia, age, and comorbidities  Continue supportive care at long-term care facility for ADLs; I ADLs  PT/OT/ST as needed  Ensure adequate hydration and nutrition

## 2022-04-21 NOTE — ASSESSMENT & PLAN NOTE
Malnutrition as evidenced by low BMI, loss of muscle and subcutaneous tissue in setting of advanced dementia, recent fractures, debility  Provide nutritional support  Dietitian consult is needed  Monitor weights

## 2022-05-19 ENCOUNTER — TELEPHONE (OUTPATIENT)
Dept: FAMILY MEDICINE CLINIC | Facility: CLINIC | Age: 77
End: 2022-05-19

## 2022-05-19 NOTE — TELEPHONE ENCOUNTER
We can not proscribe anything if pt is in the nursing home   I don't have privileges  They have to come from the in house physician

## 2022-05-19 NOTE — TELEPHONE ENCOUNTER
Wife called patient in nursing home he needs refill on  lorazapam   It has to be call to the pharmacy at [facility]   Their number is  [phone]

## 2022-05-25 ENCOUNTER — NURSING HOME VISIT (OUTPATIENT)
Dept: GERIATRICS | Facility: OTHER | Age: 77
End: 2022-05-25
Payer: MEDICARE

## 2022-05-25 VITALS
TEMPERATURE: 97.6 F | RESPIRATION RATE: 18 BRPM | HEART RATE: 58 BPM | WEIGHT: 155.4 LBS | SYSTOLIC BLOOD PRESSURE: 141 MMHG | OXYGEN SATURATION: 96 % | DIASTOLIC BLOOD PRESSURE: 62 MMHG | BODY MASS INDEX: 22.95 KG/M2

## 2022-05-25 DIAGNOSIS — G30.9 ALZHEIMER'S DISEASE (HCC): Primary | ICD-10-CM

## 2022-05-25 DIAGNOSIS — F02.80 ALZHEIMER'S DISEASE (HCC): Primary | ICD-10-CM

## 2022-05-25 PROCEDURE — 99309 SBSQ NF CARE MODERATE MDM 30: CPT | Performed by: INTERNAL MEDICINE

## 2022-05-25 NOTE — ASSESSMENT & PLAN NOTE
Patient is noted to have progressive dementia  Remains on donepezil 10 mg daily  He is dependent for all ADLs  Additionally has had episodes of irritability and agitation at times  Continue redirection techniques    Patient additionally remains on escitalopram 10 mg daily, lorazepam 0 5 mg b i d and q 8 hours p r n, Seroquel 50 mg daily and buspirone 5 mg b i d   Continue supportive treatment

## 2022-05-25 NOTE — PROGRESS NOTES
[address]  [address]   [address] [facility] [address] [place], [place], [address] Guzman [address]  48     Progress Note  Code [facility]    Patient Location     [address]    Reason for visit     Follow-up dementia, anemia, depression, anxiety, hyperlipidemia, history of recurrent falls    Patients care was coordinated with nursing facility staff  Recent vitals, labs and updated medications were reviewed on [facility]  Problem List Items Addressed This Visit        Nervous and Auditory    Dementia with behavioral disturbances - Primary     Patient is noted to have progressive dementia  Remains on donepezil 10 mg daily  He is dependent for all ADLs  Additionally has had episodes of irritability and agitation at times  Continue redirection techniques  Patient additionally remains on escitalopram 10 mg daily, lorazepam 0 5 mg b i d and q 8 hours p r n, Seroquel 50 mg daily and buspirone 5 mg b i d   Continue supportive treatment               Close fracture of neck of left femur:  History of left hip fracture status post cannulated screw fixation on 01/04/2010  Appears to have healed well  Continue p r n  Tylenol    Anxiety:  Patient tends to get episodes of anxiety and agitation at times  Continue escitalopram, buspirone and lorazepam    BPH:  Continue tamsulosin    Vitamin-D deficiency:  Recent vitamin-D level was low at 16  Continue vitamin-D 10869 units once a week      HPI       Patient is being seen for a follow-up visit today  He is unable to provide any history due to dementia  No acute issues have been reported by nursing staff  No fever chills dyspnea vomiting or diarrhea  Vitals are stable  Review of Systems   Unable to perform ROS: Dementia   HENT: Negative for facial swelling  Respiratory: Negative for shortness of breath, wheezing and stridor  Cardiovascular: Negative for leg swelling  Genitourinary: Negative for hematuria  Neurological: Negative for seizures  Psychiatric/Behavioral: Positive for behavioral problems (With care at times) and confusion  Past Medical History:   Diagnosis Date    Alzheimer disease ([facility] [address] )     Anemia     BPH (benign prostatic hyperplasia)     Dementia ([facility] [address] )     Hyperlipidemia     Hypertension     Memory loss        Past Surgical History:   Procedure Laterality Date    COLONOSCOPY  11/19/2019    5 years    ORIF HIP FRACTURE Left 1/4/2022    Procedure: OPEN REDUCTION W/ INTERNAL FIXATION (ORIF) HIP WITH CANNUALATED SCREWS;  Surgeon: Shannon Davis MD;  [facility]: [facility];  Service: Orthopedics    DC OPEN RX FEMUR FX+INTRAMED JOSE Right 11/5/2021    Procedure: INSERTION LONG NAIL IM FEMUR ANTEGRADE (TROCHANTERIC);   Surgeon: Sindy Ballard DO;  Location: [facility];  Service: Orthopedics    REPLACEMENT TOTAL KNEE Right 06/19/2019       Social History     Tobacco Use   Smoking Status Former Smoker    Packs/day: 0 25    Years: 10 00    Pack years: 2 50   Smokeless Tobacco Never Used   Tobacco Comment    1PPW       Family History   Problem Relation Age of Onset    Lung disease Father         coal workers' pneumoconiosis        No Known Allergies      Current Outpatient Medications:     acetaminophen (TYLENOL) 325 mg tablet, Take 975 mg by mouth 2 (two) times a day, Disp: , Rfl:     busPIRone (BUSPAR) 5 mg tablet, Take 1 tablet (5 mg total) by mouth 2 (two) times a day, Disp: 180 tablet, Rfl: 0    cyanocobalamin 1,000 mcg/mL, Inject 1 mL (1,000 mcg total) into a muscle every 30 (thirty) days for 12 doses, Disp: 12 mL, Rfl: 1    donepezil (ARICEPT) 10 mg tablet, Take 1 tablet (10 mg total) by mouth daily, Disp: 90 tablet, Rfl: 1    escitalopram (LEXAPRO) 10 mg tablet, Take 1 tablet (10 mg total) by mouth daily, Disp: 90 tablet, Rfl: 0    fluticasone (FLONASE) 50 mcg/act nasal spray, SPRAY 1 SPRAY INTO EACH NOSTRIL EVERY DAY, Disp: 16 mL, Rfl: 2    LORazepam (ATIVAN) 0 5 mg tablet, Take 0 5 mg by mouth 2 (two) times a day, Disp: , Rfl:     QUEtiapine (SEROquel) 25 mg tablet, Take 2 tablets (50 mg total) by mouth 3 (three) times a day Medication managed by psychiatrist, Disp: , Rfl:     senna-docusate sodium (SENOKOT S) 8 6-50 mg per tablet, Take 1 tablet by mouth daily at bedtime, Disp: 10 tablet, Rfl: 0    Skin Protectants, Misc  (Calazime Skin Protectant) PSTE, Apply to sacral wound daily, Disp: , Rfl: 0    Syringe, Disposable, (2-3CC SYRINGE) 3 ML MISC, Use every 30 (thirty) days for 12 doses Please provide needles  And syringes for B12 injections, Disp: 12 each, Rfl: 1    tamsulosin (FLOMAX) 0 4 mg, Take 1 capsule (0 4 mg total) by mouth daily, Disp: 90 capsule, Rfl: 1    Updated list was reviewed in Levine, Susan. \Hospital Has a New Name and Outlook.\"" system of facility  Vitals:    05/24/22 0843   BP: 141/62   Pulse: 58   Resp: 18   Temp: 97 6 °F (36 4 °C)   SpO2: 96%       Physical Exam  HENT:      Head: Normocephalic and atraumatic  Eyes:      General:         Right eye: No discharge  Left eye: No discharge  Cardiovascular:      Rate and Rhythm: Normal rate and regular rhythm  Pulmonary:      Breath sounds: No wheezing or rales  Abdominal:      General: There is no distension  Palpations: Abdomen is soft  Tenderness: There is no abdominal tenderness  There is no guarding  Musculoskeletal:      Cervical back: Neck supple  Right lower leg: No edema  Left lower leg: No edema  Skin:     Coloration: Skin is not jaundiced  Neurological:      General: No focal deficit present  Mental Status: He is disoriented  Cranial Nerves: No cranial nerve deficit  Diagnostic Data:    Recent labs were reviewed    Labs done on 04/26/2022 revealed hemoglobin of 10 1, WBC count 5 8, platelet count 872   BUN 21, creatinine 0 60, sodium 140, potassium 4 1, calcium 8 9, AST 40, ALT 57  Total cholesterol 138, triglycerides 80, HDL 54, LDL 68, TSH 1 90, vitamin-D 16    Care coordinated with patient's wife at bedside    This note was electronically signed by Dr Hansel Boyce

## 2022-06-08 ENCOUNTER — TELEPHONE (OUTPATIENT)
Dept: FAMILY MEDICINE CLINIC | Facility: CLINIC | Age: 77
End: 2022-06-08

## 2022-06-29 NOTE — TELEPHONE ENCOUNTER
06/28/22 11:38 PM     Thank you for your request  Your request has been received, reviewed, and the patient chart updated  The PCP has successfully been removed with a patient attribution note  This message will now be completed      Thank you  Levi Blankenship

## 2022-12-17 ENCOUNTER — HOME CARE VISIT (OUTPATIENT)
Dept: HOME HOSPICE | Facility: HOSPICE | Age: 77
End: 2022-12-17

## 2022-12-22 ENCOUNTER — HOME CARE VISIT (OUTPATIENT)
Dept: HOME HOSPICE | Facility: HOSPICE | Age: 77
End: 2022-12-22

## 2022-12-22 NOTE — CASE COMMUNICATION
Radha Woods, Bereavement Final IDG [id number] (1LR) Due: 23  : 1945  SOC: 22  DOD: 22, <24 hrs  Diagnosis: Dementia  Primary: Wife - Yuri Joseph was a 66year old man at [facility]  Spouse Antonette Lipoma  Two children  Antonette Lipoma refused to provide contact information for the children  Son is estranged  Antonette Roseoma shared wishes that he would pass quickly  She used humor  TOD: Antonette Roseoma was at bedside when  Cyrilla Shoe passed  She declined support services  CC: Fernando left a message offering condolences and information on bereavement support for Antonette Lamar  Call Assignments:  Christian Rodriguez to reassess wife Rico Tipton at [facility]  Please request Lexi's address for bereavement follow-up, as able   (Due: 22)

## 2024-04-24 NOTE — PLAN OF CARE
Health Maintenance       COVID-19 Vaccine (3 - 2023-24 season)  Overdue since 9/1/2023    Meningococcal Vaccine (2 - 2-dose series)  Overdue since 11/5/2023    Depression Screening (Yearly)  Due soon on 8/30/2024           Following review of the above:  Pended orders    Note: Refer to final orders and clinician documentation.          Depression screen done   Problem: PAIN - ADULT  Goal: Verbalizes/displays adequate comfort level or baseline comfort level  Description: Interventions:  - Encourage patient to monitor pain and request assistance  - Assess pain using appropriate pain scale  - Administer analgesics based on type and severity of pain and evaluate response  - Implement non-pharmacological measures as appropriate and evaluate response  - Consider cultural and social influences on pain and pain management  - Notify physician/advanced practitioner if interventions unsuccessful or patient reports new pain  Outcome: Progressing     Problem: INFECTION - ADULT  Goal: Absence or prevention of progression during hospitalization  Description: INTERVENTIONS:  - Assess and monitor for signs and symptoms of infection  - Monitor lab/diagnostic results  - Monitor all insertion sites, i e  indwelling lines, tubes, and drains  - Monitor endotracheal if appropriate and nasal secretions for changes in amount and color  - [place] appropriate cooling/warming therapies per order  - Administer medications as ordered  - Instruct and encourage patient and family to use good hand hygiene technique  - Identify and instruct in appropriate isolation precautions for identified infection/condition  Outcome: Progressing  Goal: Absence of fever/infection during neutropenic period  Description: INTERVENTIONS:  - Monitor WBC    Outcome: Progressing

## 2024-05-07 NOTE — ASSESSMENT & PLAN NOTE
· Secondary to mechanical fall   · Patient has no complication from previous surgery  Currently no active cardiac disease   Patient at acceptable risk for surgery  · Appreciate ortho evaluation, tentative plan for surgery today   · Analgesics as needed   · PT/OT postoperatively EMS Ambulance

## (undated) DEVICE — GLOVE SRG BIOGEL 9

## (undated) DEVICE — STERILE BETHLEHEM ORIF HIP PK: Brand: CARDINAL HEALTH

## (undated) DEVICE — 3M™ DURAPORE™ SURGICAL TAPE 1538-3, 3 INCH X 10 YARD (7,5CM X 9,1M), 4 ROLLS/BOX: Brand: 3M™ DURAPORE™

## (undated) DEVICE — DRESSING MEPILEX AG BORDER 4 X 4 IN

## (undated) DEVICE — CHLORAPREP HI-LITE 26ML ORANGE

## (undated) DEVICE — 2.5MM REAMING ROD WITH BALL TIP/950MM-STERILE

## (undated) DEVICE — LIGHT HANDLE COVER SLEEVE DISP BLUE STELLAR

## (undated) DEVICE — SUT ETHILON 2-0 FSLX 30 IN 1674H

## (undated) DEVICE — PACK MAJOR ORTHO W/SPLITS PBDS

## (undated) DEVICE — SUT VICRYL 2-0 CT-1 27 IN J259H

## (undated) DEVICE — PAD CAST 4 IN COTTON NON STERILE

## (undated) DEVICE — SUT VICRYL 0 CT-1 27 IN J260H

## (undated) DEVICE — GLOVE INDICATOR PI UNDERGLOVE SZ 8 BLUE

## (undated) DEVICE — 3.2MM GUIDE WIRE 400MM

## (undated) DEVICE — PENCIL ELECTROSURG E-Z CLEAN -0035H

## (undated) DEVICE — STOCKINETTE 6 IN COTTON NS 1 PLY

## (undated) DEVICE — ARTHROSCOPY FLOOR MAT

## (undated) DEVICE — GLOVE INDICATOR PI UNDERGLOVE SZ 9 BLUE

## (undated) DEVICE — GLOVE SRG BIOGEL ECLIPSE 8

## (undated) DEVICE — SPONGE PVP SCRUB WING STERILE

## (undated) DEVICE — 4.3MM DRILL BIT LENGTH 413MM

## (undated) DEVICE — 6617 IOBAN II PATIENT ISOLATION DRAPE 5/BX,4BX/CS: Brand: STERI-DRAPE™ IOBAN™ 2